# Patient Record
Sex: FEMALE | Race: BLACK OR AFRICAN AMERICAN | NOT HISPANIC OR LATINO | Employment: OTHER | URBAN - METROPOLITAN AREA
[De-identification: names, ages, dates, MRNs, and addresses within clinical notes are randomized per-mention and may not be internally consistent; named-entity substitution may affect disease eponyms.]

---

## 2020-07-14 ENCOUNTER — APPOINTMENT (OUTPATIENT)
Dept: GENERAL RADIOLOGY | Facility: HOSPITAL | Age: 55
End: 2020-07-14

## 2020-07-14 ENCOUNTER — HOSPITAL ENCOUNTER (EMERGENCY)
Facility: HOSPITAL | Age: 55
Discharge: HOME | End: 2020-07-14
Attending: EMERGENCY MEDICINE | Admitting: EMERGENCY MEDICINE

## 2020-07-14 VITALS
OXYGEN SATURATION: 93 % | HEIGHT: 65 IN | SYSTOLIC BLOOD PRESSURE: 198 MMHG | TEMPERATURE: 97.9 F | HEART RATE: 70 BPM | RESPIRATION RATE: 18 BRPM | BODY MASS INDEX: 38.32 KG/M2 | DIASTOLIC BLOOD PRESSURE: 86 MMHG | WEIGHT: 230 LBS

## 2020-07-14 DIAGNOSIS — I48.91 ATRIAL FIBRILLATION WITH RVR (HCC): Primary | ICD-10-CM

## 2020-07-14 DIAGNOSIS — R06.02 SHORTNESS OF BREATH: ICD-10-CM

## 2020-07-14 LAB
ALBUMIN SERPL-MCNC: 3.6 G/DL (ref 3.5–5.2)
ALBUMIN/GLOB SERPL: 1.1 G/DL
ALP SERPL-CCNC: 89 U/L (ref 39–117)
ALT SERPL W P-5'-P-CCNC: 12 U/L (ref 1–33)
ANION GAP SERPL CALCULATED.3IONS-SCNC: 10 MMOL/L (ref 5–15)
AST SERPL-CCNC: 20 U/L (ref 1–32)
BASOPHILS # BLD AUTO: 0.06 10*3/MM3 (ref 0–0.2)
BASOPHILS NFR BLD AUTO: 0.6 % (ref 0–1.5)
BILIRUB SERPL-MCNC: 0.2 MG/DL (ref 0–1.2)
BUN SERPL-MCNC: 41 MG/DL (ref 6–20)
BUN/CREAT SERPL: 16.1 (ref 7–25)
CALCIUM SPEC-SCNC: 9.2 MG/DL (ref 8.6–10.5)
CHLORIDE SERPL-SCNC: 103 MMOL/L (ref 98–107)
CO2 SERPL-SCNC: 24 MMOL/L (ref 22–29)
CREAT SERPL-MCNC: 2.55 MG/DL (ref 0.57–1)
DEPRECATED RDW RBC AUTO: 51.9 FL (ref 37–54)
EOSINOPHIL # BLD AUTO: 0.24 10*3/MM3 (ref 0–0.4)
EOSINOPHIL NFR BLD AUTO: 2.4 % (ref 0.3–6.2)
ERYTHROCYTE [DISTWIDTH] IN BLOOD BY AUTOMATED COUNT: 14.6 % (ref 12.3–15.4)
GFR SERPL CREATININE-BSD FRML MDRD: 24 ML/MIN/1.73
GLOBULIN UR ELPH-MCNC: 3.2 GM/DL
GLUCOSE SERPL-MCNC: 313 MG/DL (ref 65–99)
HCT VFR BLD AUTO: 37.1 % (ref 34–46.6)
HGB BLD-MCNC: 11.3 G/DL (ref 12–15.9)
HOLD SPECIMEN: NORMAL
HOLD SPECIMEN: NORMAL
IMM GRANULOCYTES # BLD AUTO: 0.04 10*3/MM3 (ref 0–0.05)
IMM GRANULOCYTES NFR BLD AUTO: 0.4 % (ref 0–0.5)
LIPASE SERPL-CCNC: 41 U/L (ref 13–60)
LYMPHOCYTES # BLD AUTO: 1.78 10*3/MM3 (ref 0.7–3.1)
LYMPHOCYTES NFR BLD AUTO: 18 % (ref 19.6–45.3)
MCH RBC QN AUTO: 29.6 PG (ref 26.6–33)
MCHC RBC AUTO-ENTMCNC: 30.5 G/DL (ref 31.5–35.7)
MCV RBC AUTO: 97.1 FL (ref 79–97)
MONOCYTES # BLD AUTO: 1 10*3/MM3 (ref 0.1–0.9)
MONOCYTES NFR BLD AUTO: 10.1 % (ref 5–12)
NEUTROPHILS NFR BLD AUTO: 6.78 10*3/MM3 (ref 1.7–7)
NEUTROPHILS NFR BLD AUTO: 68.5 % (ref 42.7–76)
NRBC BLD AUTO-RTO: 0 /100 WBC (ref 0–0.2)
NT-PROBNP SERPL-MCNC: 1919 PG/ML (ref 0–900)
PLATELET # BLD AUTO: 215 10*3/MM3 (ref 140–450)
PMV BLD AUTO: 10.6 FL (ref 6–12)
POTASSIUM SERPL-SCNC: 4.4 MMOL/L (ref 3.5–5.2)
PROT SERPL-MCNC: 6.8 G/DL (ref 6–8.5)
RBC # BLD AUTO: 3.82 10*6/MM3 (ref 3.77–5.28)
SODIUM SERPL-SCNC: 137 MMOL/L (ref 136–145)
TROPONIN T SERPL-MCNC: <0.01 NG/ML (ref 0–0.03)
TROPONIN T SERPL-MCNC: <0.01 NG/ML (ref 0–0.03)
WBC # BLD AUTO: 9.9 10*3/MM3 (ref 3.4–10.8)
WHOLE BLOOD HOLD SPECIMEN: NORMAL
WHOLE BLOOD HOLD SPECIMEN: NORMAL

## 2020-07-14 PROCEDURE — 93005 ELECTROCARDIOGRAM TRACING: CPT | Performed by: EMERGENCY MEDICINE

## 2020-07-14 PROCEDURE — 71045 X-RAY EXAM CHEST 1 VIEW: CPT

## 2020-07-14 PROCEDURE — 94640 AIRWAY INHALATION TREATMENT: CPT

## 2020-07-14 PROCEDURE — 96366 THER/PROPH/DIAG IV INF ADDON: CPT

## 2020-07-14 PROCEDURE — 83880 ASSAY OF NATRIURETIC PEPTIDE: CPT | Performed by: EMERGENCY MEDICINE

## 2020-07-14 PROCEDURE — 84484 ASSAY OF TROPONIN QUANT: CPT | Performed by: EMERGENCY MEDICINE

## 2020-07-14 PROCEDURE — 96376 TX/PRO/DX INJ SAME DRUG ADON: CPT

## 2020-07-14 PROCEDURE — 84484 ASSAY OF TROPONIN QUANT: CPT | Performed by: NURSE PRACTITIONER

## 2020-07-14 PROCEDURE — 80053 COMPREHEN METABOLIC PANEL: CPT | Performed by: EMERGENCY MEDICINE

## 2020-07-14 PROCEDURE — 83690 ASSAY OF LIPASE: CPT | Performed by: EMERGENCY MEDICINE

## 2020-07-14 PROCEDURE — 96365 THER/PROPH/DIAG IV INF INIT: CPT

## 2020-07-14 PROCEDURE — 85025 COMPLETE CBC W/AUTO DIFF WBC: CPT | Performed by: EMERGENCY MEDICINE

## 2020-07-14 PROCEDURE — 99285 EMERGENCY DEPT VISIT HI MDM: CPT

## 2020-07-14 RX ORDER — DILTIAZEM HCL IN NACL,ISO-OSM 125 MG/125
5-15 PLASTIC BAG, INJECTION (ML) INTRAVENOUS
Status: DISCONTINUED | OUTPATIENT
Start: 2020-07-14 | End: 2020-07-14 | Stop reason: HOSPADM

## 2020-07-14 RX ORDER — ALPRAZOLAM 1 MG/1
1 TABLET ORAL 3 TIMES DAILY PRN
COMMUNITY

## 2020-07-14 RX ORDER — HYDROCODONE BITARTRATE AND ACETAMINOPHEN 7.5; 325 MG/1; MG/1
1 TABLET ORAL EVERY 8 HOURS PRN
COMMUNITY

## 2020-07-14 RX ORDER — PRAVASTATIN SODIUM 40 MG
80 TABLET ORAL DAILY
COMMUNITY

## 2020-07-14 RX ORDER — FAMOTIDINE 20 MG/1
20 TABLET, FILM COATED ORAL 2 TIMES DAILY
COMMUNITY

## 2020-07-14 RX ORDER — MELATONIN
1000 DAILY
COMMUNITY

## 2020-07-14 RX ORDER — DILTIAZEM HYDROCHLORIDE 5 MG/ML
10 INJECTION INTRAVENOUS ONCE
Status: COMPLETED | OUTPATIENT
Start: 2020-07-14 | End: 2020-07-14

## 2020-07-14 RX ORDER — ASPIRIN 81 MG/1
324 TABLET, CHEWABLE ORAL ONCE
Status: DISCONTINUED | OUTPATIENT
Start: 2020-07-14 | End: 2020-07-14

## 2020-07-14 RX ORDER — IPRATROPIUM BROMIDE AND ALBUTEROL SULFATE 2.5; .5 MG/3ML; MG/3ML
3 SOLUTION RESPIRATORY (INHALATION) ONCE
Status: COMPLETED | OUTPATIENT
Start: 2020-07-14 | End: 2020-07-14

## 2020-07-14 RX ORDER — HYDROCHLOROTHIAZIDE 25 MG/1
25 TABLET ORAL DAILY
COMMUNITY
End: 2020-09-04 | Stop reason: HOSPADM

## 2020-07-14 RX ORDER — ASPIRIN 81 MG/1
81 TABLET, CHEWABLE ORAL DAILY
COMMUNITY

## 2020-07-14 RX ORDER — SODIUM CHLORIDE 0.9 % (FLUSH) 0.9 %
10 SYRINGE (ML) INJECTION AS NEEDED
Status: DISCONTINUED | OUTPATIENT
Start: 2020-07-14 | End: 2020-07-14 | Stop reason: HOSPADM

## 2020-07-14 RX ORDER — METOPROLOL TARTRATE 50 MG/1
50 TABLET, FILM COATED ORAL 2 TIMES DAILY
COMMUNITY
End: 2020-09-04 | Stop reason: HOSPADM

## 2020-07-14 RX ADMIN — IPRATROPIUM BROMIDE AND ALBUTEROL SULFATE 3 ML: 2.5; .5 SOLUTION RESPIRATORY (INHALATION) at 15:32

## 2020-07-14 RX ADMIN — Medication 5 MG/HR: at 10:59

## 2020-07-14 RX ADMIN — DILTIAZEM HYDROCHLORIDE 10 MG: 5 INJECTION INTRAVENOUS at 10:57

## 2020-07-14 NOTE — ED PROVIDER NOTES
"Subjective   Patient is a 55-year-old  female who presents to the ER today via EMS with complaints of rapid palpitations, chest pressure and shortness of breath that began approximately 2 hours prior to arrival.  The chest pressure improved prior to arrival and now is just \"annoying\".   Patient states that the shortness of breath is worse with exertion.  Has history of atrial fib and is anticoagulated on Eliquis, and takes metoprolol twice daily.  Reports mild lightheadedness and dizziness, but denies syncope.  Denies missing any doses of her medications.      History provided by:  Patient  Palpitations   Palpitations quality:  Fast  Onset quality:  Sudden  Duration:  2 hours  Timing:  Constant  Progression:  Unchanged  Chronicity:  New  Context comment:  History of atrial fib.  Anticoagulated on Eliquis, on metoprolol for rate control  Relieved by:  None tried  Worsened by:  Nothing  Ineffective treatments:  None tried  Associated symptoms: chest pressure, dizziness and shortness of breath    Associated symptoms: no back pain, no chest pain, no cough, no lower extremity edema, no malaise/fatigue, no nausea and no vomiting    Risk factors: hx of atrial fibrillation        Review of Systems   Constitutional: Negative for malaise/fatigue.   Respiratory: Positive for shortness of breath. Negative for cough and chest tightness.    Cardiovascular: Positive for palpitations. Negative for chest pain.   Gastrointestinal: Negative for nausea and vomiting.   Musculoskeletal: Negative for back pain.   Neurological: Positive for dizziness and light-headedness. Negative for syncope.   All other systems reviewed and are negative.      Past Medical History:   Diagnosis Date   • Anxiety    • CHF (congestive heart failure) (CMS/HCC)    • COPD (chronic obstructive pulmonary disease) (CMS/HCC)    • Diabetes mellitus (CMS/HCC)    • Hypertension    • Myocardial infarction (CMS/HCC)     3 stents   • Renal disorder  "       Allergies   Allergen Reactions   • Codeine Shortness Of Breath   • Sulfa Antibiotics Shortness Of Breath   • Nitrofuran Derivatives Swelling       History reviewed. No pertinent surgical history.    History reviewed. No pertinent family history.    Social History     Socioeconomic History   • Marital status:      Spouse name: Not on file   • Number of children: Not on file   • Years of education: Not on file   • Highest education level: Not on file   Tobacco Use   • Smoking status: Former Smoker     Packs/day: 1.00     Types: Cigarettes   Substance and Sexual Activity   • Alcohol use: Not Currently   • Drug use: Not Currently   • Sexual activity: Defer           Objective   Physical Exam   Constitutional: She is oriented to person, place, and time. She appears well-developed and well-nourished.   HENT:   Head: Normocephalic.   Neck: Neck supple. No JVD present.   Cardiovascular: Intact distal pulses and normal pulses. An irregularly irregular rhythm present. Tachycardia present.   Pulses:       Dorsalis pedis pulses are 2+ on the right side, and 2+ on the left side.   Pulmonary/Chest: Effort normal and breath sounds normal. She has no wheezes. She has no rhonchi. She has no rales.   Abdominal: Soft. Bowel sounds are normal.   Musculoskeletal:        Right lower leg: Normal. She exhibits no edema.        Left lower leg: Normal. She exhibits no edema.   Neurological: She is alert and oriented to person, place, and time.   Skin: Skin is warm and dry.   Psychiatric: She has a normal mood and affect. Her behavior is normal.   Vitals reviewed.      Procedures           ED Course      Recent Results (from the past 24 hour(s))   Troponin    Collection Time: 07/14/20 10:32 AM   Result Value Ref Range    Troponin T <0.010 0.000 - 0.030 ng/mL   Comprehensive Metabolic Panel    Collection Time: 07/14/20 10:32 AM   Result Value Ref Range    Glucose 313 (H) 65 - 99 mg/dL    BUN 41 (H) 6 - 20 mg/dL    Creatinine 2.55  (H) 0.57 - 1.00 mg/dL    Sodium 137 136 - 145 mmol/L    Potassium 4.4 3.5 - 5.2 mmol/L    Chloride 103 98 - 107 mmol/L    CO2 24.0 22.0 - 29.0 mmol/L    Calcium 9.2 8.6 - 10.5 mg/dL    Total Protein 6.8 6.0 - 8.5 g/dL    Albumin 3.60 3.50 - 5.20 g/dL    ALT (SGPT) 12 1 - 33 U/L    AST (SGOT) 20 1 - 32 U/L    Alkaline Phosphatase 89 39 - 117 U/L    Total Bilirubin 0.2 0.0 - 1.2 mg/dL    eGFR  African Amer 24 (L) >60 mL/min/1.73    Globulin 3.2 gm/dL    A/G Ratio 1.1 g/dL    BUN/Creatinine Ratio 16.1 7.0 - 25.0    Anion Gap 10.0 5.0 - 15.0 mmol/L   Lipase    Collection Time: 07/14/20 10:32 AM   Result Value Ref Range    Lipase 41 13 - 60 U/L   BNP    Collection Time: 07/14/20 10:32 AM   Result Value Ref Range    proBNP 1,919.0 (H) 0.0 - 900.0 pg/mL   Light Blue Top    Collection Time: 07/14/20 10:32 AM   Result Value Ref Range    Extra Tube hold for add-on    Green Top (Gel)    Collection Time: 07/14/20 10:32 AM   Result Value Ref Range    Extra Tube Hold for add-ons.    Lavender Top    Collection Time: 07/14/20 10:32 AM   Result Value Ref Range    Extra Tube hold for add-on    Gold Top - SST    Collection Time: 07/14/20 10:32 AM   Result Value Ref Range    Extra Tube Hold for add-ons.    CBC Auto Differential    Collection Time: 07/14/20 10:32 AM   Result Value Ref Range    WBC 9.90 3.40 - 10.80 10*3/mm3    RBC 3.82 3.77 - 5.28 10*6/mm3    Hemoglobin 11.3 (L) 12.0 - 15.9 g/dL    Hematocrit 37.1 34.0 - 46.6 %    MCV 97.1 (H) 79.0 - 97.0 fL    MCH 29.6 26.6 - 33.0 pg    MCHC 30.5 (L) 31.5 - 35.7 g/dL    RDW 14.6 12.3 - 15.4 %    RDW-SD 51.9 37.0 - 54.0 fl    MPV 10.6 6.0 - 12.0 fL    Platelets 215 140 - 450 10*3/mm3    Neutrophil % 68.5 42.7 - 76.0 %    Lymphocyte % 18.0 (L) 19.6 - 45.3 %    Monocyte % 10.1 5.0 - 12.0 %    Eosinophil % 2.4 0.3 - 6.2 %    Basophil % 0.6 0.0 - 1.5 %    Immature Grans % 0.4 0.0 - 0.5 %    Neutrophils, Absolute 6.78 1.70 - 7.00 10*3/mm3    Lymphocytes, Absolute 1.78 0.70 - 3.10 10*3/mm3     "Monocytes, Absolute 1.00 (H) 0.10 - 0.90 10*3/mm3    Eosinophils, Absolute 0.24 0.00 - 0.40 10*3/mm3    Basophils, Absolute 0.06 0.00 - 0.20 10*3/mm3    Immature Grans, Absolute 0.04 0.00 - 0.05 10*3/mm3    nRBC 0.0 0.0 - 0.2 /100 WBC     Note: In addition to lab results from this visit, the labs listed above may include labs taken at another facility or during a different encounter within the last 24 hours. Please correlate lab times with ED admission and discharge times for further clarification of the services performed during this visit.    XR Chest 1 View   Preliminary Result   Heart is enlarged with prominence of the pulmonary   vascularity. No evidence of acute parenchymal disease.       D:  07/14/2020   E:  07/14/2020            Vitals:    07/14/20 1110 07/14/20 1140 07/14/20 1141 07/14/20 1150   BP: 147/74 (!) 175/114  157/84   BP Location:       Patient Position:       Pulse: 83  70 67   Resp:       Temp:       TempSrc:       SpO2: 94%  95% 91%   Weight:       Height:         Medications   sodium chloride 0.9 % flush 10 mL (has no administration in time range)   dilTIAZem (CARDIZEM) 125 mg in 125 mL 0.7% sodium chloride (1 mg/mL) infusion (5 mg/hr Intravenous New Bag 7/14/20 1059)   dilTIAZem (CARDIZEM) injection 10 mg (10 mg Intravenous Given 7/14/20 1057)     ECG/EMG Results (last 24 hours)     Procedure Component Value Units Date/Time    ECG 12 Lead [[id number]] Collected:  07/14/20 1002     Updated:  07/14/20 1012        ECG 12 Lead           HEART Score: 3       After initiation of the Cardizem, patient converted to normal sinus rhythm with a rate in the 80s.  States that she is feeling much better, and breathing is back to normal.  States that her chest \"pressure\" is completely resolved and is wishing to go home.     Spoke with Dr. Atkins who recommends that if we have a second negative troponin is okay to discharge patient to home with follow-up in his office in 3 days.     Prior to discharge pt is " requesting a duoneb treatment as she is several hours late on her regular treatment.      Discussed treatment plan with patient to include follow-up with Dr. Atkins in 3 days, avoiding caffeine, or stressful or strenuous activity.  Advised patient to return to the ER with return of symptoms or new symptoms.  Patient verbalized understanding of all discussed                           MDM    Final diagnoses:   Atrial fibrillation with RVR (CMS/HCC)            Samanta Guzman, LUCI  07/15/20 0702

## 2020-08-25 ENCOUNTER — APPOINTMENT (OUTPATIENT)
Dept: CT IMAGING | Facility: HOSPITAL | Age: 55
End: 2020-08-25

## 2020-08-25 ENCOUNTER — HOSPITAL ENCOUNTER (INPATIENT)
Facility: HOSPITAL | Age: 55
LOS: 10 days | Discharge: HOME | End: 2020-09-04
Attending: EMERGENCY MEDICINE | Admitting: HOSPITALIST

## 2020-08-25 DIAGNOSIS — J96.01 ACUTE RESPIRATORY FAILURE WITH HYPOXIA (HCC): ICD-10-CM

## 2020-08-25 DIAGNOSIS — I48.91 ATRIAL FIBRILLATION WITH RVR (HCC): Primary | ICD-10-CM

## 2020-08-25 DIAGNOSIS — R73.9 HYPERGLYCEMIA: ICD-10-CM

## 2020-08-25 DIAGNOSIS — N18.9 CHRONIC KIDNEY DISEASE, UNSPECIFIED CKD STAGE: ICD-10-CM

## 2020-08-25 DIAGNOSIS — J18.9 COMMUNITY ACQUIRED PNEUMONIA, UNSPECIFIED LATERALITY: ICD-10-CM

## 2020-08-25 LAB
ALBUMIN SERPL-MCNC: 3.9 G/DL (ref 3.5–5.2)
ALBUMIN/GLOB SERPL: 1.1 G/DL
ALP SERPL-CCNC: 92 U/L (ref 39–117)
ALT SERPL W P-5'-P-CCNC: 15 U/L (ref 1–33)
ANION GAP SERPL CALCULATED.3IONS-SCNC: 11 MMOL/L (ref 5–15)
AST SERPL-CCNC: 23 U/L (ref 1–32)
BASOPHILS # BLD AUTO: 0.06 10*3/MM3 (ref 0–0.2)
BASOPHILS NFR BLD AUTO: 0.6 % (ref 0–1.5)
BILIRUB SERPL-MCNC: 0.4 MG/DL (ref 0–1.2)
BUN SERPL-MCNC: 45 MG/DL (ref 6–20)
BUN/CREAT SERPL: 15.2 (ref 7–25)
CALCIUM SPEC-SCNC: 9.8 MG/DL (ref 8.6–10.5)
CHLORIDE SERPL-SCNC: 106 MMOL/L (ref 98–107)
CO2 SERPL-SCNC: 21 MMOL/L (ref 22–29)
CREAT SERPL-MCNC: 2.96 MG/DL (ref 0.57–1)
D-LACTATE SERPL-SCNC: 1.5 MMOL/L (ref 0.5–2)
DEPRECATED RDW RBC AUTO: 55.9 FL (ref 37–54)
EOSINOPHIL # BLD AUTO: 0.24 10*3/MM3 (ref 0–0.4)
EOSINOPHIL NFR BLD AUTO: 2.3 % (ref 0.3–6.2)
ERYTHROCYTE [DISTWIDTH] IN BLOOD BY AUTOMATED COUNT: 15.7 % (ref 12.3–15.4)
GFR SERPL CREATININE-BSD FRML MDRD: 20 ML/MIN/1.73
GLOBULIN UR ELPH-MCNC: 3.7 GM/DL
GLUCOSE SERPL-MCNC: 266 MG/DL (ref 65–99)
HCT VFR BLD AUTO: 36.7 % (ref 34–46.6)
HGB BLD-MCNC: 11.1 G/DL (ref 12–15.9)
IMM GRANULOCYTES # BLD AUTO: 0.04 10*3/MM3 (ref 0–0.05)
IMM GRANULOCYTES NFR BLD AUTO: 0.4 % (ref 0–0.5)
INR PPP: 1.2 (ref 0.85–1.16)
LYMPHOCYTES # BLD AUTO: 2.26 10*3/MM3 (ref 0.7–3.1)
LYMPHOCYTES NFR BLD AUTO: 22 % (ref 19.6–45.3)
MAGNESIUM SERPL-MCNC: 2.1 MG/DL (ref 1.6–2.6)
MCH RBC QN AUTO: 29.8 PG (ref 26.6–33)
MCHC RBC AUTO-ENTMCNC: 30.2 G/DL (ref 31.5–35.7)
MCV RBC AUTO: 98.7 FL (ref 79–97)
MONOCYTES # BLD AUTO: 0.98 10*3/MM3 (ref 0.1–0.9)
MONOCYTES NFR BLD AUTO: 9.6 % (ref 5–12)
NEUTROPHILS NFR BLD AUTO: 6.67 10*3/MM3 (ref 1.7–7)
NEUTROPHILS NFR BLD AUTO: 65.1 % (ref 42.7–76)
NRBC BLD AUTO-RTO: 0 /100 WBC (ref 0–0.2)
NT-PROBNP SERPL-MCNC: 2842 PG/ML (ref 0–900)
PLATELET # BLD AUTO: 199 10*3/MM3 (ref 140–450)
PMV BLD AUTO: 11.1 FL (ref 6–12)
POTASSIUM SERPL-SCNC: 4.5 MMOL/L (ref 3.5–5.2)
PROCALCITONIN SERPL-MCNC: 0.14 NG/ML (ref 0–0.25)
PROT SERPL-MCNC: 7.6 G/DL (ref 6–8.5)
PROTHROMBIN TIME: 14.9 SECONDS (ref 11.5–14)
RBC # BLD AUTO: 3.72 10*6/MM3 (ref 3.77–5.28)
SODIUM SERPL-SCNC: 138 MMOL/L (ref 136–145)
T4 FREE SERPL-MCNC: 1.16 NG/DL (ref 0.93–1.7)
TROPONIN T SERPL-MCNC: <0.01 NG/ML (ref 0–0.03)
TSH SERPL DL<=0.05 MIU/L-ACNC: 2.34 UIU/ML (ref 0.27–4.2)
WBC # BLD AUTO: 10.25 10*3/MM3 (ref 3.4–10.8)

## 2020-08-25 PROCEDURE — 83605 ASSAY OF LACTIC ACID: CPT | Performed by: EMERGENCY MEDICINE

## 2020-08-25 PROCEDURE — 84439 ASSAY OF FREE THYROXINE: CPT | Performed by: EMERGENCY MEDICINE

## 2020-08-25 PROCEDURE — 83735 ASSAY OF MAGNESIUM: CPT | Performed by: EMERGENCY MEDICINE

## 2020-08-25 PROCEDURE — 80053 COMPREHEN METABOLIC PANEL: CPT | Performed by: EMERGENCY MEDICINE

## 2020-08-25 PROCEDURE — 94640 AIRWAY INHALATION TREATMENT: CPT

## 2020-08-25 PROCEDURE — 84484 ASSAY OF TROPONIN QUANT: CPT | Performed by: EMERGENCY MEDICINE

## 2020-08-25 PROCEDURE — 85025 COMPLETE CBC W/AUTO DIFF WBC: CPT | Performed by: EMERGENCY MEDICINE

## 2020-08-25 PROCEDURE — 93005 ELECTROCARDIOGRAM TRACING: CPT | Performed by: EMERGENCY MEDICINE

## 2020-08-25 PROCEDURE — 84443 ASSAY THYROID STIM HORMONE: CPT | Performed by: EMERGENCY MEDICINE

## 2020-08-25 PROCEDURE — 25010000002 METHYLPREDNISOLONE PER 125 MG: Performed by: EMERGENCY MEDICINE

## 2020-08-25 PROCEDURE — 87040 BLOOD CULTURE FOR BACTERIA: CPT | Performed by: EMERGENCY MEDICINE

## 2020-08-25 PROCEDURE — 25010000002 MAGNESIUM SULFATE 2 GM/50ML SOLUTION: Performed by: EMERGENCY MEDICINE

## 2020-08-25 PROCEDURE — 99284 EMERGENCY DEPT VISIT MOD MDM: CPT

## 2020-08-25 PROCEDURE — 83880 ASSAY OF NATRIURETIC PEPTIDE: CPT | Performed by: EMERGENCY MEDICINE

## 2020-08-25 PROCEDURE — 71250 CT THORAX DX C-: CPT

## 2020-08-25 PROCEDURE — 85610 PROTHROMBIN TIME: CPT | Performed by: EMERGENCY MEDICINE

## 2020-08-25 PROCEDURE — 84145 PROCALCITONIN (PCT): CPT | Performed by: EMERGENCY MEDICINE

## 2020-08-25 RX ORDER — ALBUTEROL SULFATE 90 UG/1
2 AEROSOL, METERED RESPIRATORY (INHALATION) ONCE
Status: COMPLETED | OUTPATIENT
Start: 2020-08-25 | End: 2020-08-25

## 2020-08-25 RX ORDER — MAGNESIUM SULFATE HEPTAHYDRATE 40 MG/ML
2 INJECTION, SOLUTION INTRAVENOUS ONCE
Status: COMPLETED | OUTPATIENT
Start: 2020-08-25 | End: 2020-08-26

## 2020-08-25 RX ORDER — METHYLPREDNISOLONE SODIUM SUCCINATE 125 MG/2ML
125 INJECTION, POWDER, LYOPHILIZED, FOR SOLUTION INTRAMUSCULAR; INTRAVENOUS ONCE
Status: COMPLETED | OUTPATIENT
Start: 2020-08-25 | End: 2020-08-25

## 2020-08-25 RX ORDER — DILTIAZEM HYDROCHLORIDE 5 MG/ML
10 INJECTION INTRAVENOUS ONCE
Status: COMPLETED | OUTPATIENT
Start: 2020-08-25 | End: 2020-08-26

## 2020-08-25 RX ORDER — ALBUTEROL SULFATE 90 UG/1
2 AEROSOL, METERED RESPIRATORY (INHALATION)
Status: DISCONTINUED | OUTPATIENT
Start: 2020-08-25 | End: 2020-08-26

## 2020-08-25 RX ADMIN — ALBUTEROL SULFATE 2 PUFF: 90 AEROSOL, METERED RESPIRATORY (INHALATION) at 22:10

## 2020-08-25 RX ADMIN — MAGNESIUM SULFATE 2 G: 2 INJECTION INTRAVENOUS at 23:03

## 2020-08-25 RX ADMIN — METHYLPREDNISOLONE SODIUM SUCCINATE 125 MG: 125 INJECTION, POWDER, FOR SOLUTION INTRAMUSCULAR; INTRAVENOUS at 22:34

## 2020-08-26 ENCOUNTER — APPOINTMENT (OUTPATIENT)
Dept: CARDIOLOGY | Facility: HOSPITAL | Age: 55
End: 2020-08-26

## 2020-08-26 PROBLEM — N18.4 CKD (CHRONIC KIDNEY DISEASE) STAGE 4, GFR 15-29 ML/MIN (HCC): Status: ACTIVE | Noted: 2020-08-26

## 2020-08-26 PROBLEM — I50.9 CHF (CONGESTIVE HEART FAILURE) (HCC): Status: ACTIVE | Noted: 2020-08-26

## 2020-08-26 PROBLEM — J44.9 COPD (CHRONIC OBSTRUCTIVE PULMONARY DISEASE) (HCC): Status: ACTIVE | Noted: 2020-08-26

## 2020-08-26 PROBLEM — I10 HTN (HYPERTENSION): Status: ACTIVE | Noted: 2020-08-26

## 2020-08-26 PROBLEM — J96.01 ACUTE RESPIRATORY FAILURE WITH HYPOXIA (HCC): Status: ACTIVE | Noted: 2020-08-26

## 2020-08-26 PROBLEM — I25.10 CAD (CORONARY ARTERY DISEASE): Status: ACTIVE | Noted: 2020-08-26

## 2020-08-26 PROBLEM — I70.1 RENAL ARTERY STENOSIS (HCC): Status: ACTIVE | Noted: 2020-08-26

## 2020-08-26 PROBLEM — J18.9 PNEUMONIA: Status: ACTIVE | Noted: 2020-08-26

## 2020-08-26 PROBLEM — F41.9 ANXIETY: Status: ACTIVE | Noted: 2020-08-26

## 2020-08-26 PROBLEM — D53.9 MACROCYTIC ANEMIA: Status: ACTIVE | Noted: 2020-08-26

## 2020-08-26 PROBLEM — N17.9 AKI (ACUTE KIDNEY INJURY) (HCC): Status: ACTIVE | Noted: 2020-08-26

## 2020-08-26 PROBLEM — E11.9 TYPE 2 DIABETES MELLITUS (HCC): Status: ACTIVE | Noted: 2020-08-26

## 2020-08-26 PROBLEM — Z72.0 TOBACCO ABUSE: Status: ACTIVE | Noted: 2020-08-26

## 2020-08-26 LAB
25(OH)D3 SERPL-MCNC: 37.5 NG/ML (ref 30–100)
ANION GAP SERPL CALCULATED.3IONS-SCNC: 12 MMOL/L (ref 5–15)
B PARAPERT DNA SPEC QL NAA+PROBE: NOT DETECTED
B PERT DNA SPEC QL NAA+PROBE: NOT DETECTED
BACTERIA SPEC RESP CULT: NORMAL
BACTERIA UR QL AUTO: NORMAL /HPF
BASOPHILS # BLD AUTO: 0.03 10*3/MM3 (ref 0–0.2)
BASOPHILS NFR BLD AUTO: 0.3 % (ref 0–1.5)
BH CV ECHO MEAS - AO MAX PG (FULL): 6 MMHG
BH CV ECHO MEAS - AO MAX PG: 9.9 MMHG
BH CV ECHO MEAS - AO MEAN PG (FULL): 3.7 MMHG
BH CV ECHO MEAS - AO MEAN PG: 5.5 MMHG
BH CV ECHO MEAS - AO ROOT AREA (BSA CORRECTED): 1.2
BH CV ECHO MEAS - AO ROOT AREA: 5.9 CM^2
BH CV ECHO MEAS - AO ROOT DIAM: 2.7 CM
BH CV ECHO MEAS - AO V2 MAX: 157.5 CM/SEC
BH CV ECHO MEAS - AO V2 MEAN: 111.9 CM/SEC
BH CV ECHO MEAS - AO V2 VTI: 39.4 CM
BH CV ECHO MEAS - ASC AORTA: 3 CM
BH CV ECHO MEAS - AVA(I,A): 2 CM^2
BH CV ECHO MEAS - AVA(I,D): 2 CM^2
BH CV ECHO MEAS - AVA(V,A): 2.1 CM^2
BH CV ECHO MEAS - AVA(V,D): 2.1 CM^2
BH CV ECHO MEAS - BSA(HAYCOCK): 2.4 M^2
BH CV ECHO MEAS - BSA: 2.2 M^2
BH CV ECHO MEAS - BZI_BMI: 43.8 KILOGRAMS/M^2
BH CV ECHO MEAS - BZI_METRIC_HEIGHT: 165.1 CM
BH CV ECHO MEAS - BZI_METRIC_WEIGHT: 119.3 KG
BH CV ECHO MEAS - EDV(CUBED): 156.1 ML
BH CV ECHO MEAS - EDV(MOD-SP2): 183 ML
BH CV ECHO MEAS - EDV(MOD-SP4): 202 ML
BH CV ECHO MEAS - EDV(TEICH): 140.4 ML
BH CV ECHO MEAS - EF(CUBED): 82.8 %
BH CV ECHO MEAS - EF(MOD-BP): 56 %
BH CV ECHO MEAS - EF(MOD-SP2): 55.7 %
BH CV ECHO MEAS - EF(MOD-SP4): 59.9 %
BH CV ECHO MEAS - EF(TEICH): 75.2 %
BH CV ECHO MEAS - ESV(CUBED): 26.9 ML
BH CV ECHO MEAS - ESV(MOD-SP2): 81 ML
BH CV ECHO MEAS - ESV(MOD-SP4): 81 ML
BH CV ECHO MEAS - ESV(TEICH): 34.8 ML
BH CV ECHO MEAS - FS: 44.4 %
BH CV ECHO MEAS - IVS/LVPW: 0.97
BH CV ECHO MEAS - IVSD: 1.3 CM
BH CV ECHO MEAS - LA DIMENSION: 5.4 CM
BH CV ECHO MEAS - LA/AO: 2
BH CV ECHO MEAS - LAD MAJOR: 6 CM
BH CV ECHO MEAS - LAT PEAK E' VEL: 7.2 CM/SEC
BH CV ECHO MEAS - LATERAL E/E' RATIO: 22
BH CV ECHO MEAS - LV DIASTOLIC VOL/BSA (35-75): 90.9 ML/M^2
BH CV ECHO MEAS - LV IVRT: 0.06 SEC
BH CV ECHO MEAS - LV MASS(C)D: 304.6 GRAMS
BH CV ECHO MEAS - LV MASS(C)DI: 137.1 GRAMS/M^2
BH CV ECHO MEAS - LV MAX PG: 3.9 MMHG
BH CV ECHO MEAS - LV MEAN PG: 1.7 MMHG
BH CV ECHO MEAS - LV SYSTOLIC VOL/BSA (12-30): 36.4 ML/M^2
BH CV ECHO MEAS - LV V1 MAX: 98.7 CM/SEC
BH CV ECHO MEAS - LV V1 MEAN: 60.3 CM/SEC
BH CV ECHO MEAS - LV V1 VTI: 24.6 CM
BH CV ECHO MEAS - LVIDD: 5.4 CM
BH CV ECHO MEAS - LVIDS: 3 CM
BH CV ECHO MEAS - LVLD AP2: 8.8 CM
BH CV ECHO MEAS - LVLD AP4: 8.8 CM
BH CV ECHO MEAS - LVLS AP2: 7.9 CM
BH CV ECHO MEAS - LVLS AP4: 7.4 CM
BH CV ECHO MEAS - LVOT AREA (M): 3.1 CM^2
BH CV ECHO MEAS - LVOT AREA: 3.3 CM^2
BH CV ECHO MEAS - LVOT DIAM: 2 CM
BH CV ECHO MEAS - LVPWD: 1.4 CM
BH CV ECHO MEAS - MED PEAK E' VEL: 11 CM/SEC
BH CV ECHO MEAS - MEDIAL E/E' RATIO: 14.4
BH CV ECHO MEAS - MV A MAX VEL: 94.6 CM/SEC
BH CV ECHO MEAS - MV DEC SLOPE: 927.4 CM/SEC^2
BH CV ECHO MEAS - MV E MAX VEL: 160.4 CM/SEC
BH CV ECHO MEAS - MV E/A: 1.7
BH CV ECHO MEAS - MV MAX PG: 13.2 MMHG
BH CV ECHO MEAS - MV MEAN PG: 4.8 MMHG
BH CV ECHO MEAS - MV P1/2T MAX VEL: 181.3 CM/SEC
BH CV ECHO MEAS - MV P1/2T: 57.3 MSEC
BH CV ECHO MEAS - MV V2 MAX: 181.3 CM/SEC
BH CV ECHO MEAS - MV V2 MEAN: 97.3 CM/SEC
BH CV ECHO MEAS - MV V2 VTI: 42.8 CM
BH CV ECHO MEAS - MVA P1/2T LCG: 1.2 CM^2
BH CV ECHO MEAS - MVA(P1/2T): 3.8 CM^2
BH CV ECHO MEAS - MVA(VTI): 1.9 CM^2
BH CV ECHO MEAS - PA ACC SLOPE: 1194 CM/SEC^2
BH CV ECHO MEAS - PA ACC TIME: 0.11 SEC
BH CV ECHO MEAS - PA MAX PG: 8.1 MMHG
BH CV ECHO MEAS - PA PR(ACCEL): 31.5 MMHG
BH CV ECHO MEAS - PA V2 MAX: 142.5 CM/SEC
BH CV ECHO MEAS - PI END-D VEL: 135.7 CM/SEC
BH CV ECHO MEAS - RAP SYSTOLE: 8 MMHG
BH CV ECHO MEAS - RVSP: 31 MMHG
BH CV ECHO MEAS - SI(AO): 104.6 ML/M^2
BH CV ECHO MEAS - SI(CUBED): 58.2 ML/M^2
BH CV ECHO MEAS - SI(LVOT): 36.3 ML/M^2
BH CV ECHO MEAS - SI(MOD-SP2): 45.9 ML/M^2
BH CV ECHO MEAS - SI(MOD-SP4): 54.4 ML/M^2
BH CV ECHO MEAS - SI(TEICH): 47.5 ML/M^2
BH CV ECHO MEAS - SV(AO): 232.5 ML
BH CV ECHO MEAS - SV(CUBED): 129.3 ML
BH CV ECHO MEAS - SV(LVOT): 80.7 ML
BH CV ECHO MEAS - SV(MOD-SP2): 102 ML
BH CV ECHO MEAS - SV(MOD-SP4): 121 ML
BH CV ECHO MEAS - SV(TEICH): 105.6 ML
BH CV ECHO MEAS - TAPSE (>1.6): 2.1 CM2
BH CV ECHO MEAS - TR MAX PG: 23 MMHG
BH CV ECHO MEAS - TR MAX VEL: 238 CM/SEC
BH CV ECHO MEASUREMENTS AVERAGE E/E' RATIO: 17.63
BH CV VAS BP LEFT ARM: NORMAL MMHG
BH CV XLRA - RV BASE: 5 CM
BH CV XLRA - RV LENGTH: 6.5 CM
BH CV XLRA - RV MID: 3.5 CM
BH CV XLRA - TDI S': 12.6 CM/SEC
BILIRUB UR QL STRIP: NEGATIVE
BUN SERPL-MCNC: 47 MG/DL (ref 6–20)
BUN/CREAT SERPL: 15.5 (ref 7–25)
C PNEUM DNA NPH QL NAA+NON-PROBE: NOT DETECTED
CALCIUM SPEC-SCNC: 9.2 MG/DL (ref 8.6–10.5)
CHLORIDE SERPL-SCNC: 105 MMOL/L (ref 98–107)
CHOLEST SERPL-MCNC: 177 MG/DL (ref 0–200)
CLARITY UR: ABNORMAL
CO2 SERPL-SCNC: 21 MMOL/L (ref 22–29)
COLOR UR: YELLOW
CREAT SERPL-MCNC: 3.04 MG/DL (ref 0.57–1)
DEPRECATED RDW RBC AUTO: 56.1 FL (ref 37–54)
EOSINOPHIL # BLD AUTO: 0.02 10*3/MM3 (ref 0–0.4)
EOSINOPHIL NFR BLD AUTO: 0.2 % (ref 0.3–6.2)
ERYTHROCYTE [DISTWIDTH] IN BLOOD BY AUTOMATED COUNT: 15.5 % (ref 12.3–15.4)
FERRITIN SERPL-MCNC: 143.4 NG/ML (ref 13–150)
FLUAV H1 2009 PAND RNA NPH QL NAA+PROBE: NOT DETECTED
FLUAV H1 HA GENE NPH QL NAA+PROBE: NOT DETECTED
FLUAV H3 RNA NPH QL NAA+PROBE: NOT DETECTED
FLUAV SUBTYP SPEC NAA+PROBE: NOT DETECTED
FLUBV RNA ISLT QL NAA+PROBE: NOT DETECTED
FOLATE SERPL-MCNC: 4.96 NG/ML (ref 4.78–24.2)
GFR SERPL CREATININE-BSD FRML MDRD: 19 ML/MIN/1.73
GLUCOSE BLDC GLUCOMTR-MCNC: 348 MG/DL (ref 70–130)
GLUCOSE BLDC GLUCOMTR-MCNC: 351 MG/DL (ref 70–130)
GLUCOSE SERPL-MCNC: 349 MG/DL (ref 65–99)
GLUCOSE UR STRIP-MCNC: ABNORMAL MG/DL
GRAM STN SPEC: NORMAL
HADV DNA SPEC NAA+PROBE: NOT DETECTED
HBA1C MFR BLD: 9.2 % (ref 4.8–5.6)
HCOV 229E RNA SPEC QL NAA+PROBE: NOT DETECTED
HCOV HKU1 RNA SPEC QL NAA+PROBE: NOT DETECTED
HCOV NL63 RNA SPEC QL NAA+PROBE: NOT DETECTED
HCOV OC43 RNA SPEC QL NAA+PROBE: NOT DETECTED
HCT VFR BLD AUTO: 35.8 % (ref 34–46.6)
HDLC SERPL-MCNC: 42 MG/DL (ref 40–60)
HGB BLD-MCNC: 10.9 G/DL (ref 12–15.9)
HGB UR QL STRIP.AUTO: NEGATIVE
HMPV RNA NPH QL NAA+NON-PROBE: NOT DETECTED
HPIV1 RNA SPEC QL NAA+PROBE: NOT DETECTED
HPIV2 RNA SPEC QL NAA+PROBE: NOT DETECTED
HPIV3 RNA NPH QL NAA+PROBE: NOT DETECTED
HPIV4 P GENE NPH QL NAA+PROBE: NOT DETECTED
HYALINE CASTS UR QL AUTO: NORMAL /LPF
IMM GRANULOCYTES # BLD AUTO: 0.04 10*3/MM3 (ref 0–0.05)
IMM GRANULOCYTES NFR BLD AUTO: 0.4 % (ref 0–0.5)
IRON 24H UR-MRATE: 37 MCG/DL (ref 37–145)
IRON SATN MFR SERPL: 13 % (ref 20–50)
KETONES UR QL STRIP: NEGATIVE
L PNEUMO1 AG UR QL IA: NEGATIVE
LDLC SERPL CALC-MCNC: 110 MG/DL (ref 0–100)
LDLC/HDLC SERPL: 2.62 {RATIO}
LEFT ATRIUM VOLUME INDEX: 35.5 ML/M^2
LEFT ATRIUM VOLUME: 79 ML
LEUKOCYTE ESTERASE UR QL STRIP.AUTO: NEGATIVE
LYMPHOCYTES # BLD AUTO: 0.77 10*3/MM3 (ref 0.7–3.1)
LYMPHOCYTES NFR BLD AUTO: 8.6 % (ref 19.6–45.3)
M PNEUMO IGG SER IA-ACNC: NOT DETECTED
MAGNESIUM SERPL-MCNC: 2.4 MG/DL (ref 1.6–2.6)
MCH RBC QN AUTO: 30.1 PG (ref 26.6–33)
MCHC RBC AUTO-ENTMCNC: 30.4 G/DL (ref 31.5–35.7)
MCV RBC AUTO: 98.9 FL (ref 79–97)
MONOCYTES # BLD AUTO: 0.15 10*3/MM3 (ref 0.1–0.9)
MONOCYTES NFR BLD AUTO: 1.7 % (ref 5–12)
NEUTROPHILS NFR BLD AUTO: 7.92 10*3/MM3 (ref 1.7–7)
NEUTROPHILS NFR BLD AUTO: 88.8 % (ref 42.7–76)
NITRITE UR QL STRIP: NEGATIVE
NRBC BLD AUTO-RTO: 0 /100 WBC (ref 0–0.2)
PH UR STRIP.AUTO: <=5 [PH] (ref 5–8)
PLATELET # BLD AUTO: 177 10*3/MM3 (ref 140–450)
PMV BLD AUTO: 11.6 FL (ref 6–12)
POTASSIUM SERPL-SCNC: 5.3 MMOL/L (ref 3.5–5.2)
PROT UR QL STRIP: ABNORMAL
PTH-INTACT SERPL-MCNC: 33.5 PG/ML (ref 15–65)
RBC # BLD AUTO: 3.62 10*6/MM3 (ref 3.77–5.28)
RBC # UR: NORMAL /HPF
REF LAB TEST METHOD: NORMAL
RETICS # AUTO: 0.07 10*6/MM3 (ref 0.02–0.13)
RETICS/RBC NFR AUTO: 2.02 % (ref 0.7–1.9)
RHINOVIRUS RNA SPEC NAA+PROBE: NOT DETECTED
RSV RNA NPH QL NAA+NON-PROBE: NOT DETECTED
S PNEUM AG SPEC QL LA: NEGATIVE
SARS-COV-2 RNA NPH QL NAA+NON-PROBE: NOT DETECTED
SODIUM SERPL-SCNC: 138 MMOL/L (ref 136–145)
SP GR UR STRIP: 1.02 (ref 1–1.03)
SQUAMOUS #/AREA URNS HPF: NORMAL /HPF
TIBC SERPL-MCNC: 285 MCG/DL (ref 298–536)
TRANSFERRIN SERPL-MCNC: 191 MG/DL (ref 200–360)
TRIGL SERPL-MCNC: 124 MG/DL (ref 0–150)
TROPONIN T SERPL-MCNC: 0.06 NG/ML (ref 0–0.03)
TROPONIN T SERPL-MCNC: 0.09 NG/ML (ref 0–0.03)
UROBILINOGEN UR QL STRIP: ABNORMAL
VIT B12 BLD-MCNC: 395 PG/ML (ref 211–946)
VLDLC SERPL-MCNC: 24.8 MG/DL
WBC # BLD AUTO: 8.93 10*3/MM3 (ref 3.4–10.8)
WBC UR QL AUTO: NORMAL /HPF
YEAST URNS QL MICRO: NORMAL /HPF

## 2020-08-26 PROCEDURE — 94799 UNLISTED PULMONARY SVC/PX: CPT

## 2020-08-26 PROCEDURE — 87205 SMEAR GRAM STAIN: CPT | Performed by: INTERNAL MEDICINE

## 2020-08-26 PROCEDURE — 63710000001 INSULIN DETEMIR PER 5 UNITS: Performed by: INTERNAL MEDICINE

## 2020-08-26 PROCEDURE — 80061 LIPID PANEL: CPT | Performed by: INTERNAL MEDICINE

## 2020-08-26 PROCEDURE — 82728 ASSAY OF FERRITIN: CPT | Performed by: INTERNAL MEDICINE

## 2020-08-26 PROCEDURE — 0202U NFCT DS 22 TRGT SARS-COV-2: CPT | Performed by: INTERNAL MEDICINE

## 2020-08-26 PROCEDURE — 63710000001 INSULIN LISPRO (HUMAN) PER 5 UNITS: Performed by: INTERNAL MEDICINE

## 2020-08-26 PROCEDURE — 93005 ELECTROCARDIOGRAM TRACING: CPT | Performed by: INTERNAL MEDICINE

## 2020-08-26 PROCEDURE — 80048 BASIC METABOLIC PNL TOTAL CA: CPT | Performed by: INTERNAL MEDICINE

## 2020-08-26 PROCEDURE — 25010000002 AZITHROMYCIN PER 500 MG: Performed by: EMERGENCY MEDICINE

## 2020-08-26 PROCEDURE — 82306 VITAMIN D 25 HYDROXY: CPT | Performed by: INTERNAL MEDICINE

## 2020-08-26 PROCEDURE — 87899 AGENT NOS ASSAY W/OPTIC: CPT | Performed by: INTERNAL MEDICINE

## 2020-08-26 PROCEDURE — 93970 EXTREMITY STUDY: CPT

## 2020-08-26 PROCEDURE — 85025 COMPLETE CBC W/AUTO DIFF WBC: CPT | Performed by: INTERNAL MEDICINE

## 2020-08-26 PROCEDURE — 82607 VITAMIN B-12: CPT | Performed by: INTERNAL MEDICINE

## 2020-08-26 PROCEDURE — 25010000002 CEFTRIAXONE PER 250 MG: Performed by: INTERNAL MEDICINE

## 2020-08-26 PROCEDURE — 84484 ASSAY OF TROPONIN QUANT: CPT | Performed by: INTERNAL MEDICINE

## 2020-08-26 PROCEDURE — 81001 URINALYSIS AUTO W/SCOPE: CPT | Performed by: INTERNAL MEDICINE

## 2020-08-26 PROCEDURE — 93010 ELECTROCARDIOGRAM REPORT: CPT | Performed by: INTERNAL MEDICINE

## 2020-08-26 PROCEDURE — 82962 GLUCOSE BLOOD TEST: CPT

## 2020-08-26 PROCEDURE — 84466 ASSAY OF TRANSFERRIN: CPT | Performed by: INTERNAL MEDICINE

## 2020-08-26 PROCEDURE — 93306 TTE W/DOPPLER COMPLETE: CPT

## 2020-08-26 PROCEDURE — 99223 1ST HOSP IP/OBS HIGH 75: CPT | Performed by: INTERNAL MEDICINE

## 2020-08-26 PROCEDURE — 83735 ASSAY OF MAGNESIUM: CPT | Performed by: INTERNAL MEDICINE

## 2020-08-26 PROCEDURE — 83970 ASSAY OF PARATHORMONE: CPT | Performed by: INTERNAL MEDICINE

## 2020-08-26 PROCEDURE — 82746 ASSAY OF FOLIC ACID SERUM: CPT | Performed by: INTERNAL MEDICINE

## 2020-08-26 PROCEDURE — 25010000002 FUROSEMIDE PER 20 MG: Performed by: INTERNAL MEDICINE

## 2020-08-26 PROCEDURE — 85045 AUTOMATED RETICULOCYTE COUNT: CPT | Performed by: INTERNAL MEDICINE

## 2020-08-26 PROCEDURE — 83540 ASSAY OF IRON: CPT | Performed by: INTERNAL MEDICINE

## 2020-08-26 PROCEDURE — 25010000002 CEFTRIAXONE PER 250 MG: Performed by: EMERGENCY MEDICINE

## 2020-08-26 PROCEDURE — 83036 HEMOGLOBIN GLYCOSYLATED A1C: CPT | Performed by: INTERNAL MEDICINE

## 2020-08-26 RX ORDER — HYDRALAZINE HYDROCHLORIDE 25 MG/1
25 TABLET, FILM COATED ORAL EVERY 8 HOURS SCHEDULED
Status: DISCONTINUED | OUTPATIENT
Start: 2020-08-26 | End: 2020-08-28

## 2020-08-26 RX ORDER — INSULIN LISPRO 100 U/ML
INJECTION, SOLUTION SUBCUTANEOUS
COMMUNITY

## 2020-08-26 RX ORDER — ASPIRIN 81 MG/1
81 TABLET, CHEWABLE ORAL DAILY
Status: DISCONTINUED | OUTPATIENT
Start: 2020-08-26 | End: 2020-09-04 | Stop reason: HOSPADM

## 2020-08-26 RX ORDER — ALBUTEROL SULFATE 90 UG/1
2 AEROSOL, METERED RESPIRATORY (INHALATION) EVERY 4 HOURS PRN
COMMUNITY

## 2020-08-26 RX ORDER — FAMOTIDINE 20 MG/1
20 TABLET, FILM COATED ORAL
Status: DISCONTINUED | OUTPATIENT
Start: 2020-08-26 | End: 2020-09-04 | Stop reason: HOSPADM

## 2020-08-26 RX ORDER — ACETAMINOPHEN 650 MG/1
650 SUPPOSITORY RECTAL EVERY 4 HOURS PRN
Status: DISCONTINUED | OUTPATIENT
Start: 2020-08-26 | End: 2020-09-04 | Stop reason: HOSPADM

## 2020-08-26 RX ORDER — INSULIN GLARGINE 100 [IU]/ML
60 INJECTION, SOLUTION SUBCUTANEOUS 2 TIMES DAILY
COMMUNITY
End: 2020-09-04 | Stop reason: HOSPADM

## 2020-08-26 RX ORDER — NICOTINE 21 MG/24HR
1 PATCH, TRANSDERMAL 24 HOURS TRANSDERMAL
Status: DISCONTINUED | OUTPATIENT
Start: 2020-08-26 | End: 2020-09-04 | Stop reason: HOSPADM

## 2020-08-26 RX ORDER — OMEGA-3S/DHA/EPA/FISH OIL/D3 300MG-1000
1000 CAPSULE ORAL DAILY
Status: DISCONTINUED | OUTPATIENT
Start: 2020-08-26 | End: 2020-09-04 | Stop reason: HOSPADM

## 2020-08-26 RX ORDER — HYDROCHLOROTHIAZIDE 25 MG/1
25 TABLET ORAL DAILY
Status: DISCONTINUED | OUTPATIENT
Start: 2020-08-26 | End: 2020-08-26

## 2020-08-26 RX ORDER — AZITHROMYCIN 250 MG/1
250 TABLET, FILM COATED ORAL NIGHTLY
Status: COMPLETED | OUTPATIENT
Start: 2020-08-26 | End: 2020-08-29

## 2020-08-26 RX ORDER — NICOTINE POLACRILEX 4 MG
15 LOZENGE BUCCAL
Status: DISCONTINUED | OUTPATIENT
Start: 2020-08-26 | End: 2020-09-04 | Stop reason: HOSPADM

## 2020-08-26 RX ORDER — IPRATROPIUM BROMIDE AND ALBUTEROL SULFATE 2.5; .5 MG/3ML; MG/3ML
3 SOLUTION RESPIRATORY (INHALATION)
Status: DISCONTINUED | OUTPATIENT
Start: 2020-08-26 | End: 2020-09-02

## 2020-08-26 RX ORDER — HYDRALAZINE HYDROCHLORIDE 100 MG/1
100 TABLET, FILM COATED ORAL 2 TIMES DAILY
COMMUNITY
End: 2020-09-04 | Stop reason: HOSPADM

## 2020-08-26 RX ORDER — FOLIC ACID 1 MG/1
1 TABLET ORAL DAILY
Status: DISCONTINUED | OUTPATIENT
Start: 2020-08-26 | End: 2020-09-04 | Stop reason: HOSPADM

## 2020-08-26 RX ORDER — ACETAMINOPHEN 160 MG/5ML
650 SOLUTION ORAL EVERY 4 HOURS PRN
Status: DISCONTINUED | OUTPATIENT
Start: 2020-08-26 | End: 2020-09-04 | Stop reason: HOSPADM

## 2020-08-26 RX ORDER — LACTULOSE 10 G/15ML
20 SOLUTION ORAL 2 TIMES DAILY PRN
COMMUNITY

## 2020-08-26 RX ORDER — ALPRAZOLAM 0.25 MG/1
1 TABLET ORAL 2 TIMES DAILY PRN
Status: DISCONTINUED | OUTPATIENT
Start: 2020-08-26 | End: 2020-09-04 | Stop reason: HOSPADM

## 2020-08-26 RX ORDER — SODIUM CHLORIDE 0.9 % (FLUSH) 0.9 %
10 SYRINGE (ML) INJECTION EVERY 12 HOURS SCHEDULED
Status: DISCONTINUED | OUTPATIENT
Start: 2020-08-26 | End: 2020-09-04 | Stop reason: HOSPADM

## 2020-08-26 RX ORDER — DOCUSATE SODIUM 100 MG/1
100 CAPSULE, LIQUID FILLED ORAL DAILY
Status: DISCONTINUED | OUTPATIENT
Start: 2020-08-26 | End: 2020-09-04 | Stop reason: HOSPADM

## 2020-08-26 RX ORDER — METOPROLOL TARTRATE 50 MG/1
50 TABLET, FILM COATED ORAL EVERY 12 HOURS SCHEDULED
Status: DISCONTINUED | OUTPATIENT
Start: 2020-08-26 | End: 2020-08-29

## 2020-08-26 RX ORDER — ONDANSETRON 2 MG/ML
4 INJECTION INTRAMUSCULAR; INTRAVENOUS EVERY 6 HOURS PRN
Status: DISCONTINUED | OUTPATIENT
Start: 2020-08-26 | End: 2020-09-04 | Stop reason: HOSPADM

## 2020-08-26 RX ORDER — CHOLECALCIFEROL (VITAMIN D3) 125 MCG
5 CAPSULE ORAL NIGHTLY PRN
Status: DISCONTINUED | OUTPATIENT
Start: 2020-08-26 | End: 2020-09-04 | Stop reason: HOSPADM

## 2020-08-26 RX ORDER — IPRATROPIUM BROMIDE AND ALBUTEROL SULFATE 2.5; .5 MG/3ML; MG/3ML
3 SOLUTION RESPIRATORY (INHALATION) 4 TIMES DAILY PRN
COMMUNITY

## 2020-08-26 RX ORDER — DEXTROSE MONOHYDRATE 25 G/50ML
25 INJECTION, SOLUTION INTRAVENOUS
Status: DISCONTINUED | OUTPATIENT
Start: 2020-08-26 | End: 2020-09-04 | Stop reason: HOSPADM

## 2020-08-26 RX ORDER — SODIUM CHLORIDE 0.9 % (FLUSH) 0.9 %
10 SYRINGE (ML) INJECTION AS NEEDED
Status: DISCONTINUED | OUTPATIENT
Start: 2020-08-26 | End: 2020-09-04 | Stop reason: HOSPADM

## 2020-08-26 RX ORDER — FUROSEMIDE 10 MG/ML
40 INJECTION INTRAMUSCULAR; INTRAVENOUS EVERY 12 HOURS
Status: DISCONTINUED | OUTPATIENT
Start: 2020-08-26 | End: 2020-08-26

## 2020-08-26 RX ORDER — PRAVASTATIN SODIUM 40 MG
80 TABLET ORAL DAILY
Status: DISCONTINUED | OUTPATIENT
Start: 2020-08-26 | End: 2020-09-04 | Stop reason: HOSPADM

## 2020-08-26 RX ORDER — FUROSEMIDE 40 MG/1
40 TABLET ORAL DAILY
Status: DISCONTINUED | OUTPATIENT
Start: 2020-08-26 | End: 2020-08-27

## 2020-08-26 RX ORDER — DOXAZOSIN MESYLATE 1 MG/1
0.5 TABLET ORAL 2 TIMES DAILY
COMMUNITY
End: 2020-09-04 | Stop reason: HOSPADM

## 2020-08-26 RX ORDER — IPRATROPIUM BROMIDE AND ALBUTEROL SULFATE 2.5; .5 MG/3ML; MG/3ML
3 SOLUTION RESPIRATORY (INHALATION) EVERY 4 HOURS PRN
Status: DISCONTINUED | OUTPATIENT
Start: 2020-08-26 | End: 2020-09-04 | Stop reason: HOSPADM

## 2020-08-26 RX ORDER — HYDRALAZINE HYDROCHLORIDE 50 MG/1
100 TABLET, FILM COATED ORAL 2 TIMES DAILY
Status: ON HOLD | COMMUNITY
End: 2020-08-26

## 2020-08-26 RX ORDER — ACETAMINOPHEN 325 MG/1
650 TABLET ORAL EVERY 4 HOURS PRN
Status: DISCONTINUED | OUTPATIENT
Start: 2020-08-26 | End: 2020-09-04 | Stop reason: HOSPADM

## 2020-08-26 RX ADMIN — APIXABAN 5 MG: 5 TABLET, FILM COATED ORAL at 20:41

## 2020-08-26 RX ADMIN — HYDRALAZINE HYDROCHLORIDE 25 MG: 25 TABLET ORAL at 20:41

## 2020-08-26 RX ADMIN — METOPROLOL TARTRATE 50 MG: 50 TABLET, FILM COATED ORAL at 20:41

## 2020-08-26 RX ADMIN — IPRATROPIUM BROMIDE AND ALBUTEROL SULFATE 3 ML: 2.5; .5 SOLUTION RESPIRATORY (INHALATION) at 13:52

## 2020-08-26 RX ADMIN — INSULIN DETEMIR 10 UNITS: 100 INJECTION, SOLUTION SUBCUTANEOUS at 11:17

## 2020-08-26 RX ADMIN — LINAGLIPTIN 5 MG: 5 TABLET, FILM COATED ORAL at 12:00

## 2020-08-26 RX ADMIN — DILTIAZEM HYDROCHLORIDE 10 MG: 5 INJECTION INTRAVENOUS at 01:00

## 2020-08-26 RX ADMIN — INSULIN LISPRO 5 UNITS: 100 INJECTION, SOLUTION INTRAVENOUS; SUBCUTANEOUS at 17:39

## 2020-08-26 RX ADMIN — FUROSEMIDE 40 MG: 10 INJECTION, SOLUTION INTRAMUSCULAR; INTRAVENOUS at 05:18

## 2020-08-26 RX ADMIN — CEFTRIAXONE SODIUM 1 G: 1 INJECTION, POWDER, FOR SOLUTION INTRAMUSCULAR; INTRAVENOUS at 01:02

## 2020-08-26 RX ADMIN — NICOTINE 1 PATCH: 21 PATCH, EXTENDED RELEASE TRANSDERMAL at 08:24

## 2020-08-26 RX ADMIN — PRAVASTATIN SODIUM 80 MG: 40 TABLET ORAL at 08:24

## 2020-08-26 RX ADMIN — APIXABAN 5 MG: 5 TABLET, FILM COATED ORAL at 08:26

## 2020-08-26 RX ADMIN — IPRATROPIUM BROMIDE AND ALBUTEROL SULFATE 3 ML: 2.5; .5 SOLUTION RESPIRATORY (INHALATION) at 16:03

## 2020-08-26 RX ADMIN — SODIUM CHLORIDE, PRESERVATIVE FREE 10 ML: 5 INJECTION INTRAVENOUS at 20:42

## 2020-08-26 RX ADMIN — ALPRAZOLAM 1 MG: 0.25 TABLET ORAL at 08:39

## 2020-08-26 RX ADMIN — PATIROMER 1 PACKET: 8.4 POWDER, FOR SUSPENSION ORAL at 15:15

## 2020-08-26 RX ADMIN — INSULIN LISPRO 3 UNITS: 100 INJECTION, SOLUTION INTRAVENOUS; SUBCUTANEOUS at 12:00

## 2020-08-26 RX ADMIN — AZITHROMYCIN 250 MG: 250 TABLET, FILM COATED ORAL at 20:41

## 2020-08-26 RX ADMIN — METOPROLOL TARTRATE 50 MG: 50 TABLET, FILM COATED ORAL at 08:30

## 2020-08-26 RX ADMIN — IPRATROPIUM BROMIDE AND ALBUTEROL SULFATE 3 ML: 2.5; .5 SOLUTION RESPIRATORY (INHALATION) at 08:17

## 2020-08-26 RX ADMIN — CHOLECALCIFEROL (VITAMIN D3) 10 MCG (400 UNIT) TABLET 1000 UNITS: at 08:24

## 2020-08-26 RX ADMIN — HYDROCHLOROTHIAZIDE 25 MG: 25 TABLET ORAL at 08:24

## 2020-08-26 RX ADMIN — IPRATROPIUM BROMIDE AND ALBUTEROL SULFATE 3 ML: 2.5; .5 SOLUTION RESPIRATORY (INHALATION) at 20:32

## 2020-08-26 RX ADMIN — INSULIN LISPRO 4 UNITS: 100 INJECTION, SOLUTION INTRAVENOUS; SUBCUTANEOUS at 08:33

## 2020-08-26 RX ADMIN — FOLIC ACID 1 MG: 1 TABLET ORAL at 15:15

## 2020-08-26 RX ADMIN — HYDRALAZINE HYDROCHLORIDE 25 MG: 25 TABLET ORAL at 14:00

## 2020-08-26 RX ADMIN — AZITHROMYCIN 500 MG: 500 INJECTION, POWDER, LYOPHILIZED, FOR SOLUTION INTRAVENOUS at 01:34

## 2020-08-26 RX ADMIN — CEFTRIAXONE SODIUM 1 G: 1 INJECTION, POWDER, FOR SOLUTION INTRAMUSCULAR; INTRAVENOUS at 20:42

## 2020-08-26 RX ADMIN — FAMOTIDINE 20 MG: 20 TABLET, FILM COATED ORAL at 08:24

## 2020-08-26 RX ADMIN — ASPIRIN 81 MG CHEWABLE TABLET 81 MG: 81 TABLET CHEWABLE at 08:24

## 2020-08-26 RX ADMIN — FUROSEMIDE 40 MG: 40 TABLET ORAL at 14:00

## 2020-08-26 RX ADMIN — METOPROLOL TARTRATE 50 MG: 50 TABLET, FILM COATED ORAL at 02:56

## 2020-08-26 RX ADMIN — INSULIN LISPRO 3 UNITS: 100 INJECTION, SOLUTION INTRAVENOUS; SUBCUTANEOUS at 17:40

## 2020-08-26 RX ADMIN — FAMOTIDINE 20 MG: 20 TABLET, FILM COATED ORAL at 17:39

## 2020-08-27 ENCOUNTER — APPOINTMENT (OUTPATIENT)
Dept: CARDIOLOGY | Facility: HOSPITAL | Age: 55
End: 2020-08-27

## 2020-08-27 LAB
ANION GAP SERPL CALCULATED.3IONS-SCNC: 12 MMOL/L (ref 5–15)
BH CV ECHO MEAS - DIST REN A EDV LEFT: 7.6 CM/S
BH CV ECHO MEAS - DIST REN A PSV LEFT: 86.9 CM/S
BH CV ECHO MEAS - MID REN A EDV LEFT: 10.3 CM/S
BH CV ECHO MEAS - MID REN A PSV LEFT: 94.5 CM/S
BH CV ECHO MEAS - PROX REN A EDV LEFT: 5.43 CM/S
BH CV ECHO MEAS - PROX REN A PSV LEFT: 56.7 CM/S
BH CV VAS BP LEFT ARM: NORMAL MMHG
BH CV VAS RENAL AORTIC MID EDV: 24 CM/S
BH CV VAS RENAL AORTIC MID PSV: 227 CM/S
BH CV VAS RENAL HILUM LEFT EDV: 3.91 CM/S
BH CV VAS RENAL HILUM LEFT PSV: 34 CM/S
BH CV XLRA MEAS - KID L LEFT: 11.4 CM
BH CV XLRA MEAS RAR LEFT: 0.42
BUN SERPL-MCNC: 53 MG/DL (ref 6–20)
BUN/CREAT SERPL: 18.9 (ref 7–25)
CALCIUM SPEC-SCNC: 9.9 MG/DL (ref 8.6–10.5)
CHLORIDE SERPL-SCNC: 101 MMOL/L (ref 98–107)
CO2 SERPL-SCNC: 23 MMOL/L (ref 22–29)
CREAT SERPL-MCNC: 2.8 MG/DL (ref 0.57–1)
DEPRECATED RDW RBC AUTO: 53.2 FL (ref 37–54)
ERYTHROCYTE [DISTWIDTH] IN BLOOD BY AUTOMATED COUNT: 15.2 % (ref 12.3–15.4)
FERRITIN SERPL-MCNC: 90.27 NG/ML (ref 13–150)
GFR SERPL CREATININE-BSD FRML MDRD: 21 ML/MIN/1.73
GLUCOSE BLDC GLUCOMTR-MCNC: 165 MG/DL (ref 70–130)
GLUCOSE BLDC GLUCOMTR-MCNC: 170 MG/DL (ref 70–130)
GLUCOSE BLDC GLUCOMTR-MCNC: 177 MG/DL (ref 70–130)
GLUCOSE BLDC GLUCOMTR-MCNC: 268 MG/DL (ref 70–130)
GLUCOSE SERPL-MCNC: 268 MG/DL (ref 65–99)
HCT VFR BLD AUTO: 33.5 % (ref 34–46.6)
HGB BLD-MCNC: 10.3 G/DL (ref 12–15.9)
IRON 24H UR-MRATE: 90 MCG/DL (ref 37–145)
IRON SATN MFR SERPL: 31 % (ref 20–50)
LEFT RENAL UPPER PARENCHYMA MAX: 30.9 CM/S
LEFT RENAL UPPER PARENCHYMA MIN: 6.26 CM/S
LEFT RENAL UPPER PARENCHYMA RI: 0.8
MCH RBC QN AUTO: 29.6 PG (ref 26.6–33)
MCHC RBC AUTO-ENTMCNC: 30.7 G/DL (ref 31.5–35.7)
MCV RBC AUTO: 96.3 FL (ref 79–97)
PLATELET # BLD AUTO: 184 10*3/MM3 (ref 140–450)
PMV BLD AUTO: 11.7 FL (ref 6–12)
POTASSIUM SERPL-SCNC: 4.4 MMOL/L (ref 3.5–5.2)
RBC # BLD AUTO: 3.48 10*6/MM3 (ref 3.77–5.28)
SODIUM SERPL-SCNC: 136 MMOL/L (ref 136–145)
TIBC SERPL-MCNC: 292 MCG/DL (ref 298–536)
TRANSFERRIN SERPL-MCNC: 196 MG/DL (ref 200–360)
WBC # BLD AUTO: 11.2 10*3/MM3 (ref 3.4–10.8)

## 2020-08-27 PROCEDURE — 94799 UNLISTED PULMONARY SVC/PX: CPT

## 2020-08-27 PROCEDURE — 63710000001 INSULIN LISPRO (HUMAN) PER 5 UNITS: Performed by: INTERNAL MEDICINE

## 2020-08-27 PROCEDURE — 85027 COMPLETE CBC AUTOMATED: CPT | Performed by: INTERNAL MEDICINE

## 2020-08-27 PROCEDURE — 82728 ASSAY OF FERRITIN: CPT | Performed by: INTERNAL MEDICINE

## 2020-08-27 PROCEDURE — 93975 VASCULAR STUDY: CPT

## 2020-08-27 PROCEDURE — 84466 ASSAY OF TRANSFERRIN: CPT | Performed by: INTERNAL MEDICINE

## 2020-08-27 PROCEDURE — 99232 SBSQ HOSP IP/OBS MODERATE 35: CPT | Performed by: HOSPITALIST

## 2020-08-27 PROCEDURE — 80048 BASIC METABOLIC PNL TOTAL CA: CPT | Performed by: INTERNAL MEDICINE

## 2020-08-27 PROCEDURE — 82962 GLUCOSE BLOOD TEST: CPT

## 2020-08-27 PROCEDURE — 25010000002 HYDRALAZINE PER 20 MG: Performed by: PHYSICIAN ASSISTANT

## 2020-08-27 PROCEDURE — 25010000002 CEFTRIAXONE PER 250 MG: Performed by: INTERNAL MEDICINE

## 2020-08-27 PROCEDURE — 63710000001 INSULIN DETEMIR PER 5 UNITS: Performed by: INTERNAL MEDICINE

## 2020-08-27 PROCEDURE — 83540 ASSAY OF IRON: CPT | Performed by: INTERNAL MEDICINE

## 2020-08-27 RX ORDER — HYDRALAZINE HYDROCHLORIDE 20 MG/ML
20 INJECTION INTRAMUSCULAR; INTRAVENOUS ONCE
Status: COMPLETED | OUTPATIENT
Start: 2020-08-27 | End: 2020-08-27

## 2020-08-27 RX ORDER — FUROSEMIDE 40 MG/1
40 TABLET ORAL
Status: DISCONTINUED | OUTPATIENT
Start: 2020-08-27 | End: 2020-09-01

## 2020-08-27 RX ORDER — AMLODIPINE BESYLATE 5 MG/1
5 TABLET ORAL
Status: DISCONTINUED | OUTPATIENT
Start: 2020-08-27 | End: 2020-09-04 | Stop reason: HOSPADM

## 2020-08-27 RX ADMIN — INSULIN DETEMIR 10 UNITS: 100 INJECTION, SOLUTION SUBCUTANEOUS at 10:21

## 2020-08-27 RX ADMIN — METOPROLOL TARTRATE 50 MG: 50 TABLET, FILM COATED ORAL at 10:19

## 2020-08-27 RX ADMIN — IPRATROPIUM BROMIDE AND ALBUTEROL SULFATE 3 ML: 2.5; .5 SOLUTION RESPIRATORY (INHALATION) at 08:41

## 2020-08-27 RX ADMIN — IPRATROPIUM BROMIDE AND ALBUTEROL SULFATE 3 ML: 2.5; .5 SOLUTION RESPIRATORY (INHALATION) at 13:04

## 2020-08-27 RX ADMIN — ALPRAZOLAM 1 MG: 0.25 TABLET ORAL at 03:36

## 2020-08-27 RX ADMIN — NICOTINE 1 PATCH: 21 PATCH, EXTENDED RELEASE TRANSDERMAL at 10:20

## 2020-08-27 RX ADMIN — AZITHROMYCIN 250 MG: 250 TABLET, FILM COATED ORAL at 20:24

## 2020-08-27 RX ADMIN — FUROSEMIDE 40 MG: 40 TABLET ORAL at 10:18

## 2020-08-27 RX ADMIN — HYDRALAZINE HYDROCHLORIDE 20 MG: 20 INJECTION INTRAMUSCULAR; INTRAVENOUS at 23:57

## 2020-08-27 RX ADMIN — SODIUM CHLORIDE, PRESERVATIVE FREE 10 ML: 5 INJECTION INTRAVENOUS at 20:25

## 2020-08-27 RX ADMIN — IPRATROPIUM BROMIDE AND ALBUTEROL SULFATE 3 ML: 2.5; .5 SOLUTION RESPIRATORY (INHALATION) at 16:28

## 2020-08-27 RX ADMIN — MELATONIN TAB 5 MG 5 MG: 5 TAB at 20:24

## 2020-08-27 RX ADMIN — HYDRALAZINE HYDROCHLORIDE 25 MG: 25 TABLET ORAL at 20:24

## 2020-08-27 RX ADMIN — INSULIN LISPRO 3 UNITS: 100 INJECTION, SOLUTION INTRAVENOUS; SUBCUTANEOUS at 16:55

## 2020-08-27 RX ADMIN — INSULIN LISPRO 4 UNITS: 100 INJECTION, SOLUTION INTRAVENOUS; SUBCUTANEOUS at 10:20

## 2020-08-27 RX ADMIN — INSULIN LISPRO 2 UNITS: 100 INJECTION, SOLUTION INTRAVENOUS; SUBCUTANEOUS at 12:30

## 2020-08-27 RX ADMIN — HYDRALAZINE HYDROCHLORIDE 25 MG: 25 TABLET ORAL at 03:30

## 2020-08-27 RX ADMIN — APIXABAN 5 MG: 5 TABLET, FILM COATED ORAL at 10:19

## 2020-08-27 RX ADMIN — ASPIRIN 81 MG CHEWABLE TABLET 81 MG: 81 TABLET CHEWABLE at 10:19

## 2020-08-27 RX ADMIN — INSULIN LISPRO 2 UNITS: 100 INJECTION, SOLUTION INTRAVENOUS; SUBCUTANEOUS at 16:54

## 2020-08-27 RX ADMIN — CEFTRIAXONE SODIUM 1 G: 1 INJECTION, POWDER, FOR SOLUTION INTRAMUSCULAR; INTRAVENOUS at 20:24

## 2020-08-27 RX ADMIN — PRAVASTATIN SODIUM 80 MG: 40 TABLET ORAL at 10:19

## 2020-08-27 RX ADMIN — IPRATROPIUM BROMIDE AND ALBUTEROL SULFATE 3 ML: 2.5; .5 SOLUTION RESPIRATORY (INHALATION) at 03:08

## 2020-08-27 RX ADMIN — ALPRAZOLAM 1 MG: 0.25 TABLET ORAL at 20:34

## 2020-08-27 RX ADMIN — IPRATROPIUM BROMIDE AND ALBUTEROL SULFATE 3 ML: 2.5; .5 SOLUTION RESPIRATORY (INHALATION) at 21:03

## 2020-08-27 RX ADMIN — FAMOTIDINE 20 MG: 20 TABLET, FILM COATED ORAL at 16:52

## 2020-08-27 RX ADMIN — HYDRALAZINE HYDROCHLORIDE 25 MG: 25 TABLET ORAL at 12:29

## 2020-08-27 RX ADMIN — METOPROLOL TARTRATE 50 MG: 50 TABLET, FILM COATED ORAL at 20:24

## 2020-08-27 RX ADMIN — CHOLECALCIFEROL (VITAMIN D3) 10 MCG (400 UNIT) TABLET 1000 UNITS: at 10:18

## 2020-08-27 RX ADMIN — APIXABAN 5 MG: 5 TABLET, FILM COATED ORAL at 20:24

## 2020-08-27 RX ADMIN — AMLODIPINE BESYLATE 5 MG: 5 TABLET ORAL at 10:19

## 2020-08-27 RX ADMIN — FOLIC ACID 1 MG: 1 TABLET ORAL at 10:18

## 2020-08-27 RX ADMIN — LINAGLIPTIN 5 MG: 5 TABLET, FILM COATED ORAL at 10:19

## 2020-08-27 RX ADMIN — DOCUSATE SODIUM 100 MG: 100 CAPSULE, LIQUID FILLED ORAL at 10:19

## 2020-08-27 RX ADMIN — FUROSEMIDE 40 MG: 40 TABLET ORAL at 19:18

## 2020-08-27 RX ADMIN — INSULIN LISPRO 3 UNITS: 100 INJECTION, SOLUTION INTRAVENOUS; SUBCUTANEOUS at 12:30

## 2020-08-27 RX ADMIN — FAMOTIDINE 20 MG: 20 TABLET, FILM COATED ORAL at 10:18

## 2020-08-27 RX ADMIN — INSULIN LISPRO 3 UNITS: 100 INJECTION, SOLUTION INTRAVENOUS; SUBCUTANEOUS at 10:20

## 2020-08-28 LAB
GLUCOSE BLDC GLUCOMTR-MCNC: 159 MG/DL (ref 70–130)
GLUCOSE BLDC GLUCOMTR-MCNC: 178 MG/DL (ref 70–130)
GLUCOSE BLDC GLUCOMTR-MCNC: 179 MG/DL (ref 70–130)
GLUCOSE BLDC GLUCOMTR-MCNC: 181 MG/DL (ref 70–130)

## 2020-08-28 PROCEDURE — 63710000001 INSULIN LISPRO (HUMAN) PER 5 UNITS: Performed by: INTERNAL MEDICINE

## 2020-08-28 PROCEDURE — 82962 GLUCOSE BLOOD TEST: CPT

## 2020-08-28 PROCEDURE — 63710000001 INSULIN DETEMIR PER 5 UNITS: Performed by: INTERNAL MEDICINE

## 2020-08-28 PROCEDURE — 25010000002 CEFTRIAXONE PER 250 MG: Performed by: INTERNAL MEDICINE

## 2020-08-28 PROCEDURE — 94799 UNLISTED PULMONARY SVC/PX: CPT

## 2020-08-28 PROCEDURE — 99232 SBSQ HOSP IP/OBS MODERATE 35: CPT | Performed by: HOSPITALIST

## 2020-08-28 RX ORDER — METOPROLOL TARTRATE 5 MG/5ML
10 INJECTION INTRAVENOUS ONCE
Status: COMPLETED | OUTPATIENT
Start: 2020-08-28 | End: 2020-08-28

## 2020-08-28 RX ORDER — LACTULOSE 10 G/15ML
10 SOLUTION ORAL 3 TIMES DAILY
Status: DISCONTINUED | OUTPATIENT
Start: 2020-08-28 | End: 2020-08-30

## 2020-08-28 RX ORDER — HYDRALAZINE HYDROCHLORIDE 50 MG/1
50 TABLET, FILM COATED ORAL EVERY 8 HOURS SCHEDULED
Status: DISCONTINUED | OUTPATIENT
Start: 2020-08-28 | End: 2020-08-28

## 2020-08-28 RX ADMIN — INSULIN LISPRO 3 UNITS: 100 INJECTION, SOLUTION INTRAVENOUS; SUBCUTANEOUS at 18:55

## 2020-08-28 RX ADMIN — FOLIC ACID 1 MG: 1 TABLET ORAL at 09:10

## 2020-08-28 RX ADMIN — NICARDIPINE HYDROCHLORIDE 5 MG/HR: 0.1 INJECTION, SOLUTION INTRAVENOUS at 23:10

## 2020-08-28 RX ADMIN — LACTULOSE 10 G: 20 SOLUTION ORAL at 22:22

## 2020-08-28 RX ADMIN — NICARDIPINE HYDROCHLORIDE 5 MG/HR: 0.1 INJECTION, SOLUTION INTRAVENOUS at 05:30

## 2020-08-28 RX ADMIN — HYDRALAZINE HYDROCHLORIDE 25 MG: 25 TABLET ORAL at 05:30

## 2020-08-28 RX ADMIN — CEFTRIAXONE SODIUM 1 G: 1 INJECTION, POWDER, FOR SOLUTION INTRAMUSCULAR; INTRAVENOUS at 22:23

## 2020-08-28 RX ADMIN — INSULIN LISPRO 2 UNITS: 100 INJECTION, SOLUTION INTRAVENOUS; SUBCUTANEOUS at 12:52

## 2020-08-28 RX ADMIN — FUROSEMIDE 40 MG: 40 TABLET ORAL at 09:10

## 2020-08-28 RX ADMIN — INSULIN LISPRO 3 UNITS: 100 INJECTION, SOLUTION INTRAVENOUS; SUBCUTANEOUS at 12:53

## 2020-08-28 RX ADMIN — METOPROLOL TARTRATE 50 MG: 50 TABLET, FILM COATED ORAL at 22:22

## 2020-08-28 RX ADMIN — HYDRALAZINE HYDROCHLORIDE 75 MG: 50 TABLET, FILM COATED ORAL at 12:58

## 2020-08-28 RX ADMIN — ALPRAZOLAM 1 MG: 0.25 TABLET ORAL at 09:24

## 2020-08-28 RX ADMIN — IPRATROPIUM BROMIDE AND ALBUTEROL SULFATE 3 ML: 2.5; .5 SOLUTION RESPIRATORY (INHALATION) at 12:01

## 2020-08-28 RX ADMIN — HYDRALAZINE HYDROCHLORIDE 75 MG: 50 TABLET, FILM COATED ORAL at 22:21

## 2020-08-28 RX ADMIN — ASPIRIN 81 MG CHEWABLE TABLET 81 MG: 81 TABLET CHEWABLE at 09:10

## 2020-08-28 RX ADMIN — IPRATROPIUM BROMIDE AND ALBUTEROL SULFATE 3 ML: 2.5; .5 SOLUTION RESPIRATORY (INHALATION) at 17:06

## 2020-08-28 RX ADMIN — INSULIN LISPRO 2 UNITS: 100 INJECTION, SOLUTION INTRAVENOUS; SUBCUTANEOUS at 18:55

## 2020-08-28 RX ADMIN — LINAGLIPTIN 5 MG: 5 TABLET, FILM COATED ORAL at 09:10

## 2020-08-28 RX ADMIN — IPRATROPIUM BROMIDE AND ALBUTEROL SULFATE 3 ML: 2.5; .5 SOLUTION RESPIRATORY (INHALATION) at 03:22

## 2020-08-28 RX ADMIN — IPRATROPIUM BROMIDE AND ALBUTEROL SULFATE 3 ML: 2.5; .5 SOLUTION RESPIRATORY (INHALATION) at 19:11

## 2020-08-28 RX ADMIN — CHOLECALCIFEROL (VITAMIN D3) 10 MCG (400 UNIT) TABLET 1000 UNITS: at 12:54

## 2020-08-28 RX ADMIN — APIXABAN 5 MG: 5 TABLET, FILM COATED ORAL at 09:11

## 2020-08-28 RX ADMIN — NICOTINE 1 PATCH: 21 PATCH, EXTENDED RELEASE TRANSDERMAL at 09:11

## 2020-08-28 RX ADMIN — METOROPROLOL TARTRATE 10 MG: 5 INJECTION, SOLUTION INTRAVENOUS at 02:12

## 2020-08-28 RX ADMIN — AMLODIPINE BESYLATE 5 MG: 5 TABLET ORAL at 09:10

## 2020-08-28 RX ADMIN — FUROSEMIDE 40 MG: 40 TABLET ORAL at 18:55

## 2020-08-28 RX ADMIN — INSULIN DETEMIR 10 UNITS: 100 INJECTION, SOLUTION SUBCUTANEOUS at 09:13

## 2020-08-28 RX ADMIN — PRAVASTATIN SODIUM 80 MG: 40 TABLET ORAL at 09:10

## 2020-08-28 RX ADMIN — FAMOTIDINE 20 MG: 20 TABLET, FILM COATED ORAL at 18:55

## 2020-08-28 RX ADMIN — APIXABAN 5 MG: 5 TABLET, FILM COATED ORAL at 22:22

## 2020-08-28 RX ADMIN — METOPROLOL TARTRATE 50 MG: 50 TABLET, FILM COATED ORAL at 09:10

## 2020-08-28 RX ADMIN — FAMOTIDINE 20 MG: 20 TABLET, FILM COATED ORAL at 09:10

## 2020-08-28 RX ADMIN — DOCUSATE SODIUM 100 MG: 100 CAPSULE, LIQUID FILLED ORAL at 09:10

## 2020-08-28 RX ADMIN — SODIUM CHLORIDE, PRESERVATIVE FREE 10 ML: 5 INJECTION INTRAVENOUS at 22:23

## 2020-08-28 RX ADMIN — INSULIN LISPRO 3 UNITS: 100 INJECTION, SOLUTION INTRAVENOUS; SUBCUTANEOUS at 09:12

## 2020-08-28 RX ADMIN — INSULIN LISPRO 2 UNITS: 100 INJECTION, SOLUTION INTRAVENOUS; SUBCUTANEOUS at 09:13

## 2020-08-28 RX ADMIN — AZITHROMYCIN 250 MG: 250 TABLET, FILM COATED ORAL at 22:22

## 2020-08-28 RX ADMIN — ALPRAZOLAM 1 MG: 0.25 TABLET ORAL at 22:21

## 2020-08-29 LAB
ALBUMIN SERPL-MCNC: 3.4 G/DL (ref 3.5–5.2)
ANION GAP SERPL CALCULATED.3IONS-SCNC: 12 MMOL/L (ref 5–15)
BUN SERPL-MCNC: 65 MG/DL (ref 6–20)
BUN/CREAT SERPL: 23 (ref 7–25)
CALCIUM SPEC-SCNC: 9.7 MG/DL (ref 8.6–10.5)
CHLORIDE SERPL-SCNC: 97 MMOL/L (ref 98–107)
CO2 SERPL-SCNC: 28 MMOL/L (ref 22–29)
CREAT SERPL-MCNC: 2.82 MG/DL (ref 0.57–1)
GFR SERPL CREATININE-BSD FRML MDRD: 21 ML/MIN/1.73
GLUCOSE BLDC GLUCOMTR-MCNC: 110 MG/DL (ref 70–130)
GLUCOSE BLDC GLUCOMTR-MCNC: 153 MG/DL (ref 70–130)
GLUCOSE BLDC GLUCOMTR-MCNC: 169 MG/DL (ref 70–130)
GLUCOSE BLDC GLUCOMTR-MCNC: 214 MG/DL (ref 70–130)
GLUCOSE SERPL-MCNC: 161 MG/DL (ref 65–99)
PHOSPHATE SERPL-MCNC: 4.2 MG/DL (ref 2.5–4.5)
POTASSIUM SERPL-SCNC: 3.2 MMOL/L (ref 3.5–5.2)
SODIUM SERPL-SCNC: 137 MMOL/L (ref 136–145)

## 2020-08-29 PROCEDURE — 63710000001 INSULIN DETEMIR PER 5 UNITS: Performed by: INTERNAL MEDICINE

## 2020-08-29 PROCEDURE — 63710000001 INSULIN LISPRO (HUMAN) PER 5 UNITS: Performed by: INTERNAL MEDICINE

## 2020-08-29 PROCEDURE — 80069 RENAL FUNCTION PANEL: CPT | Performed by: INTERNAL MEDICINE

## 2020-08-29 PROCEDURE — 93010 ELECTROCARDIOGRAM REPORT: CPT | Performed by: INTERNAL MEDICINE

## 2020-08-29 PROCEDURE — 94799 UNLISTED PULMONARY SVC/PX: CPT

## 2020-08-29 PROCEDURE — 82962 GLUCOSE BLOOD TEST: CPT

## 2020-08-29 PROCEDURE — 25010000002 CEFTRIAXONE PER 250 MG: Performed by: INTERNAL MEDICINE

## 2020-08-29 PROCEDURE — 99232 SBSQ HOSP IP/OBS MODERATE 35: CPT | Performed by: HOSPITALIST

## 2020-08-29 PROCEDURE — 93005 ELECTROCARDIOGRAM TRACING: CPT | Performed by: HOSPITALIST

## 2020-08-29 RX ORDER — DILTIAZEM HCL IN NACL,ISO-OSM 125 MG/125
5-15 PLASTIC BAG, INJECTION (ML) INTRAVENOUS
Status: DISCONTINUED | OUTPATIENT
Start: 2020-08-29 | End: 2020-09-04 | Stop reason: HOSPADM

## 2020-08-29 RX ORDER — HYDRALAZINE HYDROCHLORIDE 50 MG/1
100 TABLET, FILM COATED ORAL EVERY 8 HOURS SCHEDULED
Status: DISCONTINUED | OUTPATIENT
Start: 2020-08-29 | End: 2020-09-04 | Stop reason: HOSPADM

## 2020-08-29 RX ORDER — DILTIAZEM HYDROCHLORIDE 5 MG/ML
20 INJECTION INTRAVENOUS ONCE
Status: COMPLETED | OUTPATIENT
Start: 2020-08-29 | End: 2020-08-29

## 2020-08-29 RX ORDER — METOPROLOL TARTRATE 50 MG/1
50 TABLET, FILM COATED ORAL 3 TIMES DAILY
Status: DISCONTINUED | OUTPATIENT
Start: 2020-08-29 | End: 2020-08-31

## 2020-08-29 RX ORDER — METOPROLOL TARTRATE 5 MG/5ML
5 INJECTION INTRAVENOUS ONCE
Status: COMPLETED | OUTPATIENT
Start: 2020-08-29 | End: 2020-08-29

## 2020-08-29 RX ORDER — POTASSIUM CHLORIDE 750 MG/1
40 CAPSULE, EXTENDED RELEASE ORAL ONCE
Status: COMPLETED | OUTPATIENT
Start: 2020-08-29 | End: 2020-08-29

## 2020-08-29 RX ORDER — TERAZOSIN 5 MG/1
5 CAPSULE ORAL EVERY 12 HOURS SCHEDULED
Status: DISCONTINUED | OUTPATIENT
Start: 2020-08-29 | End: 2020-09-02

## 2020-08-29 RX ADMIN — AMLODIPINE BESYLATE 5 MG: 5 TABLET ORAL at 08:39

## 2020-08-29 RX ADMIN — NICOTINE 1 PATCH: 21 PATCH, EXTENDED RELEASE TRANSDERMAL at 08:39

## 2020-08-29 RX ADMIN — TERAZOSIN HYDROCHLORIDE 5 MG: 5 CAPSULE ORAL at 11:00

## 2020-08-29 RX ADMIN — INSULIN LISPRO 3 UNITS: 100 INJECTION, SOLUTION INTRAVENOUS; SUBCUTANEOUS at 17:34

## 2020-08-29 RX ADMIN — ALPRAZOLAM 1 MG: 0.25 TABLET ORAL at 12:56

## 2020-08-29 RX ADMIN — ASPIRIN 81 MG CHEWABLE TABLET 81 MG: 81 TABLET CHEWABLE at 08:39

## 2020-08-29 RX ADMIN — IPRATROPIUM BROMIDE AND ALBUTEROL SULFATE 3 ML: 2.5; .5 SOLUTION RESPIRATORY (INHALATION) at 07:04

## 2020-08-29 RX ADMIN — CHOLECALCIFEROL (VITAMIN D3) 10 MCG (400 UNIT) TABLET 1000 UNITS: at 08:39

## 2020-08-29 RX ADMIN — INSULIN LISPRO 3 UNITS: 100 INJECTION, SOLUTION INTRAVENOUS; SUBCUTANEOUS at 12:51

## 2020-08-29 RX ADMIN — PRAVASTATIN SODIUM 80 MG: 40 TABLET ORAL at 08:40

## 2020-08-29 RX ADMIN — INSULIN LISPRO 2 UNITS: 100 INJECTION, SOLUTION INTRAVENOUS; SUBCUTANEOUS at 08:41

## 2020-08-29 RX ADMIN — METOPROLOL TARTRATE 5 MG: 5 INJECTION INTRAVENOUS at 13:25

## 2020-08-29 RX ADMIN — FAMOTIDINE 20 MG: 20 TABLET, FILM COATED ORAL at 17:34

## 2020-08-29 RX ADMIN — DILTIAZEM HYDROCHLORIDE 20 MG: 5 INJECTION INTRAVENOUS at 14:57

## 2020-08-29 RX ADMIN — APIXABAN 5 MG: 5 TABLET, FILM COATED ORAL at 08:39

## 2020-08-29 RX ADMIN — Medication 5 MG/HR: at 14:56

## 2020-08-29 RX ADMIN — IPRATROPIUM BROMIDE AND ALBUTEROL SULFATE 3 ML: 2.5; .5 SOLUTION RESPIRATORY (INHALATION) at 16:32

## 2020-08-29 RX ADMIN — AZITHROMYCIN 250 MG: 250 TABLET, FILM COATED ORAL at 20:23

## 2020-08-29 RX ADMIN — METOPROLOL TARTRATE 50 MG: 50 TABLET, FILM COATED ORAL at 08:40

## 2020-08-29 RX ADMIN — TERAZOSIN HYDROCHLORIDE 5 MG: 5 CAPSULE ORAL at 20:23

## 2020-08-29 RX ADMIN — DOCUSATE SODIUM 100 MG: 100 CAPSULE, LIQUID FILLED ORAL at 08:39

## 2020-08-29 RX ADMIN — HYDRALAZINE HYDROCHLORIDE 75 MG: 50 TABLET, FILM COATED ORAL at 05:12

## 2020-08-29 RX ADMIN — INSULIN DETEMIR 10 UNITS: 100 INJECTION, SOLUTION SUBCUTANEOUS at 08:45

## 2020-08-29 RX ADMIN — APIXABAN 5 MG: 5 TABLET, FILM COATED ORAL at 20:23

## 2020-08-29 RX ADMIN — LACTULOSE 10 G: 20 SOLUTION ORAL at 17:35

## 2020-08-29 RX ADMIN — POTASSIUM CHLORIDE 40 MEQ: 10 CAPSULE, COATED, EXTENDED RELEASE ORAL at 11:00

## 2020-08-29 RX ADMIN — FAMOTIDINE 20 MG: 20 TABLET, FILM COATED ORAL at 08:39

## 2020-08-29 RX ADMIN — HYDRALAZINE HYDROCHLORIDE 100 MG: 50 TABLET, FILM COATED ORAL at 12:51

## 2020-08-29 RX ADMIN — FOLIC ACID 1 MG: 1 TABLET ORAL at 08:39

## 2020-08-29 RX ADMIN — CEFTRIAXONE SODIUM 1 G: 1 INJECTION, POWDER, FOR SOLUTION INTRAMUSCULAR; INTRAVENOUS at 20:24

## 2020-08-29 RX ADMIN — METOPROLOL TARTRATE 50 MG: 50 TABLET, FILM COATED ORAL at 20:23

## 2020-08-29 RX ADMIN — IPRATROPIUM BROMIDE AND ALBUTEROL SULFATE 3 ML: 2.5; .5 SOLUTION RESPIRATORY (INHALATION) at 19:30

## 2020-08-29 RX ADMIN — INSULIN LISPRO 3 UNITS: 100 INJECTION, SOLUTION INTRAVENOUS; SUBCUTANEOUS at 08:40

## 2020-08-29 RX ADMIN — NICARDIPINE HYDROCHLORIDE 2.5 MG/HR: 0.1 INJECTION, SOLUTION INTRAVENOUS at 05:13

## 2020-08-29 RX ADMIN — FUROSEMIDE 40 MG: 40 TABLET ORAL at 17:34

## 2020-08-29 RX ADMIN — LINAGLIPTIN 5 MG: 5 TABLET, FILM COATED ORAL at 08:40

## 2020-08-29 RX ADMIN — LACTULOSE 10 G: 20 SOLUTION ORAL at 08:41

## 2020-08-29 RX ADMIN — METOPROLOL TARTRATE 50 MG: 50 TABLET, FILM COATED ORAL at 13:42

## 2020-08-29 RX ADMIN — FUROSEMIDE 40 MG: 40 TABLET ORAL at 08:39

## 2020-08-29 RX ADMIN — SODIUM CHLORIDE, PRESERVATIVE FREE 10 ML: 5 INJECTION INTRAVENOUS at 20:24

## 2020-08-29 RX ADMIN — HYDRALAZINE HYDROCHLORIDE 100 MG: 50 TABLET, FILM COATED ORAL at 20:25

## 2020-08-30 LAB
BACTERIA SPEC AEROBE CULT: NORMAL
BACTERIA SPEC AEROBE CULT: NORMAL
GLUCOSE BLDC GLUCOMTR-MCNC: 132 MG/DL (ref 70–130)
GLUCOSE BLDC GLUCOMTR-MCNC: 196 MG/DL (ref 70–130)
GLUCOSE BLDC GLUCOMTR-MCNC: 209 MG/DL (ref 70–130)
GLUCOSE BLDC GLUCOMTR-MCNC: 226 MG/DL (ref 70–130)
PROT UR-MCNC: 56.5 MG/DL

## 2020-08-30 PROCEDURE — 99232 SBSQ HOSP IP/OBS MODERATE 35: CPT | Performed by: HOSPITALIST

## 2020-08-30 PROCEDURE — 94799 UNLISTED PULMONARY SVC/PX: CPT

## 2020-08-30 PROCEDURE — 63710000001 INSULIN LISPRO (HUMAN) PER 5 UNITS: Performed by: INTERNAL MEDICINE

## 2020-08-30 PROCEDURE — 93005 ELECTROCARDIOGRAM TRACING: CPT | Performed by: INTERNAL MEDICINE

## 2020-08-30 PROCEDURE — 81001 URINALYSIS AUTO W/SCOPE: CPT | Performed by: INTERNAL MEDICINE

## 2020-08-30 PROCEDURE — 82570 ASSAY OF URINE CREATININE: CPT | Performed by: INTERNAL MEDICINE

## 2020-08-30 PROCEDURE — 84156 ASSAY OF PROTEIN URINE: CPT | Performed by: INTERNAL MEDICINE

## 2020-08-30 PROCEDURE — 82962 GLUCOSE BLOOD TEST: CPT

## 2020-08-30 PROCEDURE — 25010000002 CEFTRIAXONE PER 250 MG: Performed by: INTERNAL MEDICINE

## 2020-08-30 PROCEDURE — 63710000001 INSULIN DETEMIR PER 5 UNITS: Performed by: INTERNAL MEDICINE

## 2020-08-30 RX ORDER — LACTULOSE 10 G/15ML
30 SOLUTION ORAL 3 TIMES DAILY
Status: DISCONTINUED | OUTPATIENT
Start: 2020-08-30 | End: 2020-08-31

## 2020-08-30 RX ADMIN — FAMOTIDINE 20 MG: 20 TABLET, FILM COATED ORAL at 17:12

## 2020-08-30 RX ADMIN — TERAZOSIN HYDROCHLORIDE 5 MG: 5 CAPSULE ORAL at 20:42

## 2020-08-30 RX ADMIN — INSULIN LISPRO 3 UNITS: 100 INJECTION, SOLUTION INTRAVENOUS; SUBCUTANEOUS at 17:13

## 2020-08-30 RX ADMIN — HYDRALAZINE HYDROCHLORIDE 100 MG: 50 TABLET, FILM COATED ORAL at 14:01

## 2020-08-30 RX ADMIN — TERAZOSIN HYDROCHLORIDE 5 MG: 5 CAPSULE ORAL at 09:18

## 2020-08-30 RX ADMIN — IPRATROPIUM BROMIDE AND ALBUTEROL SULFATE 3 ML: 2.5; .5 SOLUTION RESPIRATORY (INHALATION) at 20:01

## 2020-08-30 RX ADMIN — INSULIN LISPRO 2 UNITS: 100 INJECTION, SOLUTION INTRAVENOUS; SUBCUTANEOUS at 17:12

## 2020-08-30 RX ADMIN — INSULIN LISPRO 3 UNITS: 100 INJECTION, SOLUTION INTRAVENOUS; SUBCUTANEOUS at 09:37

## 2020-08-30 RX ADMIN — ALPRAZOLAM 1 MG: 0.25 TABLET ORAL at 01:59

## 2020-08-30 RX ADMIN — FAMOTIDINE 20 MG: 20 TABLET, FILM COATED ORAL at 06:21

## 2020-08-30 RX ADMIN — DOCUSATE SODIUM 100 MG: 100 CAPSULE, LIQUID FILLED ORAL at 09:18

## 2020-08-30 RX ADMIN — METOPROLOL TARTRATE 50 MG: 50 TABLET, FILM COATED ORAL at 09:18

## 2020-08-30 RX ADMIN — APIXABAN 5 MG: 5 TABLET, FILM COATED ORAL at 20:42

## 2020-08-30 RX ADMIN — INSULIN DETEMIR 10 UNITS: 100 INJECTION, SOLUTION SUBCUTANEOUS at 09:19

## 2020-08-30 RX ADMIN — ALPRAZOLAM 1 MG: 0.25 TABLET ORAL at 14:01

## 2020-08-30 RX ADMIN — INSULIN LISPRO 4 UNITS: 100 INJECTION, SOLUTION INTRAVENOUS; SUBCUTANEOUS at 09:37

## 2020-08-30 RX ADMIN — INSULIN LISPRO 3 UNITS: 100 INJECTION, SOLUTION INTRAVENOUS; SUBCUTANEOUS at 12:14

## 2020-08-30 RX ADMIN — LACTULOSE 10 G: 20 SOLUTION ORAL at 16:28

## 2020-08-30 RX ADMIN — HYDRALAZINE HYDROCHLORIDE 100 MG: 50 TABLET, FILM COATED ORAL at 05:15

## 2020-08-30 RX ADMIN — APIXABAN 5 MG: 5 TABLET, FILM COATED ORAL at 09:18

## 2020-08-30 RX ADMIN — CEFTRIAXONE SODIUM 1 G: 1 INJECTION, POWDER, FOR SOLUTION INTRAMUSCULAR; INTRAVENOUS at 20:42

## 2020-08-30 RX ADMIN — FUROSEMIDE 40 MG: 40 TABLET ORAL at 09:18

## 2020-08-30 RX ADMIN — NICOTINE 1 PATCH: 21 PATCH, EXTENDED RELEASE TRANSDERMAL at 09:19

## 2020-08-30 RX ADMIN — LINAGLIPTIN 5 MG: 5 TABLET, FILM COATED ORAL at 09:19

## 2020-08-30 RX ADMIN — PRAVASTATIN SODIUM 80 MG: 40 TABLET ORAL at 09:18

## 2020-08-30 RX ADMIN — IPRATROPIUM BROMIDE AND ALBUTEROL SULFATE 3 ML: 2.5; .5 SOLUTION RESPIRATORY (INHALATION) at 16:01

## 2020-08-30 RX ADMIN — FOLIC ACID 1 MG: 1 TABLET ORAL at 09:18

## 2020-08-30 RX ADMIN — HYDRALAZINE HYDROCHLORIDE 100 MG: 50 TABLET, FILM COATED ORAL at 20:42

## 2020-08-30 RX ADMIN — IPRATROPIUM BROMIDE AND ALBUTEROL SULFATE 3 ML: 2.5; .5 SOLUTION RESPIRATORY (INHALATION) at 07:48

## 2020-08-30 RX ADMIN — INSULIN LISPRO 3 UNITS: 100 INJECTION, SOLUTION INTRAVENOUS; SUBCUTANEOUS at 12:15

## 2020-08-30 RX ADMIN — FUROSEMIDE 40 MG: 40 TABLET ORAL at 17:12

## 2020-08-30 RX ADMIN — CHOLECALCIFEROL (VITAMIN D3) 10 MCG (400 UNIT) TABLET 1000 UNITS: at 09:18

## 2020-08-30 RX ADMIN — AMLODIPINE BESYLATE 5 MG: 5 TABLET ORAL at 09:18

## 2020-08-30 RX ADMIN — LACTULOSE 10 G: 20 SOLUTION ORAL at 09:18

## 2020-08-30 RX ADMIN — IPRATROPIUM BROMIDE AND ALBUTEROL SULFATE 3 ML: 2.5; .5 SOLUTION RESPIRATORY (INHALATION) at 12:13

## 2020-08-30 RX ADMIN — METOPROLOL TARTRATE 50 MG: 50 TABLET, FILM COATED ORAL at 20:42

## 2020-08-30 RX ADMIN — SODIUM CHLORIDE, PRESERVATIVE FREE 10 ML: 5 INJECTION INTRAVENOUS at 20:50

## 2020-08-30 RX ADMIN — LACTULOSE 30 G: 20 SOLUTION ORAL at 20:42

## 2020-08-30 RX ADMIN — ASPIRIN 81 MG CHEWABLE TABLET 81 MG: 81 TABLET CHEWABLE at 09:18

## 2020-08-30 RX ADMIN — METOPROLOL TARTRATE 50 MG: 50 TABLET, FILM COATED ORAL at 16:28

## 2020-08-31 LAB
ALBUMIN SERPL-MCNC: 3.7 G/DL (ref 3.5–5.2)
ANION GAP SERPL CALCULATED.3IONS-SCNC: 16 MMOL/L (ref 5–15)
BACTERIA UR QL AUTO: ABNORMAL /HPF
BILIRUB UR QL STRIP: NEGATIVE
BUN SERPL-MCNC: 70 MG/DL (ref 6–20)
BUN/CREAT SERPL: 18.3 (ref 7–25)
CALCIUM SPEC-SCNC: 9.5 MG/DL (ref 8.6–10.5)
CHLORIDE SERPL-SCNC: 97 MMOL/L (ref 98–107)
CLARITY UR: CLEAR
CO2 SERPL-SCNC: 27 MMOL/L (ref 22–29)
COLOR UR: YELLOW
CREAT SERPL-MCNC: 3.83 MG/DL (ref 0.57–1)
CREAT UR-MCNC: 54.7 MG/DL
DEPRECATED RDW RBC AUTO: 52.4 FL (ref 37–54)
ERYTHROCYTE [DISTWIDTH] IN BLOOD BY AUTOMATED COUNT: 14.8 % (ref 12.3–15.4)
GFR SERPL CREATININE-BSD FRML MDRD: 15 ML/MIN/1.73
GLUCOSE BLDC GLUCOMTR-MCNC: 110 MG/DL (ref 70–130)
GLUCOSE BLDC GLUCOMTR-MCNC: 163 MG/DL (ref 70–130)
GLUCOSE BLDC GLUCOMTR-MCNC: 195 MG/DL (ref 70–130)
GLUCOSE BLDC GLUCOMTR-MCNC: 226 MG/DL (ref 70–130)
GLUCOSE BLDC GLUCOMTR-MCNC: 93 MG/DL (ref 70–130)
GLUCOSE SERPL-MCNC: 90 MG/DL (ref 65–99)
GLUCOSE UR STRIP-MCNC: NEGATIVE MG/DL
HCT VFR BLD AUTO: 37.1 % (ref 34–46.6)
HGB BLD-MCNC: 11.6 G/DL (ref 12–15.9)
HGB UR QL STRIP.AUTO: NEGATIVE
HYALINE CASTS UR QL AUTO: ABNORMAL /LPF
KETONES UR QL STRIP: NEGATIVE
LEUKOCYTE ESTERASE UR QL STRIP.AUTO: NEGATIVE
MCH RBC QN AUTO: 29.5 PG (ref 26.6–33)
MCHC RBC AUTO-ENTMCNC: 31.3 G/DL (ref 31.5–35.7)
MCV RBC AUTO: 94.4 FL (ref 79–97)
NITRITE UR QL STRIP: NEGATIVE
PH UR STRIP.AUTO: <=5 [PH] (ref 5–8)
PHOSPHATE SERPL-MCNC: 4.6 MG/DL (ref 2.5–4.5)
PLATELET # BLD AUTO: 198 10*3/MM3 (ref 140–450)
PMV BLD AUTO: 11.7 FL (ref 6–12)
POTASSIUM SERPL-SCNC: 3.4 MMOL/L (ref 3.5–5.2)
PROT UR QL STRIP: ABNORMAL
RBC # BLD AUTO: 3.93 10*6/MM3 (ref 3.77–5.28)
RBC # UR: ABNORMAL /HPF
REF LAB TEST METHOD: ABNORMAL
SODIUM SERPL-SCNC: 140 MMOL/L (ref 136–145)
SP GR UR STRIP: 1.01 (ref 1–1.03)
SQUAMOUS #/AREA URNS HPF: ABNORMAL /HPF
UROBILINOGEN UR QL STRIP: ABNORMAL
WBC # BLD AUTO: 9.27 10*3/MM3 (ref 3.4–10.8)
WBC UR QL AUTO: ABNORMAL /HPF

## 2020-08-31 PROCEDURE — 63710000001 INSULIN LISPRO (HUMAN) PER 5 UNITS: Performed by: HOSPITALIST

## 2020-08-31 PROCEDURE — 85027 COMPLETE CBC AUTOMATED: CPT | Performed by: INTERNAL MEDICINE

## 2020-08-31 PROCEDURE — 94799 UNLISTED PULMONARY SVC/PX: CPT

## 2020-08-31 PROCEDURE — 80069 RENAL FUNCTION PANEL: CPT | Performed by: INTERNAL MEDICINE

## 2020-08-31 PROCEDURE — 99232 SBSQ HOSP IP/OBS MODERATE 35: CPT | Performed by: HOSPITALIST

## 2020-08-31 PROCEDURE — 82962 GLUCOSE BLOOD TEST: CPT

## 2020-08-31 PROCEDURE — 63710000001 INSULIN LISPRO (HUMAN) PER 5 UNITS: Performed by: INTERNAL MEDICINE

## 2020-08-31 PROCEDURE — 63710000001 INSULIN DETEMIR PER 5 UNITS: Performed by: HOSPITALIST

## 2020-08-31 RX ORDER — METOPROLOL TARTRATE 100 MG/1
100 TABLET ORAL EVERY 12 HOURS SCHEDULED
Status: DISCONTINUED | OUTPATIENT
Start: 2020-08-31 | End: 2020-09-04 | Stop reason: HOSPADM

## 2020-08-31 RX ORDER — POTASSIUM CHLORIDE 750 MG/1
40 CAPSULE, EXTENDED RELEASE ORAL AS NEEDED
Status: DISCONTINUED | OUTPATIENT
Start: 2020-08-31 | End: 2020-09-04 | Stop reason: HOSPADM

## 2020-08-31 RX ORDER — POTASSIUM CHLORIDE 1.5 G/1.77G
40 POWDER, FOR SOLUTION ORAL AS NEEDED
Status: DISCONTINUED | OUTPATIENT
Start: 2020-08-31 | End: 2020-09-04 | Stop reason: HOSPADM

## 2020-08-31 RX ADMIN — SODIUM CHLORIDE, PRESERVATIVE FREE 10 ML: 5 INJECTION INTRAVENOUS at 19:47

## 2020-08-31 RX ADMIN — TERAZOSIN HYDROCHLORIDE 5 MG: 5 CAPSULE ORAL at 08:35

## 2020-08-31 RX ADMIN — POTASSIUM CHLORIDE 40 MEQ: 10 CAPSULE, COATED, EXTENDED RELEASE ORAL at 23:56

## 2020-08-31 RX ADMIN — LINAGLIPTIN 5 MG: 5 TABLET, FILM COATED ORAL at 08:34

## 2020-08-31 RX ADMIN — INSULIN LISPRO 5 UNITS: 100 INJECTION, SOLUTION INTRAVENOUS; SUBCUTANEOUS at 11:35

## 2020-08-31 RX ADMIN — HYDRALAZINE HYDROCHLORIDE 100 MG: 50 TABLET, FILM COATED ORAL at 05:06

## 2020-08-31 RX ADMIN — PRAVASTATIN SODIUM 80 MG: 40 TABLET ORAL at 08:38

## 2020-08-31 RX ADMIN — FUROSEMIDE 40 MG: 40 TABLET ORAL at 17:10

## 2020-08-31 RX ADMIN — FAMOTIDINE 20 MG: 20 TABLET, FILM COATED ORAL at 17:10

## 2020-08-31 RX ADMIN — FOLIC ACID 1 MG: 1 TABLET ORAL at 08:38

## 2020-08-31 RX ADMIN — TERAZOSIN HYDROCHLORIDE 5 MG: 5 CAPSULE ORAL at 19:45

## 2020-08-31 RX ADMIN — INSULIN LISPRO 2 UNITS: 100 INJECTION, SOLUTION INTRAVENOUS; SUBCUTANEOUS at 08:40

## 2020-08-31 RX ADMIN — NICOTINE 1 PATCH: 21 PATCH, EXTENDED RELEASE TRANSDERMAL at 08:40

## 2020-08-31 RX ADMIN — IPRATROPIUM BROMIDE AND ALBUTEROL SULFATE 3 ML: 2.5; .5 SOLUTION RESPIRATORY (INHALATION) at 15:40

## 2020-08-31 RX ADMIN — ALPRAZOLAM 1 MG: 0.25 TABLET ORAL at 05:55

## 2020-08-31 RX ADMIN — INSULIN LISPRO 5 UNITS: 100 INJECTION, SOLUTION INTRAVENOUS; SUBCUTANEOUS at 08:39

## 2020-08-31 RX ADMIN — ALPRAZOLAM 1 MG: 0.25 TABLET ORAL at 19:44

## 2020-08-31 RX ADMIN — HYDRALAZINE HYDROCHLORIDE 100 MG: 50 TABLET, FILM COATED ORAL at 19:44

## 2020-08-31 RX ADMIN — FUROSEMIDE 40 MG: 40 TABLET ORAL at 08:35

## 2020-08-31 RX ADMIN — CHOLECALCIFEROL (VITAMIN D3) 10 MCG (400 UNIT) TABLET 1000 UNITS: at 08:35

## 2020-08-31 RX ADMIN — IPRATROPIUM BROMIDE AND ALBUTEROL SULFATE 3 ML: 2.5; .5 SOLUTION RESPIRATORY (INHALATION) at 12:11

## 2020-08-31 RX ADMIN — SODIUM CHLORIDE, PRESERVATIVE FREE 10 ML: 5 INJECTION INTRAVENOUS at 08:33

## 2020-08-31 RX ADMIN — INSULIN DETEMIR 12 UNITS: 100 INJECTION, SOLUTION SUBCUTANEOUS at 08:43

## 2020-08-31 RX ADMIN — APIXABAN 5 MG: 5 TABLET, FILM COATED ORAL at 19:45

## 2020-08-31 RX ADMIN — FAMOTIDINE 20 MG: 20 TABLET, FILM COATED ORAL at 05:06

## 2020-08-31 RX ADMIN — METOPROLOL TARTRATE 50 MG: 50 TABLET, FILM COATED ORAL at 08:38

## 2020-08-31 RX ADMIN — DOCUSATE SODIUM 100 MG: 100 CAPSULE, LIQUID FILLED ORAL at 08:37

## 2020-08-31 RX ADMIN — AMLODIPINE BESYLATE 5 MG: 5 TABLET ORAL at 08:37

## 2020-08-31 RX ADMIN — METOPROLOL TARTRATE 100 MG: 100 TABLET ORAL at 19:45

## 2020-08-31 RX ADMIN — IPRATROPIUM BROMIDE AND ALBUTEROL SULFATE 3 ML: 2.5; .5 SOLUTION RESPIRATORY (INHALATION) at 07:18

## 2020-08-31 RX ADMIN — APIXABAN 5 MG: 5 TABLET, FILM COATED ORAL at 08:37

## 2020-08-31 RX ADMIN — IPRATROPIUM BROMIDE AND ALBUTEROL SULFATE 3 ML: 2.5; .5 SOLUTION RESPIRATORY (INHALATION) at 19:28

## 2020-08-31 RX ADMIN — ASPIRIN 81 MG CHEWABLE TABLET 81 MG: 81 TABLET CHEWABLE at 08:36

## 2020-08-31 RX ADMIN — HYDRALAZINE HYDROCHLORIDE 100 MG: 50 TABLET, FILM COATED ORAL at 13:55

## 2020-09-01 LAB
ALBUMIN SERPL-MCNC: 3.8 G/DL (ref 3.5–5.2)
ANION GAP SERPL CALCULATED.3IONS-SCNC: 14 MMOL/L (ref 5–15)
BUN SERPL-MCNC: 75 MG/DL (ref 6–20)
BUN/CREAT SERPL: 18.2 (ref 7–25)
CALCIUM SPEC-SCNC: 9.5 MG/DL (ref 8.6–10.5)
CHLORIDE SERPL-SCNC: 98 MMOL/L (ref 98–107)
CO2 SERPL-SCNC: 25 MMOL/L (ref 22–29)
CREAT SERPL-MCNC: 4.12 MG/DL (ref 0.57–1)
GFR SERPL CREATININE-BSD FRML MDRD: 14 ML/MIN/1.73
GFR SERPL CREATININE-BSD FRML MDRD: ABNORMAL ML/MIN/{1.73_M2}
GLUCOSE BLDC GLUCOMTR-MCNC: 203 MG/DL (ref 70–130)
GLUCOSE BLDC GLUCOMTR-MCNC: 219 MG/DL (ref 70–130)
GLUCOSE BLDC GLUCOMTR-MCNC: 235 MG/DL (ref 70–130)
GLUCOSE BLDC GLUCOMTR-MCNC: 240 MG/DL (ref 70–130)
GLUCOSE SERPL-MCNC: 236 MG/DL (ref 65–99)
PHOSPHATE SERPL-MCNC: 4.2 MG/DL (ref 2.5–4.5)
POTASSIUM SERPL-SCNC: 4.5 MMOL/L (ref 3.5–5.2)
POTASSIUM SERPL-SCNC: 4.5 MMOL/L (ref 3.5–5.2)
SODIUM SERPL-SCNC: 137 MMOL/L (ref 136–145)

## 2020-09-01 PROCEDURE — 82962 GLUCOSE BLOOD TEST: CPT

## 2020-09-01 PROCEDURE — 63710000001 INSULIN DETEMIR PER 5 UNITS: Performed by: HOSPITALIST

## 2020-09-01 PROCEDURE — 99233 SBSQ HOSP IP/OBS HIGH 50: CPT | Performed by: INTERNAL MEDICINE

## 2020-09-01 PROCEDURE — 94799 UNLISTED PULMONARY SVC/PX: CPT

## 2020-09-01 PROCEDURE — 80069 RENAL FUNCTION PANEL: CPT | Performed by: INTERNAL MEDICINE

## 2020-09-01 RX ORDER — HYDROCODONE BITARTRATE AND ACETAMINOPHEN 7.5; 325 MG/1; MG/1
1 TABLET ORAL 3 TIMES DAILY PRN
Status: DISCONTINUED | OUTPATIENT
Start: 2020-09-01 | End: 2020-09-04 | Stop reason: HOSPADM

## 2020-09-01 RX ADMIN — SODIUM CHLORIDE, PRESERVATIVE FREE 10 ML: 5 INJECTION INTRAVENOUS at 20:07

## 2020-09-01 RX ADMIN — IPRATROPIUM BROMIDE AND ALBUTEROL SULFATE 3 ML: 2.5; .5 SOLUTION RESPIRATORY (INHALATION) at 20:53

## 2020-09-01 RX ADMIN — FAMOTIDINE 20 MG: 20 TABLET, FILM COATED ORAL at 17:54

## 2020-09-01 RX ADMIN — HYDROCODONE BITARTRATE AND ACETAMINOPHEN 1 TABLET: 7.5; 325 TABLET ORAL at 15:06

## 2020-09-01 RX ADMIN — CHOLECALCIFEROL (VITAMIN D3) 10 MCG (400 UNIT) TABLET 1000 UNITS: at 15:06

## 2020-09-01 RX ADMIN — IPRATROPIUM BROMIDE AND ALBUTEROL SULFATE 3 ML: 2.5; .5 SOLUTION RESPIRATORY (INHALATION) at 07:17

## 2020-09-01 RX ADMIN — HYDRALAZINE HYDROCHLORIDE 100 MG: 50 TABLET, FILM COATED ORAL at 15:06

## 2020-09-01 RX ADMIN — ALPRAZOLAM 1 MG: 0.25 TABLET ORAL at 21:47

## 2020-09-01 RX ADMIN — APIXABAN 5 MG: 5 TABLET, FILM COATED ORAL at 08:57

## 2020-09-01 RX ADMIN — TERAZOSIN HYDROCHLORIDE 5 MG: 5 CAPSULE ORAL at 08:56

## 2020-09-01 RX ADMIN — FOLIC ACID 1 MG: 1 TABLET ORAL at 08:57

## 2020-09-01 RX ADMIN — ASPIRIN 81 MG CHEWABLE TABLET 81 MG: 81 TABLET CHEWABLE at 08:57

## 2020-09-01 RX ADMIN — DOCUSATE SODIUM 100 MG: 100 CAPSULE, LIQUID FILLED ORAL at 09:04

## 2020-09-01 RX ADMIN — METOPROLOL TARTRATE 100 MG: 100 TABLET ORAL at 08:57

## 2020-09-01 RX ADMIN — FAMOTIDINE 20 MG: 20 TABLET, FILM COATED ORAL at 08:57

## 2020-09-01 RX ADMIN — POTASSIUM CHLORIDE 40 MEQ: 10 CAPSULE, COATED, EXTENDED RELEASE ORAL at 05:20

## 2020-09-01 RX ADMIN — NICOTINE 1 PATCH: 21 PATCH, EXTENDED RELEASE TRANSDERMAL at 12:34

## 2020-09-01 RX ADMIN — IPRATROPIUM BROMIDE AND ALBUTEROL SULFATE 3 ML: 2.5; .5 SOLUTION RESPIRATORY (INHALATION) at 11:55

## 2020-09-01 RX ADMIN — HYDRALAZINE HYDROCHLORIDE 100 MG: 50 TABLET, FILM COATED ORAL at 05:20

## 2020-09-01 RX ADMIN — ALPRAZOLAM 1 MG: 0.25 TABLET ORAL at 08:55

## 2020-09-01 RX ADMIN — LINAGLIPTIN 5 MG: 5 TABLET, FILM COATED ORAL at 08:57

## 2020-09-01 RX ADMIN — AMLODIPINE BESYLATE 5 MG: 5 TABLET ORAL at 08:57

## 2020-09-01 RX ADMIN — METOPROLOL TARTRATE 100 MG: 100 TABLET ORAL at 20:06

## 2020-09-01 RX ADMIN — IPRATROPIUM BROMIDE AND ALBUTEROL SULFATE 3 ML: 2.5; .5 SOLUTION RESPIRATORY (INHALATION) at 16:19

## 2020-09-01 RX ADMIN — INSULIN DETEMIR 10 UNITS: 100 INJECTION, SOLUTION SUBCUTANEOUS at 09:05

## 2020-09-01 RX ADMIN — TERAZOSIN HYDROCHLORIDE 5 MG: 5 CAPSULE ORAL at 20:06

## 2020-09-01 RX ADMIN — HYDRALAZINE HYDROCHLORIDE 100 MG: 50 TABLET, FILM COATED ORAL at 21:47

## 2020-09-01 RX ADMIN — PRAVASTATIN SODIUM 80 MG: 40 TABLET ORAL at 08:57

## 2020-09-01 RX ADMIN — APIXABAN 5 MG: 5 TABLET, FILM COATED ORAL at 20:06

## 2020-09-01 NOTE — PROGRESS NOTES
[facility]       LOS: 7 days   Patient Care Team:  Daiana Coyne APRN as PCP - General (Nurse Practitioner)        Subjective       Patient Denies:  Cp, palpitations.  Breathing ok.. Complains of some muscle aches otherwise no change      Vital Signs  Temp:  [96.9 °F (36.1 °C)-98 °F (36.7 °C)] 98 °F (36.7 °C)  Heart Rate:  [70-78] 72  Resp:  [18-24] 18  BP: (143-162)/(58-73) 162/73    Intake/Output Summary (Last 24 hours) at 9/1/2020 0933  Last data filed at 8/31/2020 1300  Gross per 24 hour   Intake 120 ml   Output --   Net 120 ml     No intake/output data recorded.    Physical Exam:     General Appearance:    Alert, cooperative, in no acute distress       Neck:   No adenopathy, supple, trachea midline, no thyromegaly, no JVD   Lungs:     Decreased to auscultation,respirations regular, even and                unlabored    Heart:    Regular rhythm and normal rate, normal S1 and S2, no         murmur, no gallop, no rub, no click   Chest Wall:    No abnormalities observed   Abdomen:     Normal bowel sounds, no masses, no organomegaly, soft     nontender, nondistended   Extremities:   Moves all extremities well, + edema, no cyanosis, no           redness   Pulses:   Pulses palpable and equal bilaterally     Results Review:     I reviewed the patient's new clinical results.      WBC WBC   Date/Time Value Ref Range Status   08/31/2020 1140 9.27 3.40 - 10.80 10*3/mm3 Final            HGB Hemoglobin   Date/Time Value Ref Range Status   08/31/2020 1140 11.6 (L) 12.0 - 15.9 g/dL Final           HCT Hematocrit   Date/Time Value Ref Range Status   08/31/2020 1140 37.1 34.0 - 46.6 % Final            Platelets Platelets   Date/Time Value Ref Range Status   08/31/2020 1140 198 140 - 450 10*3/mm3 Final     Sodium  Sodium   Date/Time Value Ref Range Status   08/31/2020 1140 140 136 - 145 mmol/L Final     Potassium  Potassium   Date/Time Value Ref Range Status    08/31/2020 1140 3.4 (L) 3.5 - 5.2 mmol/L Final     Comment:     Slight hemolysis detected by analyzer. Results may be affected.     Chloride  Chloride   Date/Time Value Ref Range Status   08/31/2020 1140 97 (L) 98 - 107 mmol/L Final     BicarbonateNo results found for: PLASMABICARB    BUN BUN   Date/Time Value Ref Range Status   08/31/2020 1140 70 (H) 6 - 20 mg/dL Final      Creatinine Creatinine   Date/Time Value Ref Range Status   08/31/2020 1140 3.83 (H) 0.57 - 1.00 mg/dL Final      Calcium Calcium   Date/Time Value Ref Range Status   08/31/2020 1140 9.5 8.6 - 10.5 mg/dL Final      Mag @RESULFAST(MG:3)@        PT/INR:       Lab Results   Component Value Date    PROTIME 14.9 (H) 08/25/2020    INR 1.20 (H) 08/25/2020      Troponin I:  No results found for: TROPONINI   Lab Results   Component Value Date    TROPONINT 0.092 (C) 08/26/2020         amLODIPine 5 mg Oral Q24H   apixaban 5 mg Oral Q12H   aspirin 81 mg Oral Daily   cholecalciferol 1,000 Units Oral Daily   docusate sodium 100 mg Oral Daily   famotidine 20 mg Oral BID AC   folic acid 1 mg Oral Daily   furosemide 40 mg Oral BID   hydrALAZINE 100 mg Oral Q8H   insulin detemir 10 Units Subcutaneous Q24H   insulin lispro 0-7 Units Subcutaneous TID AC   insulin lispro 5 Units Subcutaneous TID With Meals   ipratropium-albuterol 3 mL Nebulization 4x Daily - RT   linagliptin 5 mg Oral Daily   metoprolol tartrate 100 mg Oral Q12H   nicotine 1 patch Transdermal Q24H   pharmacy consult - MTM  Does not apply Daily   pravastatin 80 mg Oral Daily   sodium chloride 10 mL Intravenous Q12H   terazosin 5 mg Oral Q12H       dilTIAZem 5-15 mg/hr Last Rate: 5 mg/hr (08/29/20 1900)   niCARdipine 5-15 mg/hr Last Rate: Stopped (08/29/20 1112)       Assessment/Plan     Patient Active Problem List   Diagnosis Code   • Atrial fibrillation with RVR (CMS/[facility]) I48.91   • Acute respiratory failure with hypoxia (CMS/[facility]) J96.01   • Pneumonia J18.9   • CHF (congestive heart failure)  (CMS/HCC) I50.9   • HTN (hypertension) I10   • Anxiety F41.9   • MILKA (acute kidney injury) (CMS/HCC) N17.9   • CKD (chronic kidney disease) stage 4, GFR 15-29 ml/min (CMS/HCC) N18.4   • Macrocytic anemia D53.9   • Renal artery stenosis s/p stent I70.1   • COPD (chronic obstructive pulmonary disease) (CMS/HCC) J44.9   • CAD (coronary artery disease) s/p stent I25.10   • Tobacco abuse Z72.0   • Type 2 diabetes mellitus (CMS/HCC) E11.9     Hypertension   Normal EF  Paroxysmal atrial fibrillation maintaining sinus rhythm  Chronic kidney disease; increase in creatinine yesterday  COPD      Continue anticoagulation  Renal following  Increase beta-blocker  Hopefully home soon when renal function stabilizes  Okay from cardiology standpoint      Rah Atkins MD  09/01/20  09:33

## 2020-09-01 NOTE — PROGRESS NOTES
"    [facility] Medicine Services  PROGRESS NOTE    Patient Name: Johanne Lucas  : 1965  MRN: [id number]    Date of Admission: 2020  Primary Care Physician: Daiana Coyne APRN    Subjective   Subjective     CC: Shortness of breath    HPI:   Breathing \"fine\". No chest pain. No bleeding. No fever. No chest pain    Review of Systems    No headache, no rash    Objective   Objective     Vital Signs:   Temp:  [96.9 °F (36.1 °C)-98 °F (36.7 °C)] 97.8 °F (36.6 °C)  Heart Rate:  [68-78] 68  Resp:  [18-24] 18  BP: (138-162)/(58-73) 138/64     Physical Exam:    Constitutional: No acute distress, awake, alert, nontoxic appearing  HENT: NCAT, neck supple  Respiratory: Clear to auscultation bilaterally, respiratory effort normal   Cardiovascular: RRR, s1 and s2  Gastrointestinal: Positive bowel sounds, soft, nontender, nondistended  Musculoskeletal: No bilateral ankle edema  Psychiatric: Appropriate affect, cooperative  Neurologic: Oriented x 3, strength symmetric in all extremities, Cranial Nerves grossly intact to confrontation, speech clear  Skin: No rashes    Results Reviewed:  Results from last 7 days   Lab Units 20  1140 20  0344 20  0531 20  2253 20  2118   WBC 10*3/mm3 9.27 11.20* 8.93  --  10.25   HEMOGLOBIN g/dL 11.6* 10.3* 10.9*  --  11.1*   HEMATOCRIT % 37.1 33.5* 35.8  --  36.7   PLATELETS 10*3/mm3 198 184 177  --  199   INR   --   --   --   --  1.20*   PROCALCITONIN ng/mL  --   --   --  0.14  --      Results from last 7 days   Lab Units 20  1014 20  1140 20  0752  20  0854 20  0531 20  2253   SODIUM mmol/L 137 140 137   < >  --  138 138   POTASSIUM mmol/L 4.5  4.5 3.4* 3.2*   < >  --  5.3* 4.5   CHLORIDE mmol/L 98 97* 97*   < >  --  105 106   CO2 mmol/L 25.0 27.0 28.0   < >  --  21.0* 21.0*   BUN mg/dL 75* 70* 65*   < >  --  47* 45*   CREATININE mg/dL 4.12* 3.83* 2.82*   < >  --  3.04* 2.96*   GLUCOSE mg/dL " 236* 90 161*   < >  --  349* 266*   CALCIUM mg/dL 9.5 9.5 9.7   < >  --  9.2 9.8   ALT (SGPT) U/L  --   --   --   --   --   --  15   AST (SGOT) U/L  --   --   --   --   --   --  23   TROPONIN T ng/mL  --   --   --   --  0.092* 0.058* <0.010   PROBNP pg/mL  --   --   --   --   --   --  2,842.0*    < > = values in this interval not displayed.     Estimated Creatinine Clearance: 20.3 mL/min (A) (by C-G formula based on SCr of 4.12 mg/dL (H)).    Microbiology Results Abnormal     Procedure Component Value - Date/Time    Blood Culture - Blood, Arm, Left [[id number]] Collected:  08/25/20 2118    Lab Status:  Final result Specimen:  Blood from Arm, Left Updated:  08/30/20 2145     Blood Culture No growth at 5 days    Blood Culture - Blood, Arm, Right [[id number]] Collected:  08/25/20 2118    Lab Status:  Final result Specimen:  Blood from Arm, Right Updated:  08/30/20 2145     Blood Culture No growth at 5 days    S. Pneumo Ag Urine or CSF - Urine, Urine, Clean Catch [[id number]]  (Normal) Collected:  08/26/20 0405    Lab Status:  Final result Specimen:  Urine, Clean Catch Updated:  08/26/20 0911     Strep Pneumo Ag Negative    Legionella Antigen, Urine - Urine, Urine, Clean Catch [[id number]]  (Normal) Collected:  08/26/20 0405    Lab Status:  Final result Specimen:  Urine, Clean Catch Updated:  08/26/20 0910     LEGIONELLA ANTIGEN, URINE Negative    Respiratory Culture - Sputum, Cough [[id number]] Collected:  08/26/20 0700    Lab Status:  Final result Specimen:  Sputum from Cough Updated:  08/26/20 0852     Respiratory Culture Rejected     Gram Stain Many (4+) Epithelial cells per low power field      Rare (1+) WBCs per low power field      Moderate (3+) Mixed bacterial morphotypes seen on Gram Stain    Narrative:       Specimen rejected due to oropharyngeal contamination.    Respiratory Panel PCR w/COVID-19(SARS-CoV-2) [facility] In-House, NP Swab in UTM/VTM, 3-4 HR TAT - Swab, Nasopharynx [[id number]]  (Normal)  Collected:  08/26/20 0131    Lab Status:  Final result Specimen:  Swab from Nasopharynx Updated:  08/26/20 0309     ADENOVIRUS, PCR Not Detected     Coronavirus 229E Not Detected     Coronavirus HKU1 Not Detected     Coronavirus NL63 Not Detected     Coronavirus OC43 Not Detected     COVID19 Not Detected     Human Metapneumovirus Not Detected     Human Rhinovirus/Enterovirus Not Detected     Influenza A PCR Not Detected     Influenza A H1 Not Detected     Influenza A H1 2009 PCR Not Detected     Influenza A H3 Not Detected     Influenza B PCR Not Detected     Parainfluenza Virus 1 Not Detected     Parainfluenza Virus 2 Not Detected     Parainfluenza Virus 3 Not Detected     Parainfluenza Virus 4 Not Detected     RSV, PCR Not Detected     Bordetella pertussis pcr Not Detected     Bordetella parapertussis PCR Not Detected     Chlamydophila pneumoniae PCR Not Detected     Mycoplasma pneumo by PCR Not Detected    Narrative:       Fact sheet for providers: https://docs.[employer]/wp-content/uploads/EXY0177-7065-LD1.1-EUA-Provider-Fact-Sheet-3.pdf    Fact sheet for patients: https://docs.[employer]/wp-content/uploads/CZX1083-1726-KP0.1-EUA-Patient-Fact-Sheet-1.pdf        Imaging Results (Last 24 Hours)     ** No results found for the last 24 hours. **        Results for orders placed during the hospital encounter of 08/25/20   Transthoracic Echo Complete With Contrast if Necessary Per Protocol    Narrative · Left ventricular wall thickness is consistent with mild-to-moderate   concentric hypertrophy.  · Left ventricular systolic function is normal.  · Left ventricular diastolic dysfunction.  · Right ventricular cavity is moderately dilated.  · Left atrial cavity size is mildly dilated.  · Mild mitral valve regurgitation is present  · Mild tricuspid valve regurgitation is present.  · Calculated right ventricular systolic pressure from tricuspid   regurgitation is 31 mmHg.  · Mild pulmonic valve regurgitation is  present.  · The aortic valve has a small non mobile echogenic thickening of the L   coronary cusp. This could represent a healed vegetation.        I have reviewed the medications:  Scheduled Meds:    amLODIPine 5 mg Oral Q24H   apixaban 5 mg Oral Q12H   aspirin 81 mg Oral Daily   cholecalciferol 1,000 Units Oral Daily   docusate sodium 100 mg Oral Daily   famotidine 20 mg Oral BID AC   folic acid 1 mg Oral Daily   hydrALAZINE 100 mg Oral Q8H   insulin detemir 10 Units Subcutaneous Q24H   insulin lispro 0-7 Units Subcutaneous TID AC   insulin lispro 5 Units Subcutaneous TID With Meals   ipratropium-albuterol 3 mL Nebulization 4x Daily - RT   metoprolol tartrate 100 mg Oral Q12H   nicotine 1 patch Transdermal Q24H   pharmacy consult - MTM  Does not apply Daily   pravastatin 80 mg Oral Daily   sodium chloride 10 mL Intravenous Q12H   terazosin 5 mg Oral Q12H     Continuous Infusions:    dilTIAZem 5-15 mg/hr Last Rate: 5 mg/hr (08/29/20 1900)   niCARdipine 5-15 mg/hr Last Rate: Stopped (08/29/20 1112)     PRN Meds:.•  acetaminophen **OR** acetaminophen **OR** acetaminophen  •  ALPRAZolam  •  dextrose  •  dextrose  •  glucagon (human recombinant)  •  ipratropium-albuterol  •  melatonin  •  ondansetron  •  potassium chloride  •  potassium chloride  •  sodium chloride    Assessment/Plan   Assessment & Plan     Active Hospital Problems    Diagnosis  POA   • **Atrial fibrillation with RVR (CMS/[facility]) [I48.91]  Yes   • Acute respiratory failure with hypoxia (CMS/[facility]) [J96.01]  Unknown   • Pneumonia [J18.9]  Unknown   • CHF (congestive heart failure) (CMS/[facility]) [I50.9]  Unknown   • HTN (hypertension) [I10]  Unknown   • Anxiety [F41.9]  Unknown   • MILKA (acute kidney injury) (CMS/[facility]) [N17.9]  Unknown   • CKD (chronic kidney disease) stage 4, GFR 15-29 ml/min (CMS/[facility]) [N18.4]  Unknown   • Macrocytic anemia [D53.9]  Unknown   • Renal artery stenosis s/p stent [I70.1]  Unknown   • COPD (chronic obstructive pulmonary disease)  (CMS/[facility]) [J44.9]  Unknown   • CAD (coronary artery disease) s/p stent [I25.10]  Unknown   • Tobacco abuse [Z72.0]  Unknown   • Type 2 diabetes mellitus (CMS/[facility]) [E11.9]  Unknown      Resolved Hospital Problems   No resolved problems to display.        Brief Hospital Course to date:  Johanne Lucas is a 55 y.o. female past medical history of CHF, atrial fibrillation on Eliquis, diabetes, coronary artery disease with stent, COPD, tobacco use, ckd 4 who presented to the ER with acute onset of palpitations, shortness of breath, diaphoresis and chest pain.  The patient states that after waking up from a nap she became short of breath with racing heart and chest pain that radiated into her neck    Adjusting medication for hypertension and renal dysfunction.  Had to de-escalate insulin therapy and bowel regimen.  She says she takes Lactulose at home, but now we are holding    This patient's problems and plans were partially entered by my partner and updated as appropriate by me 09/01/20. Today is my first day evaluating this patients active medical problems. I Personally reviewed chart and adjusted note to reflect daily changes in management/clinical condition          Acute Hypoxic resp failure    Bilateral pulm infiltrates, favor DHF  -completed empiric antibiotics  -s/p diuresis  -Continue with antimicrobial coverage  -Wean oxygen as tolerated  -Elevated BNP - 2,842    Acute on chronic DHF  -s/p diuresis  -stopping lasix 9/1/20    Aortic valve thickening on echo  -per cards    Parox Afib, currently in sinus rhythm  -on eliquis    Acute on Chronic kidney disease 4  -Presumed diabetic neuropathy  -Has had dialysis in the past  -Hyperkalemia  -stopping lasix 9/1/20    Diabetes  -Uncontrolled diabetes with hemoglobin A1c 9.2  -Adjust medications as needed  -stop tradjenta    Hypertension  - Norvasc  - Hydralazine 100 mg every 8 hours  - metoprolol 50 mg TID  - terazosin 5 mg every 12  hours    Obesity    --------------------------------------------------------    Plan:  -stop lasix, other nephrotoxic meds (cr from 2.8--> 3.8 yesterday afternoon--> 4.1 today). Nephrology informed, to follow    -room air sats ok while awake currently, likely need night oxygen    -continue rate control per cards; continue eliquis for now (d/w pharmacy) for afib    Am labs: cbc,renal panel    DVT Prophylaxis: Apixaban    Disposition: I expect the patient to be discharged when renal function stable & ok w/ consultants    CODE STATUS:   Code Status and Medical Interventions:   Ordered at: 08/26/20 0055     Level Of Support Discussed With:    Patient     Code Status:    CPR     Medical Interventions (Level of Support Prior to Arrest):    Full         Electronically signed by Dequan Mae MD, 09/01/20, 11:52.

## 2020-09-01 NOTE — PLAN OF CARE
Pt VSS, no complaints of pain, seems to have rested well this shift. Pt looking forward to possible D/C today. Will cont to  monitor.

## 2020-09-01 NOTE — PROGRESS NOTES
"   LOS: 7 days    Patient Care Team:  Daiana Coyne APRN as PCP - General (Nurse Practitioner)    Chief Complaint: MILKA on CKD stage III and IV    Subjective   Interval History:   No new events no new complaints  Review of Systems:   Denies N/V, CP or SOA    Objective     Vital Sign Min/Max for last 24 hours  Temp  Min: 96.9 °F (36.1 °C)  Max: 98 °F (36.7 °C)   BP  Min: 143/58  Max: 162/73   Pulse  Min: 70  Max: 78   Resp  Min: 18  Max: 24   SpO2  Min: 89 %  Max: 96 %   No data recorded   Weight  Min: 114 kg (251 lb 1.6 oz)  Max: 114 kg (251 lb 1.6 oz)     Flowsheet Rows      First Filed Value   Admission Height  165.1 cm (65\") Documented at 08/25/2020 2110   Admission Weight  113 kg (250 lb) Documented at 08/25/2020 2110          No intake/output data recorded.  I/O last 3 completed shifts:  In: 460 [P.O.:360; IV Piggyback:100]  Out: 500 [Urine:500]    Physical Exam:  General Appearance: Alert oriented x3 , NAD  Eyes: normal conjunctiva, EOMI.  Neck: Supple no JVD.  Lungs: Clear auscultation,  nonlabored.  Heart: No gallop, murmur, rub, RRR.  Abdomen: Soft, nontender, positive bowel sounds, ND  Extremities: No edema, no cyanosis.  Neuro: No focal deficit, moving all extremities, alert oriented X 3  AV fistula right upper arm.  Functional    WBC WBC   Date Value Ref Range Status   08/31/2020 9.27 3.40 - 10.80 10*3/mm3 Final      HGB Hemoglobin   Date Value Ref Range Status   08/31/2020 11.6 (L) 12.0 - 15.9 g/dL Final      HCT Hematocrit   Date Value Ref Range Status   08/31/2020 37.1 34.0 - 46.6 % Final      Platlets No results found for: LABPLAT   MCV MCV   Date Value Ref Range Status   08/31/2020 94.4 79.0 - 97.0 fL Final          Sodium Sodium   Date Value Ref Range Status   09/01/2020 137 136 - 145 mmol/L Final   08/31/2020 140 136 - 145 mmol/L Final      Potassium Potassium   Date Value Ref Range Status   09/01/2020 4.5 3.5 - 5.2 mmol/L Final   09/01/2020 4.5 3.5 - 5.2 mmol/L Final   08/31/2020 3.4 (L) 3.5 " - 5.2 mmol/L Final     Comment:     Slight hemolysis detected by analyzer. Results may be affected.      Chloride Chloride   Date Value Ref Range Status   09/01/2020 98 98 - 107 mmol/L Final   08/31/2020 97 (L) 98 - 107 mmol/L Final      CO2 CO2   Date Value Ref Range Status   09/01/2020 25.0 22.0 - 29.0 mmol/L Final   08/31/2020 27.0 22.0 - 29.0 mmol/L Final      BUN BUN   Date Value Ref Range Status   09/01/2020 75 (H) 6 - 20 mg/dL Final   08/31/2020 70 (H) 6 - 20 mg/dL Final      Creatinine Creatinine   Date Value Ref Range Status   09/01/2020 4.12 (H) 0.57 - 1.00 mg/dL Final   08/31/2020 3.83 (H) 0.57 - 1.00 mg/dL Final      Calcium Calcium   Date Value Ref Range Status   09/01/2020 9.5 8.6 - 10.5 mg/dL Final   08/31/2020 9.5 8.6 - 10.5 mg/dL Final      PO4 No results found for: CAPO4   Albumin Albumin   Date Value Ref Range Status   09/01/2020 3.80 3.50 - 5.20 g/dL Final   08/31/2020 3.70 3.50 - 5.20 g/dL Final      Magnesium No results found for: MG   Uric Acid No results found for: URICACID        Results Review:     I reviewed the patient's new clinical results.      amLODIPine 5 mg Oral Q24H   apixaban 5 mg Oral Q12H   aspirin 81 mg Oral Daily   cholecalciferol 1,000 Units Oral Daily   docusate sodium 100 mg Oral Daily   famotidine 20 mg Oral BID AC   folic acid 1 mg Oral Daily   furosemide 40 mg Oral BID   hydrALAZINE 100 mg Oral Q8H   insulin detemir 10 Units Subcutaneous Q24H   insulin lispro 0-7 Units Subcutaneous TID AC   insulin lispro 5 Units Subcutaneous TID With Meals   ipratropium-albuterol 3 mL Nebulization 4x Daily - RT   linagliptin 5 mg Oral Daily   metoprolol tartrate 100 mg Oral Q12H   nicotine 1 patch Transdermal Q24H   pharmacy consult - MTM  Does not apply Daily   pravastatin 80 mg Oral Daily   sodium chloride 10 mL Intravenous Q12H   terazosin 5 mg Oral Q12H       dilTIAZem 5-15 mg/hr Last Rate: 5 mg/hr (08/29/20 1900)   niCARdipine 5-15 mg/hr Last Rate: Stopped (08/29/20 1112)        Medication Review: Reviewed    Assessment/Plan    1.  MILKA on CKD stage IV- Worsening. Secondary to diuresis.   2.  CKD stage IV - Hx of CHELITA s/p stent in 2009, Atrophic right kidney and DN. On dialysis in the past.  No renal artery stenosis on the left per duplex scan.  Left kidney 11.4 cm.  3.  Met acidosis: Resolved   4.  Afib w RVR: Stable   5.  Volume status:  Improved   6   DM  7.  Anemia  8.  Hypertension   9- Proteinuria - UPCR 1.1 gm.     Plan:  - Hold diuretics for now.   - Monitor I/o   - Avoid nephrotoxic agents  - Adjust meds per renal function   - No need of RRT.     Juliane Rodríguez MD  09/01/20  11:32

## 2020-09-01 NOTE — PROGRESS NOTES
[facility] Medicine Services  PROGRESS NOTE    Patient Name: Johanne Lucas  : 1965  MRN: [id number]    Date of Admission: 2020  Primary Care Physician: Daiana Coyne APRN    Subjective   Subjective     CC: Shortness of breath    HPI:   Moving her bowels.  Leg cramps.  Sleeping a lot.  Holding mealtime insulin and coverage today due to concerns from patient about low blood glucose    Review of Systems    Gen- No fevers, chills  CV- No chest pain, palpitations  Resp- No cough, dyspnea  GI- No N/V/D, abd pain    Objective   Objective     Vital Signs:   Temp:  [97.2 °F (36.2 °C)-98.5 °F (36.9 °C)] 97.8 °F (36.6 °C)  Heart Rate:  [67-78] 77  Resp:  [16-24] 18  BP: (144-165)/(55-75) 159/58     Physical Exam:    Constitutional: No acute distress, awake, alert  HENT: NCAT, dry tongue  Respiratory: Clear to auscultation bilaterally, respiratory effort normal   Cardiovascular: RRR, s1 and s2  Gastrointestinal: Positive bowel sounds, soft, nontender, nondistended  Musculoskeletal: No bilateral ankle edema  Psychiatric: Appropriate affect, cooperative  Neurologic: Oriented x 3, strength symmetric in all extremities, Cranial Nerves grossly intact to confrontation, speech clear  Skin: No rashes    Results Reviewed:  Results from last 7 days   Lab Units 20  1140 20  0344 20  0531 20  2253 20  2118   WBC 10*3/mm3 9.27 11.20* 8.93  --  10.25   HEMOGLOBIN g/dL 11.6* 10.3* 10.9*  --  11.1*   HEMATOCRIT % 37.1 33.5* 35.8  --  36.7   PLATELETS 10*3/mm3 198 184 177  --  199   INR   --   --   --   --  1.20*   PROCALCITONIN ng/mL  --   --   --  0.14  --      Results from last 7 days   Lab Units 20  1140 20  0752 20  0344 20  0854 20  0531 20  2253   SODIUM mmol/L 140 137 136  --  138 138   POTASSIUM mmol/L 3.4* 3.2* 4.4  --  5.3* 4.5   CHLORIDE mmol/L 97* 97* 101  --  105 106   CO2 mmol/L 27.0 28.0 23.0  --  21.0* 21.0*   BUN  mg/dL 70* 65* 53*  --  47* 45*   CREATININE mg/dL 3.83* 2.82* 2.80*  --  3.04* 2.96*   GLUCOSE mg/dL 90 161* 268*  --  349* 266*   CALCIUM mg/dL 9.5 9.7 9.9  --  9.2 9.8   ALT (SGPT) U/L  --   --   --   --   --  15   AST (SGOT) U/L  --   --   --   --   --  23   TROPONIN T ng/mL  --   --   --  0.092* 0.058* <0.010   PROBNP pg/mL  --   --   --   --   --  2,842.0*     Estimated Creatinine Clearance: 21.8 mL/min (A) (by C-G formula based on SCr of 3.83 mg/dL (H)).    Microbiology Results Abnormal     Procedure Component Value - Date/Time    Blood Culture - Blood, Arm, Left [[id number]] Collected:  08/25/20 2118    Lab Status:  Final result Specimen:  Blood from Arm, Left Updated:  08/30/20 2145     Blood Culture No growth at 5 days    Blood Culture - Blood, Arm, Right [[id number]] Collected:  08/25/20 2118    Lab Status:  Final result Specimen:  Blood from Arm, Right Updated:  08/30/20 2145     Blood Culture No growth at 5 days    S. Pneumo Ag Urine or CSF - Urine, Urine, Clean Catch [[id number]]  (Normal) Collected:  08/26/20 0405    Lab Status:  Final result Specimen:  Urine, Clean Catch Updated:  08/26/20 0911     Strep Pneumo Ag Negative    Legionella Antigen, Urine - Urine, Urine, Clean Catch [[id number]]  (Normal) Collected:  08/26/20 0405    Lab Status:  Final result Specimen:  Urine, Clean Catch Updated:  08/26/20 0910     LEGIONELLA ANTIGEN, URINE Negative    Respiratory Culture - Sputum, Cough [[id number]] Collected:  08/26/20 0700    Lab Status:  Final result Specimen:  Sputum from Cough Updated:  08/26/20 0852     Respiratory Culture Rejected     Gram Stain Many (4+) Epithelial cells per low power field      Rare (1+) WBCs per low power field      Moderate (3+) Mixed bacterial morphotypes seen on Gram Stain    Narrative:       Specimen rejected due to oropharyngeal contamination.    Respiratory Panel PCR w/COVID-19(SARS-CoV-2) [facility] In-House, NP Swab in UTM/VTM, 3-4 HR TAT - Swab, Nasopharynx  [[id number]]  (Normal) Collected:  08/26/20 0131    Lab Status:  Final result Specimen:  Swab from Nasopharynx Updated:  08/26/20 0309     ADENOVIRUS, PCR Not Detected     Coronavirus 229E Not Detected     Coronavirus HKU1 Not Detected     Coronavirus NL63 Not Detected     Coronavirus OC43 Not Detected     COVID19 Not Detected     Human Metapneumovirus Not Detected     Human Rhinovirus/Enterovirus Not Detected     Influenza A PCR Not Detected     Influenza A H1 Not Detected     Influenza A H1 2009 PCR Not Detected     Influenza A H3 Not Detected     Influenza B PCR Not Detected     Parainfluenza Virus 1 Not Detected     Parainfluenza Virus 2 Not Detected     Parainfluenza Virus 3 Not Detected     Parainfluenza Virus 4 Not Detected     RSV, PCR Not Detected     Bordetella pertussis pcr Not Detected     Bordetella parapertussis PCR Not Detected     Chlamydophila pneumoniae PCR Not Detected     Mycoplasma pneumo by PCR Not Detected    Narrative:       Fact sheet for providers: https://docs.[employer]/wp-content/uploads/KZZ2316-3327-MX5.1-EUA-Provider-Fact-Sheet-3.pdf    Fact sheet for patients: https://docs.[employer]/wp-content/uploads/YVN6396-1436-DL7.1-EUA-Patient-Fact-Sheet-1.pdf        Imaging Results (Last 24 Hours)     ** No results found for the last 24 hours. **        Results for orders placed during the hospital encounter of 08/25/20   Transthoracic Echo Complete With Contrast if Necessary Per Protocol    Narrative · Left ventricular wall thickness is consistent with mild-to-moderate   concentric hypertrophy.  · Left ventricular systolic function is normal.  · Left ventricular diastolic dysfunction.  · Right ventricular cavity is moderately dilated.  · Left atrial cavity size is mildly dilated.  · Mild mitral valve regurgitation is present  · Mild tricuspid valve regurgitation is present.  · Calculated right ventricular systolic pressure from tricuspid   regurgitation is 31 mmHg.  · Mild pulmonic valve  regurgitation is present.  · The aortic valve has a small non mobile echogenic thickening of the L   coronary cusp. This could represent a healed vegetation.        I have reviewed the medications:  Scheduled Meds:    amLODIPine 5 mg Oral Q24H   apixaban 5 mg Oral Q12H   aspirin 81 mg Oral Daily   cholecalciferol 1,000 Units Oral Daily   docusate sodium 100 mg Oral Daily   famotidine 20 mg Oral BID AC   folic acid 1 mg Oral Daily   furosemide 40 mg Oral BID   hydrALAZINE 100 mg Oral Q8H   insulin detemir 12 Units Subcutaneous Q24H   insulin lispro 0-7 Units Subcutaneous TID AC   insulin lispro 5 Units Subcutaneous TID With Meals   ipratropium-albuterol 3 mL Nebulization 4x Daily - RT   lactulose 30 g Oral TID   linagliptin 5 mg Oral Daily   metoprolol tartrate 100 mg Oral Q12H   nicotine 1 patch Transdermal Q24H   pharmacy consult - MTM  Does not apply Daily   pravastatin 80 mg Oral Daily   sodium chloride 10 mL Intravenous Q12H   terazosin 5 mg Oral Q12H     Continuous Infusions:    dilTIAZem 5-15 mg/hr Last Rate: 5 mg/hr (08/29/20 1900)   niCARdipine 5-15 mg/hr Last Rate: Stopped (08/29/20 1112)     PRN Meds:.•  acetaminophen **OR** acetaminophen **OR** acetaminophen  •  ALPRAZolam  •  dextrose  •  dextrose  •  glucagon (human recombinant)  •  ipratropium-albuterol  •  melatonin  •  ondansetron  •  potassium chloride  •  potassium chloride  •  sodium chloride    Assessment/Plan   Assessment & Plan     Active Hospital Problems    Diagnosis  POA   • **Atrial fibrillation with RVR (CMS/[facility]) [I48.91]  Yes   • Acute respiratory failure with hypoxia (CMS/[facility]) [J96.01]  Unknown   • Pneumonia [J18.9]  Unknown   • CHF (congestive heart failure) (CMS/[facility]) [I50.9]  Unknown   • HTN (hypertension) [I10]  Unknown   • Anxiety [F41.9]  Unknown   • MILKA (acute kidney injury) (CMS/[facility]) [N17.9]  Unknown   • CKD (chronic kidney disease) stage 4, GFR 15-29 ml/min (CMS/[facility]) [N18.4]  Unknown   • Macrocytic anemia [D53.9]  Unknown   •  Renal artery stenosis s/p stent [I70.1]  Unknown   • COPD (chronic obstructive pulmonary disease) (CMS/[facility]) [J44.9]  Unknown   • CAD (coronary artery disease) s/p stent [I25.10]  Unknown   • Tobacco abuse [Z72.0]  Unknown   • Type 2 diabetes mellitus (CMS/[facility]) [E11.9]  Unknown      Resolved Hospital Problems   No resolved problems to display.        Brief Hospital Course to date:  Johanne Lucas is a 55 y.o. female past medical history of CHF, atrial fibrillation on Eliquis, diabetes, coronary artery disease with stent, COPD, tobacco use disorder who presents to the ER with acute onset of palpitations, shortness of breath, diaphoresis and chest pain.  The patient states that after waking up from a nap she became short of breath with racing heart and chest pain that radiated into her neck    Adjusting medication for hypertension and renal dysfunction.  Had to de-escalate insulin therapy and bowel regimen.  She says she takes Lactulose at home, but now we are holding    Possible multifocal pneumonia  -Continue with antimicrobial coverage  -Wean oxygen as tolerated  -Elevated BNP - 2,842    Chronic kidney disease  -Presumed diabetic neuropathy  -Has had dialysis in the past  -Hyperkalemia  -lasix 40 mg BID    Metabolic acidosis  -Due to CKD 4    Diabetes  -Uncontrolled diabetes with hemoglobin A1c 9.2  -Adjust medications as needed  -Diabetes improving    Aortic Valve Abnormality  - per cardiology    Hypertension  - Norvasc  - Lasix  - Hydralazine 100 mg every 8 hours  - metoprolol 50 mg TID  - terazosin 5 mg every 12 hours  5 agents currently    Stop Lactulose  Decrease Insulin today  Supplement potassium  Decrease Long Acting Insulin - appears to be on much less insulin than at home    DVT Prophylaxis: Apixaban    Disposition: I expect the patient to be discharged on Tuesday    CODE STATUS:   Code Status and Medical Interventions:   Ordered at: 08/26/20 0055     Level Of Support Discussed With:    Patient     Code  Status:    CPR     Medical Interventions (Level of Support Prior to Arrest):    Full         Electronically signed by Dean Spence MD, 08/31/20, 21:27.

## 2020-09-01 NOTE — PROGRESS NOTES
Adult Nutrition  Assessment/PES    Patient Name:  Johanne Lucas  YOB: 1965  MRN: [id number]  Admit Date:  8/25/2020    Assessment Date:  9/1/2020      Reason for Assessment     Row Name 09/01/20 1139 09/01/20 1131       Reason for Assessment    Reason For Assessment  -- LOS; 15 mins  --    Diagnosis  --  -- PNA, Afib, CKD, DM, HTN, HF, COPD, CAD, TOB.          Anthropometrics     Row Name 09/01/20 1135 09/01/20 0600                    Admit Weight    Admit Weight  -- ht=67in, fz=423ea; BMI=38.9  --        Labs/Tests/Procedures/Meds     Row Name 09/01/20 1135          Labs/Procedures/Meds    Lab Results Reviewed  reviewed             Nutrition Prescription Ordered     Row Name 09/01/20 1137          Nutrition Prescription PO    Common Modifiers  Cardiac;Consistent Carbohydrate;Renal         Evaluation of Received Nutrient/Fluid Intake     Row Name 09/01/20 1137          PO Evaluation    Number of Meals  3     % PO Intake  58               Problem/Interventions:  Problem 1     Row Name 09/01/20 1137          Nutrition Diagnoses Problem 1    Problem 1  Inadequate Intake/Infusion     Etiology (related to)  MNT for Treatment/Condition     Signs/Symptoms (evidenced by)  PO Intake     Percent (%) intake recorded  58 %     Over number of meals  3               Intervention Goal     Row Name 09/01/20 1137          Intervention Goal    PO  Increase intake         Nutrition Intervention     Row Name 09/01/20 1137          Nutrition Intervention    RD/Tech Action  Follow Tx progress;Supplement provided         Nutrition Prescription     Row Name 09/01/20 1137          Nutrition Prescription PO    PO Prescription  -- will send 1 nepro/d.     New PO Prescription Ordered?  Yes         Education/Evaluation     Row Name 09/01/20 1137          Monitor/Evaluation    Monitor  Per protocol           Electronically signed by:  Lakisha Alberto, MS,RD,LD  09/01/20 11:40

## 2020-09-02 LAB
ALBUMIN SERPL-MCNC: 3.5 G/DL (ref 3.5–5.2)
ANION GAP SERPL CALCULATED.3IONS-SCNC: 11 MMOL/L (ref 5–15)
BUN SERPL-MCNC: 78 MG/DL (ref 6–20)
BUN/CREAT SERPL: 19 (ref 7–25)
CALCIUM SPEC-SCNC: 9.3 MG/DL (ref 8.6–10.5)
CHLORIDE SERPL-SCNC: 101 MMOL/L (ref 98–107)
CO2 SERPL-SCNC: 25 MMOL/L (ref 22–29)
CREAT SERPL-MCNC: 4.11 MG/DL (ref 0.57–1)
DEPRECATED RDW RBC AUTO: 52.4 FL (ref 37–54)
ERYTHROCYTE [DISTWIDTH] IN BLOOD BY AUTOMATED COUNT: 14.9 % (ref 12.3–15.4)
GFR SERPL CREATININE-BSD FRML MDRD: 14 ML/MIN/1.73
GFR SERPL CREATININE-BSD FRML MDRD: ABNORMAL ML/MIN/{1.73_M2}
GLUCOSE BLDC GLUCOMTR-MCNC: 149 MG/DL (ref 70–130)
GLUCOSE BLDC GLUCOMTR-MCNC: 165 MG/DL (ref 70–130)
GLUCOSE BLDC GLUCOMTR-MCNC: 195 MG/DL (ref 70–130)
GLUCOSE BLDC GLUCOMTR-MCNC: 259 MG/DL (ref 70–130)
GLUCOSE SERPL-MCNC: 187 MG/DL (ref 65–99)
HCT VFR BLD AUTO: 35.9 % (ref 34–46.6)
HGB BLD-MCNC: 11.5 G/DL (ref 12–15.9)
MCH RBC QN AUTO: 30.9 PG (ref 26.6–33)
MCHC RBC AUTO-ENTMCNC: 32 G/DL (ref 31.5–35.7)
MCV RBC AUTO: 96.5 FL (ref 79–97)
PHOSPHATE SERPL-MCNC: 4.5 MG/DL (ref 2.5–4.5)
PLATELET # BLD AUTO: 188 10*3/MM3 (ref 140–450)
PMV BLD AUTO: 11 FL (ref 6–12)
POTASSIUM SERPL-SCNC: 4.5 MMOL/L (ref 3.5–5.2)
RBC # BLD AUTO: 3.72 10*6/MM3 (ref 3.77–5.28)
SODIUM SERPL-SCNC: 137 MMOL/L (ref 136–145)
WBC # BLD AUTO: 8.52 10*3/MM3 (ref 3.4–10.8)

## 2020-09-02 PROCEDURE — 85027 COMPLETE CBC AUTOMATED: CPT | Performed by: INTERNAL MEDICINE

## 2020-09-02 PROCEDURE — 63710000001 INSULIN LISPRO (HUMAN) PER 5 UNITS: Performed by: INTERNAL MEDICINE

## 2020-09-02 PROCEDURE — 63710000001 INSULIN DETEMIR PER 5 UNITS: Performed by: HOSPITALIST

## 2020-09-02 PROCEDURE — 99232 SBSQ HOSP IP/OBS MODERATE 35: CPT | Performed by: INTERNAL MEDICINE

## 2020-09-02 PROCEDURE — 82962 GLUCOSE BLOOD TEST: CPT

## 2020-09-02 PROCEDURE — 63710000001 INSULIN LISPRO (HUMAN) PER 5 UNITS: Performed by: HOSPITALIST

## 2020-09-02 PROCEDURE — 80069 RENAL FUNCTION PANEL: CPT | Performed by: INTERNAL MEDICINE

## 2020-09-02 PROCEDURE — 94799 UNLISTED PULMONARY SVC/PX: CPT

## 2020-09-02 RX ORDER — IPRATROPIUM BROMIDE AND ALBUTEROL SULFATE 2.5; .5 MG/3ML; MG/3ML
3 SOLUTION RESPIRATORY (INHALATION) EVERY 4 HOURS PRN
Status: DISCONTINUED | OUTPATIENT
Start: 2020-09-02 | End: 2020-09-04 | Stop reason: HOSPADM

## 2020-09-02 RX ORDER — TERAZOSIN 5 MG/1
10 CAPSULE ORAL EVERY 12 HOURS SCHEDULED
Status: DISCONTINUED | OUTPATIENT
Start: 2020-09-02 | End: 2020-09-04 | Stop reason: HOSPADM

## 2020-09-02 RX ADMIN — METOPROLOL TARTRATE 100 MG: 100 TABLET ORAL at 08:46

## 2020-09-02 RX ADMIN — ALPRAZOLAM 1 MG: 0.25 TABLET ORAL at 09:50

## 2020-09-02 RX ADMIN — FOLIC ACID 1 MG: 1 TABLET ORAL at 08:46

## 2020-09-02 RX ADMIN — ASPIRIN 81 MG CHEWABLE TABLET 81 MG: 81 TABLET CHEWABLE at 08:46

## 2020-09-02 RX ADMIN — DOCUSATE SODIUM 100 MG: 100 CAPSULE, LIQUID FILLED ORAL at 08:46

## 2020-09-02 RX ADMIN — HYDRALAZINE HYDROCHLORIDE 100 MG: 50 TABLET, FILM COATED ORAL at 05:31

## 2020-09-02 RX ADMIN — FAMOTIDINE 20 MG: 20 TABLET, FILM COATED ORAL at 18:09

## 2020-09-02 RX ADMIN — INSULIN LISPRO 4 UNITS: 100 INJECTION, SOLUTION INTRAVENOUS; SUBCUTANEOUS at 12:20

## 2020-09-02 RX ADMIN — HYDRALAZINE HYDROCHLORIDE 100 MG: 50 TABLET, FILM COATED ORAL at 21:24

## 2020-09-02 RX ADMIN — NICOTINE 1 PATCH: 21 PATCH, EXTENDED RELEASE TRANSDERMAL at 08:47

## 2020-09-02 RX ADMIN — PRAVASTATIN SODIUM 80 MG: 40 TABLET ORAL at 08:46

## 2020-09-02 RX ADMIN — METOPROLOL TARTRATE 100 MG: 100 TABLET ORAL at 21:24

## 2020-09-02 RX ADMIN — ALPRAZOLAM 1 MG: 0.25 TABLET ORAL at 22:10

## 2020-09-02 RX ADMIN — FAMOTIDINE 20 MG: 20 TABLET, FILM COATED ORAL at 08:46

## 2020-09-02 RX ADMIN — APIXABAN 5 MG: 5 TABLET, FILM COATED ORAL at 08:48

## 2020-09-02 RX ADMIN — INSULIN DETEMIR 10 UNITS: 100 INJECTION, SOLUTION SUBCUTANEOUS at 08:48

## 2020-09-02 RX ADMIN — AMLODIPINE BESYLATE 5 MG: 5 TABLET ORAL at 08:46

## 2020-09-02 RX ADMIN — IPRATROPIUM BROMIDE AND ALBUTEROL SULFATE 3 ML: 2.5; .5 SOLUTION RESPIRATORY (INHALATION) at 08:31

## 2020-09-02 RX ADMIN — SODIUM CHLORIDE, PRESERVATIVE FREE 10 ML: 5 INJECTION INTRAVENOUS at 21:24

## 2020-09-02 RX ADMIN — APIXABAN 5 MG: 5 TABLET, FILM COATED ORAL at 21:24

## 2020-09-02 RX ADMIN — TERAZOSIN HYDROCHLORIDE 10 MG: 5 CAPSULE ORAL at 21:24

## 2020-09-02 RX ADMIN — IPRATROPIUM BROMIDE AND ALBUTEROL SULFATE 3 ML: 2.5; .5 SOLUTION RESPIRATORY (INHALATION) at 21:58

## 2020-09-02 RX ADMIN — HYDRALAZINE HYDROCHLORIDE 100 MG: 50 TABLET, FILM COATED ORAL at 15:47

## 2020-09-02 RX ADMIN — TERAZOSIN HYDROCHLORIDE 5 MG: 5 CAPSULE ORAL at 08:46

## 2020-09-02 RX ADMIN — CHOLECALCIFEROL (VITAMIN D3) 10 MCG (400 UNIT) TABLET 1000 UNITS: at 09:50

## 2020-09-02 NOTE — PROGRESS NOTES
[facility]       LOS: 8 days   Patient Care Team:  Daiana Coyne APRN as PCP - General (Nurse Practitioner)        Subjective       Patient Denies:  Cp, palpitations.  Breathing ok.  Feels good.  Wants to go home.      Vital Signs  Temp:  [97.7 °F (36.5 °C)-98.3 °F (36.8 °C)] 97.7 °F (36.5 °C)  Heart Rate:  [68-86] 75  Resp:  [16-24] 18  BP: (133-164)/(57-69) 155/69    Intake/Output Summary (Last 24 hours) at 9/2/2020 0925  Last data filed at 9/2/2020 0410  Gross per 24 hour   Intake 540 ml   Output 1000 ml   Net -460 ml     No intake/output data recorded.    Physical Exam:     General Appearance:    Alert, cooperative, in no acute distress       Neck:   No adenopathy, supple, trachea midline, no thyromegaly, no JVD   Lungs:     Decreased to auscultation,respirations regular, even and                unlabored    Heart:    Regular rhythm and normal rate, normal S1 and S2, no         murmur, no gallop, no rub, no click   Chest Wall:    No abnormalities observed   Abdomen:     Normal bowel sounds, no masses, no organomegaly, soft     nontender, nondistended   Extremities:   Moves all extremities well, + edema, no cyanosis, no           redness   Pulses:   Pulses palpable and equal bilaterally     Results Review:     I reviewed the patient's new clinical results.      WBC WBC   Date/Time Value Ref Range Status   09/02/2020 0819 8.52 3.40 - 10.80 10*3/mm3 Final   08/31/2020 1140 9.27 3.40 - 10.80 10*3/mm3 Final            HGB Hemoglobin   Date/Time Value Ref Range Status   09/02/2020 0819 11.5 (L) 12.0 - 15.9 g/dL Final   08/31/2020 1140 11.6 (L) 12.0 - 15.9 g/dL Final           HCT Hematocrit   Date/Time Value Ref Range Status   09/02/2020 0819 35.9 34.0 - 46.6 % Final   08/31/2020 1140 37.1 34.0 - 46.6 % Final            Platelets Platelets   Date/Time Value Ref Range Status   09/02/2020 0819 188 140 - 450 10*3/mm3 Final   08/31/2020 1140 198 140 -  450 10*3/mm3 Final     Sodium  Sodium   Date/Time Value Ref Range Status   09/01/2020 1014 137 136 - 145 mmol/L Final   08/31/2020 1140 140 136 - 145 mmol/L Final     Potassium  Potassium   Date/Time Value Ref Range Status   09/01/2020 1014 4.5 3.5 - 5.2 mmol/L Final   09/01/2020 1014 4.5 3.5 - 5.2 mmol/L Final   08/31/2020 1140 3.4 (L) 3.5 - 5.2 mmol/L Final     Comment:     Slight hemolysis detected by analyzer. Results may be affected.     Chloride  Chloride   Date/Time Value Ref Range Status   09/01/2020 1014 98 98 - 107 mmol/L Final   08/31/2020 1140 97 (L) 98 - 107 mmol/L Final     BicarbonateNo results found for: PLASMABICARB    BUN BUN   Date/Time Value Ref Range Status   09/01/2020 1014 75 (H) 6 - 20 mg/dL Final   08/31/2020 1140 70 (H) 6 - 20 mg/dL Final      Creatinine Creatinine   Date/Time Value Ref Range Status   09/01/2020 1014 4.12 (H) 0.57 - 1.00 mg/dL Final   08/31/2020 1140 3.83 (H) 0.57 - 1.00 mg/dL Final      Calcium Calcium   Date/Time Value Ref Range Status   09/01/2020 1014 9.5 8.6 - 10.5 mg/dL Final   08/31/2020 1140 9.5 8.6 - 10.5 mg/dL Final      Mag @RESULFAST(MG:3)@        PT/INR:       Lab Results   Component Value Date    PROTIME 14.9 (H) 08/25/2020    INR 1.20 (H) 08/25/2020      Troponin I:  No results found for: TROPONINI   Lab Results   Component Value Date    TROPONINT 0.092 (C) 08/26/2020         amLODIPine 5 mg Oral Q24H   apixaban 5 mg Oral Q12H   aspirin 81 mg Oral Daily   cholecalciferol 1,000 Units Oral Daily   docusate sodium 100 mg Oral Daily   famotidine 20 mg Oral BID AC   folic acid 1 mg Oral Daily   hydrALAZINE 100 mg Oral Q8H   insulin detemir 10 Units Subcutaneous Q24H   insulin lispro 0-7 Units Subcutaneous TID AC   insulin lispro 5 Units Subcutaneous TID With Meals   metoprolol tartrate 100 mg Oral Q12H   nicotine 1 patch Transdermal Q24H   pharmacy consult - MTM  Does not apply Daily   pravastatin 80 mg Oral Daily   sodium chloride 10 mL Intravenous Q12H    terazosin 5 mg Oral Q12H       dilTIAZem 5-15 mg/hr Last Rate: 5 mg/hr (08/29/20 1900)   niCARdipine 5-15 mg/hr Last Rate: Stopped (08/29/20 1112)       Assessment/Plan     Patient Active Problem List   Diagnosis Code   • Atrial fibrillation with RVR (CMS/HCC) I48.91   • Acute respiratory failure with hypoxia (CMS/HCC) J96.01   • Pneumonia J18.9   • CHF (congestive heart failure) (CMS/HCC) I50.9   • HTN (hypertension) I10   • Anxiety F41.9   • MILKA (acute kidney injury) (CMS/HCC) N17.9   • CKD (chronic kidney disease) stage 4, GFR 15-29 ml/min (CMS/HCC) N18.4   • Macrocytic anemia D53.9   • Renal artery stenosis s/p stent I70.1   • COPD (chronic obstructive pulmonary disease) (CMS/HCC) J44.9   • CAD (coronary artery disease) s/p stent I25.10   • Tobacco abuse Z72.0   • Type 2 diabetes mellitus (CMS/HCC) E11.9     Hypertension   Normal EF  Paroxysmal atrial fibrillation maintaining sinus rhythm  Chronic kidney disease; increase in creatinine yesterday and again today  COPD      Continue anticoagulation  Renal following  Hopefully home soon when renal function stabilizes  Increase bp meds  Okay from cardiology standpoint    MD Michael Hubbard PA  09/02/20  09:25

## 2020-09-02 NOTE — PLAN OF CARE
Problem: Fall Risk (Adult)  Goal: Identify Related Risk Factors and Signs and Symptoms  Flowsheets (Taken 9/2/2020 0426)  Related Risk Factors (Fall Risk): fatigue/slow reaction  Signs and Symptoms (Fall Risk): presence of risk factors  Goal: Absence of Fall  Flowsheets (Taken 9/2/2020 0426)  Absence of Fall: making progress toward outcome     Problem: Pain, Chronic (Adult)  Goal: Identify Related Risk Factors and Signs and Symptoms  Flowsheets (Taken 9/2/2020 0426)  Related Risk Factors (Chronic Pain): coping ineffective; disease process  Signs and Symptoms (Chronic Pain): verbalization of pain descriptors; fatigue/weakness  Goal: Acceptable Pain/Comfort Level and Functional Ability  Flowsheets (Taken 9/2/2020 0426)  Acceptable Pain/Comfort Level and Functional Ability: making progress toward outcome     Problem: Patient Care Overview  Goal: Plan of Care Review  Flowsheets (Taken 9/2/2020 0426)  Progress: improving  Plan of Care Reviewed With: patient  Outcome Summary: Resting in bed. Alert and oriented. VSS. RA. Denied any pain, discomfort. Slept well throughout shift, Monitoring kidney function. Voiding without diffuclty. Plan of care discussed with pt. Verbalized udnerstanding. Will continue to monitor.  Note:   PPl     Problem: Arrhythmia/Dysrhythmia (Symptomatic) (Adult)  Goal: Signs and Symptoms of Listed Potential Problems Will be Absent, Minimized or Managed (Arrhythmia/Dysrhythmia)  Flowsheets (Taken 9/2/2020 0426)  Problems Assessed (Arrhythmia/Dysrhythmia): all  Problems Present (Dysrhythmia): situational response

## 2020-09-02 NOTE — PROGRESS NOTES
"   LOS: 8 days    Patient Care Team:  Daiana Coyne APRN as PCP - General (Nurse Practitioner)    Chief Complaint: MILKA on CKD stage III and IV    Subjective   Interval History:   No new events no new complaints. Want to go home.   Review of Systems:   Denies N/V, CP or SOA    Objective     Vital Sign Min/Max for last 24 hours  Temp  Min: 97.7 °F (36.5 °C)  Max: 98.3 °F (36.8 °C)   BP  Min: 133/57  Max: 164/65   Pulse  Min: 69  Max: 86   Resp  Min: 16  Max: 24   SpO2  Min: 93 %  Max: 98 %   No data recorded   Weight  Min: 112 kg (248 lb)  Max: 113 kg (248 lb 14.4 oz)     Flowsheet Rows      First Filed Value   Admission Height  165.1 cm (65\") Documented at 08/25/2020 2110   Admission Weight  113 kg (250 lb) Documented at 08/25/2020 2110          No intake/output data recorded.  I/O last 3 completed shifts:  In: 540 [P.O.:540]  Out: 1000 [Urine:1000]    Physical Exam:  General Appearance: Alert oriented x3 , NAD  Eyes: normal conjunctiva, EOMI.  Neck: Supple no JVD.  Lungs: Clear auscultation,  nonlabored.  Heart: No gallop, murmur, rub, RRR.  Abdomen: Soft, nontender, positive bowel sounds, ND  Extremities: No edema, no cyanosis.  Neuro: No focal deficit, moving all extremities  AV fistula right upper arm.  Functional    WBC WBC   Date Value Ref Range Status   09/02/2020 8.52 3.40 - 10.80 10*3/mm3 Final   08/31/2020 9.27 3.40 - 10.80 10*3/mm3 Final      HGB Hemoglobin   Date Value Ref Range Status   09/02/2020 11.5 (L) 12.0 - 15.9 g/dL Final   08/31/2020 11.6 (L) 12.0 - 15.9 g/dL Final      HCT Hematocrit   Date Value Ref Range Status   09/02/2020 35.9 34.0 - 46.6 % Final   08/31/2020 37.1 34.0 - 46.6 % Final      Platlets No results found for: LABPLAT   MCV MCV   Date Value Ref Range Status   09/02/2020 96.5 79.0 - 97.0 fL Final   08/31/2020 94.4 79.0 - 97.0 fL Final          Sodium Sodium   Date Value Ref Range Status   09/02/2020 137 136 - 145 mmol/L Final   09/01/2020 137 136 - 145 mmol/L Final   08/31/2020 " 140 136 - 145 mmol/L Final      Potassium Potassium   Date Value Ref Range Status   09/02/2020 4.5 3.5 - 5.2 mmol/L Final   09/01/2020 4.5 3.5 - 5.2 mmol/L Final   09/01/2020 4.5 3.5 - 5.2 mmol/L Final   08/31/2020 3.4 (L) 3.5 - 5.2 mmol/L Final     Comment:     Slight hemolysis detected by analyzer. Results may be affected.      Chloride Chloride   Date Value Ref Range Status   09/02/2020 101 98 - 107 mmol/L Final   09/01/2020 98 98 - 107 mmol/L Final   08/31/2020 97 (L) 98 - 107 mmol/L Final      CO2 CO2   Date Value Ref Range Status   09/02/2020 25.0 22.0 - 29.0 mmol/L Final   09/01/2020 25.0 22.0 - 29.0 mmol/L Final   08/31/2020 27.0 22.0 - 29.0 mmol/L Final      BUN BUN   Date Value Ref Range Status   09/02/2020 78 (H) 6 - 20 mg/dL Final   09/01/2020 75 (H) 6 - 20 mg/dL Final   08/31/2020 70 (H) 6 - 20 mg/dL Final      Creatinine Creatinine   Date Value Ref Range Status   09/02/2020 4.11 (H) 0.57 - 1.00 mg/dL Final   09/01/2020 4.12 (H) 0.57 - 1.00 mg/dL Final   08/31/2020 3.83 (H) 0.57 - 1.00 mg/dL Final      Calcium Calcium   Date Value Ref Range Status   09/02/2020 9.3 8.6 - 10.5 mg/dL Final   09/01/2020 9.5 8.6 - 10.5 mg/dL Final   08/31/2020 9.5 8.6 - 10.5 mg/dL Final      PO4 No results found for: CAPO4   Albumin Albumin   Date Value Ref Range Status   09/02/2020 3.50 3.50 - 5.20 g/dL Final   09/01/2020 3.80 3.50 - 5.20 g/dL Final   08/31/2020 3.70 3.50 - 5.20 g/dL Final      Magnesium No results found for: MG   Uric Acid No results found for: URICACID        Results Review:     I reviewed the patient's new clinical results.      amLODIPine 5 mg Oral Q24H   apixaban 5 mg Oral Q12H   aspirin 81 mg Oral Daily   cholecalciferol 1,000 Units Oral Daily   docusate sodium 100 mg Oral Daily   famotidine 20 mg Oral BID AC   folic acid 1 mg Oral Daily   hydrALAZINE 100 mg Oral Q8H   insulin detemir 10 Units Subcutaneous Q24H   insulin lispro 0-7 Units Subcutaneous TID AC   insulin lispro 5 Units Subcutaneous TID  With Meals   metoprolol tartrate 100 mg Oral Q12H   nicotine 1 patch Transdermal Q24H   pharmacy consult - MTM  Does not apply Daily   pravastatin 80 mg Oral Daily   sodium chloride 10 mL Intravenous Q12H   terazosin 10 mg Oral Q12H       dilTIAZem 5-15 mg/hr Last Rate: 5 mg/hr (08/29/20 1900)   niCARdipine 5-15 mg/hr Last Rate: Stopped (08/29/20 1112)       Medication Review: Reviewed    Assessment/Plan    1.  MILKA on CKD stage IV- . Secondary to diuresis. Stable.   2.  CKD stage IV - Hx of CHELITA s/p stent in 2009, Atrophic right kidney and DN. On dialysis in the past.  No renal artery stenosis on the left per duplex scan.  Left kidney 11.4 cm.  3.  Met acidosis: Resolved   4.  Afib w RVR: Stable   5.  Volume status:  Improved   6   DM  7.  Anemia  8.  Hypertension   9- Proteinuria - UPCR 1.1 gm.     Plan:  - Continue to hold diuretics.   - Monitor I/o   - Avoid nephrotoxic agents  - Adjust meds per renal function   - No need of RRT.     Juliane Rodríguez MD  09/02/20  11:25

## 2020-09-02 NOTE — PROGRESS NOTES
[facility]  PROGRESS NOTE    Patient Name: Johanne Lucas  : 1965  MRN: [id number]    Date of Admission: 2020  Primary Care Physician: Daiana Coyne APRN    Subjective   Subjective     CC: Shortness of breath    HPI:    No dyspnea. No chest pain, no palpitations. No abd pain    Review of Systems    No headache, no rash    Objective   Objective     Vital Signs:   Temp:  [97.7 °F (36.5 °C)-98.8 °F (37.1 °C)] 98.8 °F (37.1 °C)  Heart Rate:  [69-86] 69  Resp:  [16-24] 18  BP: (133-166)/(57-69) 166/64     Physical Exam:    Constitutional:Alert, oriented x 3, nontoxic appearing  Psych:Normal/appropriate affect  HEENT:Ncat, oroph clear  Neck: neck supple, full range of motion  Neuro: Face symmetric, speech clear, equal , moves all extremities  Cardiac: Rrr; No pretibial pitting edema  Resp: Ctab, normal effort  GI: abd soft, nontender, obese, +bs  Skin: No extremity rash  Musculoskeletal/extremities: no cyanosis extremities; no significant ankle edema        Results Reviewed:  Results from last 7 days   Lab Units 20  0819 20  1140 20  0344   WBC 10*3/mm3 8.52 9.27 11.20*   HEMOGLOBIN g/dL 11.5* 11.6* 10.3*   HEMATOCRIT % 35.9 37.1 33.5*   PLATELETS 10*3/mm3 188 198 184     Results from last 7 days   Lab Units 20  0819 20  1014 20  1140   SODIUM mmol/L 137 137 140   POTASSIUM mmol/L 4.5 4.5  4.5 3.4*   CHLORIDE mmol/L 101 98 97*   CO2 mmol/L 25.0 25.0 27.0   BUN mg/dL 78* 75* 70*   CREATININE mg/dL 4.11* 4.12* 3.83*   GLUCOSE mg/dL 187* 236* 90   CALCIUM mg/dL 9.3 9.5 9.5     Estimated Creatinine Clearance: 20.2 mL/min (A) (by C-G formula based on SCr of 4.11 mg/dL (H)).    Microbiology Results Abnormal     Procedure Component Value - Date/Time    Blood Culture - Blood, Arm, Left [[id number]] Collected:  20    Lab Status:  Final result Specimen:  Blood from Arm, Left Updated:  20     Blood Culture  No growth at 5 days    Blood Culture - Blood, Arm, Right [[id number]] Collected:  08/25/20 2118    Lab Status:  Final result Specimen:  Blood from Arm, Right Updated:  08/30/20 2145     Blood Culture No growth at 5 days    S. Pneumo Ag Urine or CSF - Urine, Urine, Clean Catch [[id number]]  (Normal) Collected:  08/26/20 0405    Lab Status:  Final result Specimen:  Urine, Clean Catch Updated:  08/26/20 0911     Strep Pneumo Ag Negative    Legionella Antigen, Urine - Urine, Urine, Clean Catch [[id number]]  (Normal) Collected:  08/26/20 0405    Lab Status:  Final result Specimen:  Urine, Clean Catch Updated:  08/26/20 0910     LEGIONELLA ANTIGEN, URINE Negative    Respiratory Culture - Sputum, Cough [[id number]] Collected:  08/26/20 0700    Lab Status:  Final result Specimen:  Sputum from Cough Updated:  08/26/20 0852     Respiratory Culture Rejected     Gram Stain Many (4+) Epithelial cells per low power field      Rare (1+) WBCs per low power field      Moderate (3+) Mixed bacterial morphotypes seen on Gram Stain    Narrative:       Specimen rejected due to oropharyngeal contamination.    Respiratory Panel PCR w/COVID-19(SARS-CoV-2) [facility] In-House, NP Swab in UTM/VTM, 3-4 HR TAT - Swab, Nasopharynx [[id number]]  (Normal) Collected:  08/26/20 0131    Lab Status:  Final result Specimen:  Swab from Nasopharynx Updated:  08/26/20 0309     ADENOVIRUS, PCR Not Detected     Coronavirus 229E Not Detected     Coronavirus HKU1 Not Detected     Coronavirus NL63 Not Detected     Coronavirus OC43 Not Detected     COVID19 Not Detected     Human Metapneumovirus Not Detected     Human Rhinovirus/Enterovirus Not Detected     Influenza A PCR Not Detected     Influenza A H1 Not Detected     Influenza A H1 2009 PCR Not Detected     Influenza A H3 Not Detected     Influenza B PCR Not Detected     Parainfluenza Virus 1 Not Detected     Parainfluenza Virus 2 Not Detected     Parainfluenza Virus 3 Not Detected     Parainfluenza Virus 4  Not Detected     RSV, PCR Not Detected     Bordetella pertussis pcr Not Detected     Bordetella parapertussis PCR Not Detected     Chlamydophila pneumoniae PCR Not Detected     Mycoplasma pneumo by PCR Not Detected    Narrative:       Fact sheet for providers: https://docs.[employer]/wp-content/uploads/EVU9027-4939-JY4.1-EUA-Provider-Fact-Sheet-3.pdf    Fact sheet for patients: https://docs.[employer]/wp-content/uploads/BLB3844-2242-QL2.1-EUA-Patient-Fact-Sheet-1.pdf        Imaging Results (Last 24 Hours)     ** No results found for the last 24 hours. **        Results for orders placed during the hospital encounter of 08/25/20   Transthoracic Echo Complete With Contrast if Necessary Per Protocol    Narrative · Left ventricular wall thickness is consistent with mild-to-moderate   concentric hypertrophy.  · Left ventricular systolic function is normal.  · Left ventricular diastolic dysfunction.  · Right ventricular cavity is moderately dilated.  · Left atrial cavity size is mildly dilated.  · Mild mitral valve regurgitation is present  · Mild tricuspid valve regurgitation is present.  · Calculated right ventricular systolic pressure from tricuspid   regurgitation is 31 mmHg.  · Mild pulmonic valve regurgitation is present.  · The aortic valve has a small non mobile echogenic thickening of the L   coronary cusp. This could represent a healed vegetation.        I have reviewed the medications:  Scheduled Meds:    amLODIPine 5 mg Oral Q24H   apixaban 5 mg Oral Q12H   aspirin 81 mg Oral Daily   cholecalciferol 1,000 Units Oral Daily   docusate sodium 100 mg Oral Daily   famotidine 20 mg Oral BID AC   folic acid 1 mg Oral Daily   hydrALAZINE 100 mg Oral Q8H   insulin detemir 10 Units Subcutaneous Q24H   insulin lispro 0-7 Units Subcutaneous TID AC   insulin lispro 5 Units Subcutaneous TID With Meals   metoprolol tartrate 100 mg Oral Q12H   nicotine 1 patch Transdermal Q24H   pharmacy consult - MTM  Does not apply  Daily   pravastatin 80 mg Oral Daily   sodium chloride 10 mL Intravenous Q12H   terazosin 10 mg Oral Q12H     Continuous Infusions:    dilTIAZem 5-15 mg/hr Last Rate: 5 mg/hr (08/29/20 1900)   niCARdipine 5-15 mg/hr Last Rate: Stopped (08/29/20 1112)     PRN Meds:.•  acetaminophen **OR** acetaminophen **OR** acetaminophen  •  ALPRAZolam  •  dextrose  •  dextrose  •  glucagon (human recombinant)  •  HYDROcodone-acetaminophen  •  ipratropium-albuterol  •  ipratropium-albuterol  •  melatonin  •  ondansetron  •  potassium chloride  •  potassium chloride  •  sodium chloride    Assessment/Plan   Assessment & Plan     Active Hospital Problems    Diagnosis  POA   • **Atrial fibrillation with RVR (CMS/HCC) [I48.91]  Yes   • Acute respiratory failure with hypoxia (CMS/HCC) [J96.01]  Unknown   • Pneumonia [J18.9]  Unknown   • CHF (congestive heart failure) (CMS/HCC) [I50.9]  Unknown   • HTN (hypertension) [I10]  Unknown   • Anxiety [F41.9]  Unknown   • MILKA (acute kidney injury) (CMS/HCC) [N17.9]  Unknown   • CKD (chronic kidney disease) stage 4, GFR 15-29 ml/min (CMS/HCC) [N18.4]  Unknown   • Macrocytic anemia [D53.9]  Unknown   • Renal artery stenosis s/p stent [I70.1]  Unknown   • COPD (chronic obstructive pulmonary disease) (CMS/HCC) [J44.9]  Unknown   • CAD (coronary artery disease) s/p stent [I25.10]  Unknown   • Tobacco abuse [Z72.0]  Unknown   • Type 2 diabetes mellitus (CMS/HCC) [E11.9]  Unknown      Resolved Hospital Problems   No resolved problems to display.        Brief Hospital Course to date:  Johanne Lucas is a 55 y.o. female past medical history of CHF, atrial fibrillation on Eliquis, diabetes, coronary artery disease with stent, COPD, tobacco use, ckd 4 who presented to the ER with acute onset of palpitations, shortness of breath, diaphoresis and chest pain.  The patient states that after waking up from a nap she became short of breath with racing heart and chest pain that radiated into her  neck    Adjusting medication for hypertension and renal dysfunction.  Had to de-escalate insulin therapy and bowel regimen.  She says she takes Lactulose at home, but now we are holding    This patient's problems and plans were partially entered by my partner and updated as appropriate by me 09/02/20. Today is my first day evaluating this patients active medical problems. I Personally reviewed chart and adjusted note to reflect daily changes in management/clinical condition          Acute Hypoxic resp failure    Bilateral pulm infiltrates, favor DHF  -completed empiric antibiotics  -s/p diuresis  -Continue with antimicrobial coverage  -Wean oxygen as tolerated  -Elevated BNP - 2,842    Acute on chronic DHF  -s/p diuresis  -stopped lasix 9/1/20 due to rising creatinine    Aortic valve thickening on echo  -per cards    Parox Afib, currently in sinus rhythm  -on eliquis    Acute on Chronic kidney disease 4  -Presumed diabetic neuropathy  -Has had dialysis in the past  -creatinine 2.8--> 3.8-->4.1-->4.1 today  -stopped lasix 9/1/20  -nephrology following    Diabetes  -Uncontrolled diabetes with hemoglobin A1c 9.2  -Adjust insulins prn    Hypertension  -controlled currently; cards titrating bp meds      Obesity    --------------------------------------------------------    Plan:  -renal function stable today (4.1). Stopped lasix (last dose 9/1 a.m.) nephrology following. Respiratory status good currently. Monitor volume status & renal function. Home when renal function improved & ok w/ nephrology (final diuretic dosing per nephrology)    -room air sats ok while awake currently, likely need night oxygen    -continue rate control per cards; continue eliquis for now (d/w pharmacy) for afib    Am labs: bmp    DVT Prophylaxis: Apixaban    Disposition: I expect the patient to be discharged home when renal function stable & ok w/ consultants    CODE STATUS:   Code Status and Medical Interventions:   Ordered at: 08/26/20 0055      Level Of Support Discussed With:    Patient     Code Status:    CPR     Medical Interventions (Level of Support Prior to Arrest):    Full         Electronically signed by Dequan Mae MD, 09/02/20, 14:57.

## 2020-09-02 NOTE — PROGRESS NOTES
Continued Stay Note  [facility]     Patient Name: Johanne Lucas  MRN: [id number]  Today's Date: 9/2/2020    Admit Date: 8/25/2020    Discharge Plan     Row Name 09/02/20 1533       Plan    Plan  Home at discharge     Patient/Family in Agreement with Plan  yes    Plan Comments  Goal remains for patient to return home upon discharge. Attempts to wean 02 being made - denies needs at this time, CM will follow for any discharge needs that may arise - yue [phone]     Final Discharge Disposition Code  01 - home or self-care        Discharge Codes    No documentation.             Yue Vivar RN

## 2020-09-02 NOTE — PLAN OF CARE
VSS. Pt remains in NSR and on RA. Waiting for kidney function to improve. Will continue to monitor.

## 2020-09-03 LAB
ALBUMIN SERPL-MCNC: 3.8 G/DL (ref 3.5–5.2)
ANION GAP SERPL CALCULATED.3IONS-SCNC: 13 MMOL/L (ref 5–15)
BUN SERPL-MCNC: 80 MG/DL (ref 6–20)
BUN/CREAT SERPL: 18 (ref 7–25)
CALCIUM SPEC-SCNC: 9.4 MG/DL (ref 8.6–10.5)
CHLORIDE SERPL-SCNC: 99 MMOL/L (ref 98–107)
CO2 SERPL-SCNC: 23 MMOL/L (ref 22–29)
CREAT SERPL-MCNC: 4.45 MG/DL (ref 0.57–1)
GFR SERPL CREATININE-BSD FRML MDRD: 13 ML/MIN/1.73
GFR SERPL CREATININE-BSD FRML MDRD: ABNORMAL ML/MIN/{1.73_M2}
GLUCOSE BLDC GLUCOMTR-MCNC: 133 MG/DL (ref 70–130)
GLUCOSE BLDC GLUCOMTR-MCNC: 162 MG/DL (ref 70–130)
GLUCOSE BLDC GLUCOMTR-MCNC: 215 MG/DL (ref 70–130)
GLUCOSE BLDC GLUCOMTR-MCNC: 242 MG/DL (ref 70–130)
GLUCOSE SERPL-MCNC: 182 MG/DL (ref 65–99)
PHOSPHATE SERPL-MCNC: 4.8 MG/DL (ref 2.5–4.5)
POTASSIUM SERPL-SCNC: 4.3 MMOL/L (ref 3.5–5.2)
SODIUM SERPL-SCNC: 135 MMOL/L (ref 136–145)

## 2020-09-03 PROCEDURE — 99232 SBSQ HOSP IP/OBS MODERATE 35: CPT | Performed by: NURSE PRACTITIONER

## 2020-09-03 PROCEDURE — 63710000001 INSULIN LISPRO (HUMAN) PER 5 UNITS: Performed by: INTERNAL MEDICINE

## 2020-09-03 PROCEDURE — 94799 UNLISTED PULMONARY SVC/PX: CPT

## 2020-09-03 PROCEDURE — 63710000001 INSULIN LISPRO (HUMAN) PER 5 UNITS: Performed by: HOSPITALIST

## 2020-09-03 PROCEDURE — 63710000001 INSULIN DETEMIR PER 5 UNITS: Performed by: HOSPITALIST

## 2020-09-03 PROCEDURE — 80069 RENAL FUNCTION PANEL: CPT | Performed by: INTERNAL MEDICINE

## 2020-09-03 PROCEDURE — 82962 GLUCOSE BLOOD TEST: CPT

## 2020-09-03 RX ORDER — SODIUM CHLORIDE 9 MG/ML
100 INJECTION, SOLUTION INTRAVENOUS CONTINUOUS
Status: DISCONTINUED | OUTPATIENT
Start: 2020-09-03 | End: 2020-09-04 | Stop reason: HOSPADM

## 2020-09-03 RX ADMIN — ALPRAZOLAM 1 MG: 0.25 TABLET ORAL at 09:58

## 2020-09-03 RX ADMIN — APIXABAN 5 MG: 5 TABLET, FILM COATED ORAL at 20:35

## 2020-09-03 RX ADMIN — INSULIN LISPRO 2 UNITS: 100 INJECTION, SOLUTION INTRAVENOUS; SUBCUTANEOUS at 08:08

## 2020-09-03 RX ADMIN — SODIUM CHLORIDE 100 ML/HR: 9 INJECTION, SOLUTION INTRAVENOUS at 13:13

## 2020-09-03 RX ADMIN — INSULIN LISPRO 5 UNITS: 100 INJECTION, SOLUTION INTRAVENOUS; SUBCUTANEOUS at 17:44

## 2020-09-03 RX ADMIN — FAMOTIDINE 20 MG: 20 TABLET, FILM COATED ORAL at 06:52

## 2020-09-03 RX ADMIN — HYDRALAZINE HYDROCHLORIDE 100 MG: 50 TABLET, FILM COATED ORAL at 20:36

## 2020-09-03 RX ADMIN — AMLODIPINE BESYLATE 5 MG: 5 TABLET ORAL at 08:06

## 2020-09-03 RX ADMIN — TERAZOSIN HYDROCHLORIDE 10 MG: 5 CAPSULE ORAL at 20:35

## 2020-09-03 RX ADMIN — IPRATROPIUM BROMIDE AND ALBUTEROL SULFATE 3 ML: 2.5; .5 SOLUTION RESPIRATORY (INHALATION) at 07:03

## 2020-09-03 RX ADMIN — INSULIN LISPRO 3 UNITS: 100 INJECTION, SOLUTION INTRAVENOUS; SUBCUTANEOUS at 17:43

## 2020-09-03 RX ADMIN — IPRATROPIUM BROMIDE AND ALBUTEROL SULFATE 3 ML: 2.5; .5 SOLUTION RESPIRATORY (INHALATION) at 21:17

## 2020-09-03 RX ADMIN — METOPROLOL TARTRATE 100 MG: 100 TABLET ORAL at 20:35

## 2020-09-03 RX ADMIN — SODIUM CHLORIDE, PRESERVATIVE FREE 10 ML: 5 INJECTION INTRAVENOUS at 20:35

## 2020-09-03 RX ADMIN — DOCUSATE SODIUM 100 MG: 100 CAPSULE, LIQUID FILLED ORAL at 08:08

## 2020-09-03 RX ADMIN — FAMOTIDINE 20 MG: 20 TABLET, FILM COATED ORAL at 17:43

## 2020-09-03 RX ADMIN — ALPRAZOLAM 1 MG: 0.25 TABLET ORAL at 22:09

## 2020-09-03 RX ADMIN — SODIUM CHLORIDE, PRESERVATIVE FREE 10 ML: 5 INJECTION INTRAVENOUS at 08:06

## 2020-09-03 RX ADMIN — APIXABAN 5 MG: 5 TABLET, FILM COATED ORAL at 08:08

## 2020-09-03 RX ADMIN — NICOTINE 1 PATCH: 21 PATCH, EXTENDED RELEASE TRANSDERMAL at 08:08

## 2020-09-03 RX ADMIN — INSULIN LISPRO 3 UNITS: 100 INJECTION, SOLUTION INTRAVENOUS; SUBCUTANEOUS at 13:13

## 2020-09-03 RX ADMIN — METOPROLOL TARTRATE 100 MG: 100 TABLET ORAL at 08:07

## 2020-09-03 RX ADMIN — ASPIRIN 81 MG CHEWABLE TABLET 81 MG: 81 TABLET CHEWABLE at 08:08

## 2020-09-03 RX ADMIN — HYDRALAZINE HYDROCHLORIDE 100 MG: 50 TABLET, FILM COATED ORAL at 13:13

## 2020-09-03 RX ADMIN — HYDRALAZINE HYDROCHLORIDE 100 MG: 50 TABLET, FILM COATED ORAL at 06:52

## 2020-09-03 RX ADMIN — PRAVASTATIN SODIUM 80 MG: 40 TABLET ORAL at 08:08

## 2020-09-03 RX ADMIN — INSULIN DETEMIR 10 UNITS: 100 INJECTION, SOLUTION SUBCUTANEOUS at 08:13

## 2020-09-03 RX ADMIN — TERAZOSIN HYDROCHLORIDE 10 MG: 5 CAPSULE ORAL at 08:08

## 2020-09-03 RX ADMIN — HYDROCODONE BITARTRATE AND ACETAMINOPHEN 1 TABLET: 7.5; 325 TABLET ORAL at 06:52

## 2020-09-03 RX ADMIN — FOLIC ACID 1 MG: 1 TABLET ORAL at 08:08

## 2020-09-03 RX ADMIN — CHOLECALCIFEROL (VITAMIN D3) 10 MCG (400 UNIT) TABLET 1000 UNITS: at 08:07

## 2020-09-03 NOTE — PROGRESS NOTES
Continued Stay Note  [facility]     Patient Name: Johanne Lucas  MRN: [id number]  Today's Date: 9/3/2020    Admit Date: 8/25/2020    Discharge Plan     Row Name 09/03/20 1132       Plan    Plan  CM update     Patient/Family in Agreement with Plan  yes    Plan Comments  Patient discussed during MDR  - her creatinine has continued to rise so she is not ready for discharge - awaiting futher nephrology decisions. Goal remains for patient to return home upon discharge - Cm following -yue [phone]         Discharge Codes    No documentation.             Yue Vivar RN

## 2020-09-03 NOTE — PROGRESS NOTES
"   LOS: 9 days    Patient Care Team:  Daiana Coyne APRN as PCP - General (Nurse Practitioner)    Chief Complaint: MILKA on CKD stage III and IV    Subjective   Interval History:   No new events no new complaints. Want to go home.   Review of Systems:   Denies N/V, CP or SOA    Objective     Vital Sign Min/Max for last 24 hours  Temp  Min: 97 °F (36.1 °C)  Max: 98.8 °F (37.1 °C)   BP  Min: 143/55  Max: 166/77   Pulse  Min: 59  Max: 77   Resp  Min: 16  Max: 20   SpO2  Min: 92 %  Max: 92 %   No data recorded   Weight  Min: 113 kg (249 lb 4.8 oz)  Max: 113 kg (249 lb 4.8 oz)     Flowsheet Rows      First Filed Value   Admission Height  165.1 cm (65\") Documented at 08/25/2020 2110   Admission Weight  113 kg (250 lb) Documented at 08/25/2020 2110          No intake/output data recorded.  I/O last 3 completed shifts:  In: 1500 [P.O.:1500]  Out: 2640 [Urine:2640]    Physical Exam:  General Appearance: Alert oriented x3 , NAD  Eyes: normal conjunctiva, EOMI.  Neck: Supple no JVD.  Lungs: Clear auscultation,  nonlabored.  Heart: No gallop, murmur, rub, RRR.  Abdomen: Soft, nontender, positive bowel sounds, ND  Extremities: No edema, no cyanosis.  Neuro: No focal deficit, moving all extremities  AV fistula right upper arm.  Functional    WBC WBC   Date Value Ref Range Status   09/02/2020 8.52 3.40 - 10.80 10*3/mm3 Final   08/31/2020 9.27 3.40 - 10.80 10*3/mm3 Final      HGB Hemoglobin   Date Value Ref Range Status   09/02/2020 11.5 (L) 12.0 - 15.9 g/dL Final   08/31/2020 11.6 (L) 12.0 - 15.9 g/dL Final      HCT Hematocrit   Date Value Ref Range Status   09/02/2020 35.9 34.0 - 46.6 % Final   08/31/2020 37.1 34.0 - 46.6 % Final      Platlets No results found for: LABPLAT   MCV MCV   Date Value Ref Range Status   09/02/2020 96.5 79.0 - 97.0 fL Final   08/31/2020 94.4 79.0 - 97.0 fL Final          Sodium Sodium   Date Value Ref Range Status   09/03/2020 135 (L) 136 - 145 mmol/L Final   09/02/2020 137 136 - 145 mmol/L Final "   09/01/2020 137 136 - 145 mmol/L Final   08/31/2020 140 136 - 145 mmol/L Final      Potassium Potassium   Date Value Ref Range Status   09/03/2020 4.3 3.5 - 5.2 mmol/L Final   09/02/2020 4.5 3.5 - 5.2 mmol/L Final   09/01/2020 4.5 3.5 - 5.2 mmol/L Final   09/01/2020 4.5 3.5 - 5.2 mmol/L Final   08/31/2020 3.4 (L) 3.5 - 5.2 mmol/L Final     Comment:     Slight hemolysis detected by analyzer. Results may be affected.      Chloride Chloride   Date Value Ref Range Status   09/03/2020 99 98 - 107 mmol/L Final   09/02/2020 101 98 - 107 mmol/L Final   09/01/2020 98 98 - 107 mmol/L Final   08/31/2020 97 (L) 98 - 107 mmol/L Final      CO2 CO2   Date Value Ref Range Status   09/03/2020 23.0 22.0 - 29.0 mmol/L Final   09/02/2020 25.0 22.0 - 29.0 mmol/L Final   09/01/2020 25.0 22.0 - 29.0 mmol/L Final   08/31/2020 27.0 22.0 - 29.0 mmol/L Final      BUN BUN   Date Value Ref Range Status   09/03/2020 80 (H) 6 - 20 mg/dL Final   09/02/2020 78 (H) 6 - 20 mg/dL Final   09/01/2020 75 (H) 6 - 20 mg/dL Final   08/31/2020 70 (H) 6 - 20 mg/dL Final      Creatinine Creatinine   Date Value Ref Range Status   09/03/2020 4.45 (H) 0.57 - 1.00 mg/dL Final   09/02/2020 4.11 (H) 0.57 - 1.00 mg/dL Final   09/01/2020 4.12 (H) 0.57 - 1.00 mg/dL Final   08/31/2020 3.83 (H) 0.57 - 1.00 mg/dL Final      Calcium Calcium   Date Value Ref Range Status   09/03/2020 9.4 8.6 - 10.5 mg/dL Final   09/02/2020 9.3 8.6 - 10.5 mg/dL Final   09/01/2020 9.5 8.6 - 10.5 mg/dL Final   08/31/2020 9.5 8.6 - 10.5 mg/dL Final      PO4 No results found for: CAPO4   Albumin Albumin   Date Value Ref Range Status   09/03/2020 3.80 3.50 - 5.20 g/dL Final   09/02/2020 3.50 3.50 - 5.20 g/dL Final   09/01/2020 3.80 3.50 - 5.20 g/dL Final   08/31/2020 3.70 3.50 - 5.20 g/dL Final      Magnesium No results found for: MG   Uric Acid No results found for: URICACID        Results Review:     I reviewed the patient's new clinical results.      amLODIPine 5 mg Oral Q24H   apixaban 5 mg  Oral Q12H   aspirin 81 mg Oral Daily   cholecalciferol 1,000 Units Oral Daily   docusate sodium 100 mg Oral Daily   famotidine 20 mg Oral BID AC   folic acid 1 mg Oral Daily   hydrALAZINE 100 mg Oral Q8H   insulin detemir 10 Units Subcutaneous Q24H   insulin lispro 0-7 Units Subcutaneous TID AC   insulin lispro 5 Units Subcutaneous TID With Meals   metoprolol tartrate 100 mg Oral Q12H   nicotine 1 patch Transdermal Q24H   pharmacy consult - MTM  Does not apply Daily   pravastatin 80 mg Oral Daily   sodium chloride 10 mL Intravenous Q12H   terazosin 10 mg Oral Q12H       dilTIAZem 5-15 mg/hr Last Rate: 5 mg/hr (08/29/20 1900)   niCARdipine 5-15 mg/hr Last Rate: Stopped (08/29/20 1112)       Medication Review: Reviewed    Assessment/Plan    1.  MILKA on CKD stage IV- .Worsening.   2.  CKD stage IV - Hx of CHELITA s/p stent in 2009, Atrophic right kidney and DN. On dialysis in the past.  No renal artery stenosis on the left per duplex scan.  Left kidney 11.4 cm.  3.  Met acidosis: Resolved   4.  Afib w RVR: Stable   5.  Volume status:  Improved   6   DM  7.  Anemia  8.  Hypertension   9- Proteinuria - UPCR 1.1 gm.     Plan:  - No signs and symptoms of uremia - renal function worsening. UOP is adequate. No electrolyte abnormality. No indication of dialysis yet. May be heading towards dialysis. Will challenge with IV fluids today. She is not happy with this situation.   - Monitor I/o   - Avoid nephrotoxic agents  - Adjust meds per renal function     Juliane Rodríguez MD  09/03/20  09:19

## 2020-09-03 NOTE — PROGRESS NOTES
[facility] Medicine Services  PROGRESS NOTE    Patient Name: Johanne Lucas  : 1965  MRN: [id number]    Date of Admission: 2020  Primary Care Physician: Daiana Coyne APRN    Subjective   Subjective     CC: Shortness of breath    HPI:    Patient reports feeling well today.  Denies chest pain, palpitation, nausea, vomiting.  Tolerating diet and watching intake.  Denies shortness of breath, dyspnea on exertion.  Voiding well    Asking to go home today    Review of Systems    Gen- No fevers, chills  CV- No chest pain, palpitations  Resp- No cough, dyspnea  GI- No N/V/D, abd pain        Objective   Objective     Vital Signs:   Temp:  [96.6 °F (35.9 °C)-98.8 °F (37.1 °C)] 96.6 °F (35.9 °C)  Heart Rate:  [59-77] 66  Resp:  [16-20] 18  BP: (140-166)/(55-77) 140/61     Physical Exam:    Constitutional: No acute distress, awake, alert, sitting up in bed  HENT: NCAT, mucous membranes moist  Respiratory: Clear to auscultation bilaterally, respiratory effort normal   Cardiovascular: RRR, no murmurs, rubs, or gallops, palpable pedal pulses bilaterally  Gastrointestinal: Positive bowel sounds, soft, nontender, nondistended  Musculoskeletal: No bilateral ankle edema  Psychiatric: Appropriate affect, cooperative  Neurologic: Oriented x 3, strength symmetric in all extremities, Cranial Nerves grossly intact to confrontation, speech clear  Skin: No rashes      Results Reviewed:  Results from last 7 days   Lab Units 20  0819 20  1140   WBC 10*3/mm3 8.52 9.27   HEMOGLOBIN g/dL 11.5* 11.6*   HEMATOCRIT % 35.9 37.1   PLATELETS 10*3/mm3 188 198     Results from last 7 days   Lab Units 20  0814 20  0819 20  1014   SODIUM mmol/L 135* 137 137   POTASSIUM mmol/L 4.3 4.5 4.5  4.5   CHLORIDE mmol/L 99 101 98   CO2 mmol/L 23.0 25.0 25.0   BUN mg/dL 80* 78* 75*   CREATININE mg/dL 4.45* 4.11* 4.12*   GLUCOSE mg/dL 182* 187* 236*   CALCIUM mg/dL 9.4 9.3 9.5     Estimated  Creatinine Clearance: 18.7 mL/min (A) (by C-G formula based on SCr of 4.45 mg/dL (H)).    Microbiology Results Abnormal     Procedure Component Value - Date/Time    Blood Culture - Blood, Arm, Left [[id number]] Collected:  08/25/20 2118    Lab Status:  Final result Specimen:  Blood from Arm, Left Updated:  08/30/20 2145     Blood Culture No growth at 5 days    Blood Culture - Blood, Arm, Right [[id number]] Collected:  08/25/20 2118    Lab Status:  Final result Specimen:  Blood from Arm, Right Updated:  08/30/20 2145     Blood Culture No growth at 5 days    S. Pneumo Ag Urine or CSF - Urine, Urine, Clean Catch [[id number]]  (Normal) Collected:  08/26/20 0405    Lab Status:  Final result Specimen:  Urine, Clean Catch Updated:  08/26/20 0911     Strep Pneumo Ag Negative    Legionella Antigen, Urine - Urine, Urine, Clean Catch [[id number]]  (Normal) Collected:  08/26/20 0405    Lab Status:  Final result Specimen:  Urine, Clean Catch Updated:  08/26/20 0910     LEGIONELLA ANTIGEN, URINE Negative    Respiratory Culture - Sputum, Cough [[id number]] Collected:  08/26/20 0700    Lab Status:  Final result Specimen:  Sputum from Cough Updated:  08/26/20 0852     Respiratory Culture Rejected     Gram Stain Many (4+) Epithelial cells per low power field      Rare (1+) WBCs per low power field      Moderate (3+) Mixed bacterial morphotypes seen on Gram Stain    Narrative:       Specimen rejected due to oropharyngeal contamination.    Respiratory Panel PCR w/COVID-19(SARS-CoV-2) [facility] In-House, NP Swab in UTM/VTM, 3-4 HR TAT - Swab, Nasopharynx [[id number]]  (Normal) Collected:  08/26/20 0131    Lab Status:  Final result Specimen:  Swab from Nasopharynx Updated:  08/26/20 0309     ADENOVIRUS, PCR Not Detected     Coronavirus 229E Not Detected     Coronavirus HKU1 Not Detected     Coronavirus NL63 Not Detected     Coronavirus OC43 Not Detected     COVID19 Not Detected     Human Metapneumovirus Not Detected     Human  Rhinovirus/Enterovirus Not Detected     Influenza A PCR Not Detected     Influenza A H1 Not Detected     Influenza A H1 2009 PCR Not Detected     Influenza A H3 Not Detected     Influenza B PCR Not Detected     Parainfluenza Virus 1 Not Detected     Parainfluenza Virus 2 Not Detected     Parainfluenza Virus 3 Not Detected     Parainfluenza Virus 4 Not Detected     RSV, PCR Not Detected     Bordetella pertussis pcr Not Detected     Bordetella parapertussis PCR Not Detected     Chlamydophila pneumoniae PCR Not Detected     Mycoplasma pneumo by PCR Not Detected    Narrative:       Fact sheet for providers: https://docs.[employer]/wp-content/uploads/YGC0367-5350-HO4.1-EUA-Provider-Fact-Sheet-3.pdf    Fact sheet for patients: https://docs.[employer]/wp-content/uploads/XPD1653-0628-CZ7.1-EUA-Patient-Fact-Sheet-1.pdf        Imaging Results (Last 24 Hours)     ** No results found for the last 24 hours. **        Results for orders placed during the hospital encounter of 08/25/20   Transthoracic Echo Complete With Contrast if Necessary Per Protocol    Narrative · Left ventricular wall thickness is consistent with mild-to-moderate   concentric hypertrophy.  · Left ventricular systolic function is normal.  · Left ventricular diastolic dysfunction.  · Right ventricular cavity is moderately dilated.  · Left atrial cavity size is mildly dilated.  · Mild mitral valve regurgitation is present  · Mild tricuspid valve regurgitation is present.  · Calculated right ventricular systolic pressure from tricuspid   regurgitation is 31 mmHg.  · Mild pulmonic valve regurgitation is present.  · The aortic valve has a small non mobile echogenic thickening of the L   coronary cusp. This could represent a healed vegetation.        I have reviewed the medications:  Scheduled Meds:    amLODIPine 5 mg Oral Q24H   apixaban 5 mg Oral Q12H   aspirin 81 mg Oral Daily   cholecalciferol 1,000 Units Oral Daily   docusate sodium 100 mg Oral Daily    famotidine 20 mg Oral BID AC   folic acid 1 mg Oral Daily   hydrALAZINE 100 mg Oral Q8H   insulin detemir 10 Units Subcutaneous Q24H   insulin lispro 0-7 Units Subcutaneous TID AC   insulin lispro 5 Units Subcutaneous TID With Meals   metoprolol tartrate 100 mg Oral Q12H   nicotine 1 patch Transdermal Q24H   pharmacy consult - MTM  Does not apply Daily   pravastatin 80 mg Oral Daily   sodium chloride 10 mL Intravenous Q12H   terazosin 10 mg Oral Q12H     Continuous Infusions:    dilTIAZem 5-15 mg/hr Last Rate: 5 mg/hr (08/29/20 1900)   niCARdipine 5-15 mg/hr Last Rate: Stopped (08/29/20 1112)     PRN Meds:.•  acetaminophen **OR** acetaminophen **OR** acetaminophen  •  ALPRAZolam  •  dextrose  •  dextrose  •  glucagon (human recombinant)  •  HYDROcodone-acetaminophen  •  ipratropium-albuterol  •  ipratropium-albuterol  •  melatonin  •  ondansetron  •  potassium chloride  •  potassium chloride  •  sodium chloride    Assessment/Plan   Assessment & Plan     Active Hospital Problems    Diagnosis  POA   • **Atrial fibrillation with RVR (CMS/HCC) [I48.91]  Yes   • Acute respiratory failure with hypoxia (CMS/HCC) [J96.01]  Unknown   • Pneumonia [J18.9]  Unknown   • CHF (congestive heart failure) (CMS/HCC) [I50.9]  Unknown   • HTN (hypertension) [I10]  Unknown   • Anxiety [F41.9]  Unknown   • MILKA (acute kidney injury) (CMS/HCC) [N17.9]  Unknown   • CKD (chronic kidney disease) stage 4, GFR 15-29 ml/min (CMS/HCC) [N18.4]  Unknown   • Macrocytic anemia [D53.9]  Unknown   • Renal artery stenosis s/p stent [I70.1]  Unknown   • COPD (chronic obstructive pulmonary disease) (CMS/HCC) [J44.9]  Unknown   • CAD (coronary artery disease) s/p stent [I25.10]  Unknown   • Tobacco abuse [Z72.0]  Unknown   • Type 2 diabetes mellitus (CMS/[facility]) [E11.9]  Unknown      Resolved Hospital Problems   No resolved problems to display.        Brief Hospital Course to date:  Johanne Lucas is a 55 y.o. female past medical history of CHF, atrial  fibrillation on Eliquis, diabetes, coronary artery disease with stent, COPD, tobacco use, ckd 4 who presented to the ER with acute onset of palpitations, shortness of breath, diaphoresis and chest pain.  The patient states that after waking up from a nap she became short of breath with racing heart and chest pain that radiated into her neck    Adjusting medication for hypertension and renal dysfunction.  Had to de-escalate insulin therapy and bowel regimen.  She says she takes Lactulose at home, but now we are holding    This patient's problems and plans were partially entered by my partner and updated as appropriate by me 09/03/20.    Acute Hypoxic resp failure-resolved    Bilateral pulm infiltrates, favor DHF  -completed empiric antibiotics  -s/p diuresis with Lasix  -Continue with antimicrobial coverage  -Wean oxygen as tolerated  -Elevated BNP - 2,842    Acute on chronic DHF  -s/p diuresis  -stopped lasix 9/1/20 due to rising creatinine    Aortic valve thickening on echo  -per cards    Parox Afib, currently in sinus rhythm  -on eliquis  -Continue rate control    Acute on Chronic kidney disease 4  -Presumed diabetic neuropathy  -Has had dialysis in the past, baseline creatinine ~ 2.8  -creatinine continuing to rise despite discontinuing Lasix  -Dr. Rodríguez following and have discussed today with him.  Currently urine output is good and electrolytes stable. Will attempt IV fluid challenge today.  Possible discharge early tomorrow morning if improvement  -A.m. labs    Diabetes  -Uncontrolled diabetes with hemoglobin A1c 9.2  -Glucose stable, no change today    Hypertension  -controlled currently; cards titrating bp meds      Obesity        DVT Prophylaxis: Apixaban    Disposition: I expect the patient to be discharged home when renal function stable & ok w/ nephrology    CODE STATUS:   Code Status and Medical Interventions:   Ordered at: 08/26/20 0055     Level Of Support Discussed With:    Patient     Code Status:     CPR     Medical Interventions (Level of Support Prior to Arrest):    Full         Electronically signed by LUCI Sierra, 09/03/20, 11:41.

## 2020-09-03 NOTE — PAYOR COMM NOTE
"Ref # [id number]  Nkechi Galdamez RN, BSN  Phone # [phone]  Fax # [phone]  Reza Huizar (55 y.o. Female)     Date of Birth Social Security Number Address Home Phone MRN    1965  [address] [id number] [phone] [id number]    Judaism Marital Status          Judaism        Admission Date Admission Type Admitting Provider Attending Provider Department, Room/Bed    8/25/20 Emergency Dequan Mae MD West, Christopher R, MD [facility] 6A, N620/1    Discharge Date Discharge Disposition Discharge Destination                       Attending Provider:  Dequan Mae MD    Allergies:  Codeine, Sulfa Antibiotics, Nitroglycerin    Isolation:  None   Infection:  None   Code Status:  CPR    Ht:  170 cm (66.93\")   Wt:  113 kg (249 lb 4.8 oz)    Admission Cmt:  None   Principal Problem:  Atrial fibrillation with RVR (CMS/[facility]) [I48.91]                 Active Insurance as of 8/25/2020     Primary Coverage     Payor Plan Insurance Group Employer/Plan Group    [place] [id number]     Payor Plan Address Payor Plan Phone Number Payor Plan Fax Number Effective Dates    [employer] Claims Office - [address] [address] [phone]  4/1/2020 - None Entered    [facility] [id number]       Subscriber Name Subscriber Birth Date Member ID       REZA HUIZAR 1965 [id number]                   Operative/Procedure Notes (last 48 hours) (Notes from 09/01/20 1144 through 09/03/20 1144)    No notes of this type exist for this encounter.          Physician Progress Notes (last 48 hours) (Notes from 09/01/20 1144 through 09/03/20 1144)      Rah Atkins MD at 09/03/20 0914                                                         [facility]       LOS: 9 days   Patient Care Team:  Daiana Coyne APRN as PCP - General (Nurse Practitioner)        Subjective       Patient Denies:  Cp, palpitations.  Breathing ok.  Feels good.  " Wants to go home.      Vital Signs  Temp:  [97 °F (36.1 °C)-98.8 °F (37.1 °C)] 97 °F (36.1 °C)  Heart Rate:  [59-77] 73  Resp:  [16-20] 16  BP: (143-166)/(55-77) 166/77    Intake/Output Summary (Last 24 hours) at 9/3/2020 0914  Last data filed at 9/3/2020 0700  Gross per 24 hour   Intake 960 ml   Output 1640 ml   Net -680 ml     No intake/output data recorded.    Physical Exam:     General Appearance:    Alert, cooperative, in no acute distress       Neck:   No adenopathy, supple, trachea midline, no thyromegaly, no JVD   Lungs:     Decreased to auscultation,respirations regular, even and                unlabored    Heart:    Regular rhythm and normal rate, normal S1 and S2, no         murmur, no gallop, no rub, no click   Chest Wall:    No abnormalities observed   Abdomen:     Normal bowel sounds, no masses, no organomegaly, soft     nontender, nondistended   Extremities:   Moves all extremities well, + edema, no cyanosis, no           redness   Pulses:   Pulses palpable and equal bilaterally     Results Review:     I reviewed the patient's new clinical results.      WBC WBC   Date/Time Value Ref Range Status   09/02/2020 0819 8.52 3.40 - 10.80 10*3/mm3 Final   08/31/2020 1140 9.27 3.40 - 10.80 10*3/mm3 Final            HGB Hemoglobin   Date/Time Value Ref Range Status   09/02/2020 0819 11.5 (L) 12.0 - 15.9 g/dL Final   08/31/2020 1140 11.6 (L) 12.0 - 15.9 g/dL Final           HCT Hematocrit   Date/Time Value Ref Range Status   09/02/2020 0819 35.9 34.0 - 46.6 % Final   08/31/2020 1140 37.1 34.0 - 46.6 % Final            Platelets Platelets   Date/Time Value Ref Range Status   09/02/2020 0819 188 140 - 450 10*3/mm3 Final   08/31/2020 1140 198 140 - 450 10*3/mm3 Final     Sodium  Sodium   Date/Time Value Ref Range Status   09/03/2020 0814 135 (L) 136 - 145 mmol/L Final   09/02/2020 0819 137 136 - 145 mmol/L Final   09/01/2020 1014 137 136 - 145 mmol/L Final   08/31/2020 1140 140 136 - 145 mmol/L Final      Potassium  Potassium   Date/Time Value Ref Range Status   09/03/2020 0814 4.3 3.5 - 5.2 mmol/L Final   09/02/2020 0819 4.5 3.5 - 5.2 mmol/L Final   09/01/2020 1014 4.5 3.5 - 5.2 mmol/L Final   09/01/2020 1014 4.5 3.5 - 5.2 mmol/L Final   08/31/2020 1140 3.4 (L) 3.5 - 5.2 mmol/L Final     Comment:     Slight hemolysis detected by analyzer. Results may be affected.     Chloride  Chloride   Date/Time Value Ref Range Status   09/03/2020 0814 99 98 - 107 mmol/L Final   09/02/2020 0819 101 98 - 107 mmol/L Final   09/01/2020 1014 98 98 - 107 mmol/L Final   08/31/2020 1140 97 (L) 98 - 107 mmol/L Final     BicarbonateNo results found for: PLASMABICARB    BUN BUN   Date/Time Value Ref Range Status   09/03/2020 0814 80 (H) 6 - 20 mg/dL Final   09/02/2020 0819 78 (H) 6 - 20 mg/dL Final   09/01/2020 1014 75 (H) 6 - 20 mg/dL Final   08/31/2020 1140 70 (H) 6 - 20 mg/dL Final      Creatinine Creatinine   Date/Time Value Ref Range Status   09/03/2020 0814 4.45 (H) 0.57 - 1.00 mg/dL Final   09/02/2020 0819 4.11 (H) 0.57 - 1.00 mg/dL Final   09/01/2020 1014 4.12 (H) 0.57 - 1.00 mg/dL Final   08/31/2020 1140 3.83 (H) 0.57 - 1.00 mg/dL Final      Calcium Calcium   Date/Time Value Ref Range Status   09/03/2020 0814 9.4 8.6 - 10.5 mg/dL Final   09/02/2020 0819 9.3 8.6 - 10.5 mg/dL Final   09/01/2020 1014 9.5 8.6 - 10.5 mg/dL Final   08/31/2020 1140 9.5 8.6 - 10.5 mg/dL Final      Mag @RESULFAST(MG:3)@        PT/INR:       Lab Results   Component Value Date    PROTIME 14.9 (H) 08/25/2020    INR 1.20 (H) 08/25/2020      Troponin I:  No results found for: TROPONINI   Lab Results   Component Value Date    TROPONINT 0.092 (C) 08/26/2020         amLODIPine 5 mg Oral Q24H   apixaban 5 mg Oral Q12H   aspirin 81 mg Oral Daily   cholecalciferol 1,000 Units Oral Daily   docusate sodium 100 mg Oral Daily   famotidine 20 mg Oral BID AC   folic acid 1 mg Oral Daily   hydrALAZINE 100 mg Oral Q8H   insulin detemir 10 Units Subcutaneous Q24H   insulin  lispro 0-7 Units Subcutaneous TID AC   insulin lispro 5 Units Subcutaneous TID With Meals   metoprolol tartrate 100 mg Oral Q12H   nicotine 1 patch Transdermal Q24H   pharmacy consult - MTM  Does not apply Daily   pravastatin 80 mg Oral Daily   sodium chloride 10 mL Intravenous Q12H   terazosin 10 mg Oral Q12H       dilTIAZem 5-15 mg/hr Last Rate: 5 mg/hr (20 1900)   niCARdipine 5-15 mg/hr Last Rate: Stopped (20 1112)       Assessment/Plan     Patient Active Problem List   Diagnosis Code   • Atrial fibrillation with RVR (CMS/HCC) I48.91   • Acute respiratory failure with hypoxia (CMS/HCC) J96.01   • Pneumonia J18.9   • CHF (congestive heart failure) (CMS/HCC) I50.9   • HTN (hypertension) I10   • Anxiety F41.9   • MILKA (acute kidney injury) (CMS/HCC) N17.9   • CKD (chronic kidney disease) stage 4, GFR 15-29 ml/min (CMS/HCC) N18.4   • Macrocytic anemia D53.9   • Renal artery stenosis s/p stent I70.1   • COPD (chronic obstructive pulmonary disease) (CMS/HCC) J44.9   • CAD (coronary artery disease) s/p stent I25.10   • Tobacco abuse Z72.0   • Type 2 diabetes mellitus (CMS/HCC) E11.9     Hypertension   Normal EF  Paroxysmal atrial fibrillation maintaining sinus rhythm  Chronic kidney disease; increase in creatinine yesterday and again today  COPD      Continue anticoagulation  Worsening renal function.  Renal following  Per Renal      MD Michael Hubbard PA  20  09:14          Electronically signed by Rah Atkins MD at 20 1010     Dequan Mae MD at 20 1457              [facility] Medicine Services  PROGRESS NOTE    Patient Name: Johanne Lucas  : 1965  MRN: [id number]    Date of Admission: 2020  Primary Care Physician: Daiana Coyne APRN    Subjective   Subjective     CC: Shortness of breath    HPI:    No dyspnea. No chest pain, no palpitations. No abd pain    Review of Systems    No headache, no  rash    Objective   Objective     Vital Signs:   Temp:  [97.7 °F (36.5 °C)-98.8 °F (37.1 °C)] 98.8 °F (37.1 °C)  Heart Rate:  [69-86] 69  Resp:  [16-24] 18  BP: (133-166)/(57-69) 166/64     Physical Exam:    Constitutional:Alert, oriented x 3, nontoxic appearing  Psych:Normal/appropriate affect  HEENT:Ncat, oroph clear  Neck: neck supple, full range of motion  Neuro: Face symmetric, speech clear, equal , moves all extremities  Cardiac: Rrr; No pretibial pitting edema  Resp: Ctab, normal effort  GI: abd soft, nontender, obese, +bs  Skin: No extremity rash  Musculoskeletal/extremities: no cyanosis extremities; no significant ankle edema        Results Reviewed:  Results from last 7 days   Lab Units 09/02/20  0819 08/31/20  1140 08/27/20  0344   WBC 10*3/mm3 8.52 9.27 11.20*   HEMOGLOBIN g/dL 11.5* 11.6* 10.3*   HEMATOCRIT % 35.9 37.1 33.5*   PLATELETS 10*3/mm3 188 198 184     Results from last 7 days   Lab Units 09/02/20  0819 09/01/20  1014 08/31/20  1140   SODIUM mmol/L 137 137 140   POTASSIUM mmol/L 4.5 4.5  4.5 3.4*   CHLORIDE mmol/L 101 98 97*   CO2 mmol/L 25.0 25.0 27.0   BUN mg/dL 78* 75* 70*   CREATININE mg/dL 4.11* 4.12* 3.83*   GLUCOSE mg/dL 187* 236* 90   CALCIUM mg/dL 9.3 9.5 9.5     Estimated Creatinine Clearance: 20.2 mL/min (A) (by C-G formula based on SCr of 4.11 mg/dL (H)).    Microbiology Results Abnormal     Procedure Component Value - Date/Time    Blood Culture - Blood, Arm, Left [[id number]] Collected:  08/25/20 2118    Lab Status:  Final result Specimen:  Blood from Arm, Left Updated:  08/30/20 2145     Blood Culture No growth at 5 days    Blood Culture - Blood, Arm, Right [[id number]] Collected:  08/25/20 2118    Lab Status:  Final result Specimen:  Blood from Arm, Right Updated:  08/30/20 2145     Blood Culture No growth at 5 days    S. Pneumo Ag Urine or CSF - Urine, Urine, Clean Catch [[id number]]  (Normal) Collected:  08/26/20 0405    Lab Status:  Final result Specimen:  Urine, Clean  Catch Updated:  08/26/20 0911     Strep Pneumo Ag Negative    Legionella Antigen, Urine - Urine, Urine, Clean Catch [[id number]]  (Normal) Collected:  08/26/20 0405    Lab Status:  Final result Specimen:  Urine, Clean Catch Updated:  08/26/20 0910     LEGIONELLA ANTIGEN, URINE Negative    Respiratory Culture - Sputum, Cough [[id number]] Collected:  08/26/20 0700    Lab Status:  Final result Specimen:  Sputum from Cough Updated:  08/26/20 0852     Respiratory Culture Rejected     Gram Stain Many (4+) Epithelial cells per low power field      Rare (1+) WBCs per low power field      Moderate (3+) Mixed bacterial morphotypes seen on Gram Stain    Narrative:       Specimen rejected due to oropharyngeal contamination.    Respiratory Panel PCR w/COVID-19(SARS-CoV-2) [facility]MAN/[facility] In-House, NP Swab in UTM/VTM, 3-4 HR TAT - Swab, Nasopharynx [[id number]]  (Normal) Collected:  08/26/20 0131    Lab Status:  Final result Specimen:  Swab from Nasopharynx Updated:  08/26/20 0309     ADENOVIRUS, PCR Not Detected     Coronavirus 229E Not Detected     Coronavirus HKU1 Not Detected     Coronavirus NL63 Not Detected     Coronavirus OC43 Not Detected     COVID19 Not Detected     Human Metapneumovirus Not Detected     Human Rhinovirus/Enterovirus Not Detected     Influenza A PCR Not Detected     Influenza A H1 Not Detected     Influenza A H1 2009 PCR Not Detected     Influenza A H3 Not Detected     Influenza B PCR Not Detected     Parainfluenza Virus 1 Not Detected     Parainfluenza Virus 2 Not Detected     Parainfluenza Virus 3 Not Detected     Parainfluenza Virus 4 Not Detected     RSV, PCR Not Detected     Bordetella pertussis pcr Not Detected     Bordetella parapertussis PCR Not Detected     Chlamydophila pneumoniae PCR Not Detected     Mycoplasma pneumo by PCR Not Detected    Narrative:       Fact sheet for providers: https://docs.Byliner/wp-content/uploads/LVT8574-6453-KN1.1-EUA-Provider-Fact-Sheet-3.pdf    Fact sheet for  patients: https://docs.DesignLine/wp-content/uploads/NIS7495-2769-IQ3.1-EUA-Patient-Fact-Sheet-1.pdf        Imaging Results (Last 24 Hours)     ** No results found for the last 24 hours. **        Results for orders placed during the hospital encounter of 08/25/20   Transthoracic Echo Complete With Contrast if Necessary Per Protocol    Narrative · Left ventricular wall thickness is consistent with mild-to-moderate   concentric hypertrophy.  · Left ventricular systolic function is normal.  · Left ventricular diastolic dysfunction.  · Right ventricular cavity is moderately dilated.  · Left atrial cavity size is mildly dilated.  · Mild mitral valve regurgitation is present  · Mild tricuspid valve regurgitation is present.  · Calculated right ventricular systolic pressure from tricuspid   regurgitation is 31 mmHg.  · Mild pulmonic valve regurgitation is present.  · The aortic valve has a small non mobile echogenic thickening of the L   coronary cusp. This could represent a healed vegetation.        I have reviewed the medications:  Scheduled Meds:    amLODIPine 5 mg Oral Q24H   apixaban 5 mg Oral Q12H   aspirin 81 mg Oral Daily   cholecalciferol 1,000 Units Oral Daily   docusate sodium 100 mg Oral Daily   famotidine 20 mg Oral BID AC   folic acid 1 mg Oral Daily   hydrALAZINE 100 mg Oral Q8H   insulin detemir 10 Units Subcutaneous Q24H   insulin lispro 0-7 Units Subcutaneous TID AC   insulin lispro 5 Units Subcutaneous TID With Meals   metoprolol tartrate 100 mg Oral Q12H   nicotine 1 patch Transdermal Q24H   pharmacy consult - MTM  Does not apply Daily   pravastatin 80 mg Oral Daily   sodium chloride 10 mL Intravenous Q12H   terazosin 10 mg Oral Q12H     Continuous Infusions:    dilTIAZem 5-15 mg/hr Last Rate: 5 mg/hr (08/29/20 1900)   niCARdipine 5-15 mg/hr Last Rate: Stopped (08/29/20 1112)     PRN Meds:.•  acetaminophen **OR** acetaminophen **OR** acetaminophen  •  ALPRAZolam  •  dextrose  •  dextrose  •   glucagon (human recombinant)  •  HYDROcodone-acetaminophen  •  ipratropium-albuterol  •  ipratropium-albuterol  •  melatonin  •  ondansetron  •  potassium chloride  •  potassium chloride  •  sodium chloride    Assessment/Plan   Assessment & Plan     Active Hospital Problems    Diagnosis  POA   • **Atrial fibrillation with RVR (CMS/HCC) [I48.91]  Yes   • Acute respiratory failure with hypoxia (CMS/HCC) [J96.01]  Unknown   • Pneumonia [J18.9]  Unknown   • CHF (congestive heart failure) (CMS/HCC) [I50.9]  Unknown   • HTN (hypertension) [I10]  Unknown   • Anxiety [F41.9]  Unknown   • MILKA (acute kidney injury) (CMS/HCC) [N17.9]  Unknown   • CKD (chronic kidney disease) stage 4, GFR 15-29 ml/min (CMS/HCC) [N18.4]  Unknown   • Macrocytic anemia [D53.9]  Unknown   • Renal artery stenosis s/p stent [I70.1]  Unknown   • COPD (chronic obstructive pulmonary disease) (CMS/HCC) [J44.9]  Unknown   • CAD (coronary artery disease) s/p stent [I25.10]  Unknown   • Tobacco abuse [Z72.0]  Unknown   • Type 2 diabetes mellitus (CMS/HCC) [E11.9]  Unknown      Resolved Hospital Problems   No resolved problems to display.        Brief Hospital Course to date:  Johanne Lucas is a 55 y.o. female past medical history of CHF, atrial fibrillation on Eliquis, diabetes, coronary artery disease with stent, COPD, tobacco use, ckd 4 who presented to the ER with acute onset of palpitations, shortness of breath, diaphoresis and chest pain.  The patient states that after waking up from a nap she became short of breath with racing heart and chest pain that radiated into her neck    Adjusting medication for hypertension and renal dysfunction.  Had to de-escalate insulin therapy and bowel regimen.  She says she takes Lactulose at home, but now we are holding    This patient's problems and plans were partially entered by my partner and updated as appropriate by me 09/02/20. Today is my first day evaluating this patients active medical problems. I  Personally reviewed chart and adjusted note to reflect daily changes in management/clinical condition          Acute Hypoxic resp failure    Bilateral pulm infiltrates, favor DHF  -completed empiric antibiotics  -s/p diuresis  -Continue with antimicrobial coverage  -Wean oxygen as tolerated  -Elevated BNP - 2,842    Acute on chronic DHF  -s/p diuresis  -stopped lasix 9/1/20 due to rising creatinine    Aortic valve thickening on echo  -per cards    Parox Afib, currently in sinus rhythm  -on eliquis    Acute on Chronic kidney disease 4  -Presumed diabetic neuropathy  -Has had dialysis in the past  -creatinine 2.8--> 3.8-->4.1-->4.1 today  -stopped lasix 9/1/20  -nephrology following    Diabetes  -Uncontrolled diabetes with hemoglobin A1c 9.2  -Adjust insulins prn    Hypertension  -controlled currently; cards titrating bp meds      Obesity    --------------------------------------------------------    Plan:  -renal function stable today (4.1). Stopped lasix (last dose 9/1 a.m.) nephrology following. Respiratory status good currently. Monitor volume status & renal function. Home when renal function improved & ok w/ nephrology (final diuretic dosing per nephrology)    -room air sats ok while awake currently, likely need night oxygen    -continue rate control per cards; continue eliquis for now (d/w pharmacy) for afib    Am labs: bmp    DVT Prophylaxis: Apixaban    Disposition: I expect the patient to be discharged home when renal function stable & ok w/ consultants    CODE STATUS:   Code Status and Medical Interventions:   Ordered at: 08/26/20 0055     Level Of Support Discussed With:    Patient     Code Status:    CPR     Medical Interventions (Level of Support Prior to Arrest):    Full         Electronically signed by Dequan Mae MD, 09/02/20, 14:57.      Electronically signed by Dequan Mae MD at 09/02/20 1504     Marcos, Juliane Bowers MD at 09/02/20 1128             LOS: 8 days    Patient Care  "Team:  Daiana Coyne APRN as PCP - General (Nurse Practitioner)    Chief Complaint: MILKA on CKD stage III and IV    Subjective   Interval History:   No new events no new complaints. Want to go home.   Review of Systems:   Denies N/V, CP or SOA    Objective     Vital Sign Min/Max for last 24 hours  Temp  Min: 97.7 °F (36.5 °C)  Max: 98.3 °F (36.8 °C)   BP  Min: 133/57  Max: 164/65   Pulse  Min: 69  Max: 86   Resp  Min: 16  Max: 24   SpO2  Min: 93 %  Max: 98 %   No data recorded   Weight  Min: 112 kg (248 lb)  Max: 113 kg (248 lb 14.4 oz)     Flowsheet Rows      First Filed Value   Admission Height  165.1 cm (65\") Documented at 08/25/2020 2110   Admission Weight  113 kg (250 lb) Documented at 08/25/2020 2110          No intake/output data recorded.  I/O last 3 completed shifts:  In: 540 [P.O.:540]  Out: 1000 [Urine:1000]    Physical Exam:  General Appearance: Alert oriented x3 , NAD  Eyes: normal conjunctiva, EOMI.  Neck: Supple no JVD.  Lungs: Clear auscultation,  nonlabored.  Heart: No gallop, murmur, rub, RRR.  Abdomen: Soft, nontender, positive bowel sounds, ND  Extremities: No edema, no cyanosis.  Neuro: No focal deficit, moving all extremities  AV fistula right upper arm.  Functional    WBC WBC   Date Value Ref Range Status   09/02/2020 8.52 3.40 - 10.80 10*3/mm3 Final   08/31/2020 9.27 3.40 - 10.80 10*3/mm3 Final      HGB Hemoglobin   Date Value Ref Range Status   09/02/2020 11.5 (L) 12.0 - 15.9 g/dL Final   08/31/2020 11.6 (L) 12.0 - 15.9 g/dL Final      HCT Hematocrit   Date Value Ref Range Status   09/02/2020 35.9 34.0 - 46.6 % Final   08/31/2020 37.1 34.0 - 46.6 % Final      Platlets No results found for: LABPLAT   MCV MCV   Date Value Ref Range Status   09/02/2020 96.5 79.0 - 97.0 fL Final   08/31/2020 94.4 79.0 - 97.0 fL Final          Sodium Sodium   Date Value Ref Range Status   09/02/2020 137 136 - 145 mmol/L Final   09/01/2020 137 136 - 145 mmol/L Final   08/31/2020 140 136 - 145 mmol/L Final    "   Potassium Potassium   Date Value Ref Range Status   09/02/2020 4.5 3.5 - 5.2 mmol/L Final   09/01/2020 4.5 3.5 - 5.2 mmol/L Final   09/01/2020 4.5 3.5 - 5.2 mmol/L Final   08/31/2020 3.4 (L) 3.5 - 5.2 mmol/L Final     Comment:     Slight hemolysis detected by analyzer. Results may be affected.      Chloride Chloride   Date Value Ref Range Status   09/02/2020 101 98 - 107 mmol/L Final   09/01/2020 98 98 - 107 mmol/L Final   08/31/2020 97 (L) 98 - 107 mmol/L Final      CO2 CO2   Date Value Ref Range Status   09/02/2020 25.0 22.0 - 29.0 mmol/L Final   09/01/2020 25.0 22.0 - 29.0 mmol/L Final   08/31/2020 27.0 22.0 - 29.0 mmol/L Final      BUN BUN   Date Value Ref Range Status   09/02/2020 78 (H) 6 - 20 mg/dL Final   09/01/2020 75 (H) 6 - 20 mg/dL Final   08/31/2020 70 (H) 6 - 20 mg/dL Final      Creatinine Creatinine   Date Value Ref Range Status   09/02/2020 4.11 (H) 0.57 - 1.00 mg/dL Final   09/01/2020 4.12 (H) 0.57 - 1.00 mg/dL Final   08/31/2020 3.83 (H) 0.57 - 1.00 mg/dL Final      Calcium Calcium   Date Value Ref Range Status   09/02/2020 9.3 8.6 - 10.5 mg/dL Final   09/01/2020 9.5 8.6 - 10.5 mg/dL Final   08/31/2020 9.5 8.6 - 10.5 mg/dL Final      PO4 No results found for: CAPO4   Albumin Albumin   Date Value Ref Range Status   09/02/2020 3.50 3.50 - 5.20 g/dL Final   09/01/2020 3.80 3.50 - 5.20 g/dL Final   08/31/2020 3.70 3.50 - 5.20 g/dL Final      Magnesium No results found for: MG   Uric Acid No results found for: URICACID        Results Review:     I reviewed the patient's new clinical results.      amLODIPine 5 mg Oral Q24H   apixaban 5 mg Oral Q12H   aspirin 81 mg Oral Daily   cholecalciferol 1,000 Units Oral Daily   docusate sodium 100 mg Oral Daily   famotidine 20 mg Oral BID AC   folic acid 1 mg Oral Daily   hydrALAZINE 100 mg Oral Q8H   insulin detemir 10 Units Subcutaneous Q24H   insulin lispro 0-7 Units Subcutaneous TID AC   insulin lispro 5 Units Subcutaneous TID With Meals   metoprolol tartrate  100 mg Oral Q12H   nicotine 1 patch Transdermal Q24H   pharmacy consult - MTM  Does not apply Daily   pravastatin 80 mg Oral Daily   sodium chloride 10 mL Intravenous Q12H   terazosin 10 mg Oral Q12H       dilTIAZem 5-15 mg/hr Last Rate: 5 mg/hr (08/29/20 1900)   niCARdipine 5-15 mg/hr Last Rate: Stopped (08/29/20 1112)       Medication Review: Reviewed    Assessment/Plan    1.  MILKA on CKD stage IV- . Secondary to diuresis. Stable.   2.  CKD stage IV - Hx of CHELITA s/p stent in 2009, Atrophic right kidney and DN. On dialysis in the past.  No renal artery stenosis on the left per duplex scan.  Left kidney 11.4 cm.  3.  Met acidosis: Resolved   4.  Afib w RVR: Stable   5.  Volume status:  Improved   6   DM  7.  Anemia  8.  Hypertension   9- Proteinuria - UPCR 1.1 gm.     Plan:  - Continue to hold diuretics.   - Monitor I/o   - Avoid nephrotoxic agents  - Adjust meds per renal function   - No need of RRT.     Juliane Rodríguez MD  09/02/20  11:25       Electronically signed by Juliane Rodríguez MD at 09/02/20 1126     Rah Atkins MD at 09/02/20 0925                                                         [facility]       LOS: 8 days   Patient Care Team:  Daiana Coyne APRN as PCP - General (Nurse Practitioner)        Subjective       Patient Denies:  Cp, palpitations.  Breathing ok.  Feels good.  Wants to go home.      Vital Signs  Temp:  [97.7 °F (36.5 °C)-98.3 °F (36.8 °C)] 97.7 °F (36.5 °C)  Heart Rate:  [68-86] 75  Resp:  [16-24] 18  BP: (133-164)/(57-69) 155/69    Intake/Output Summary (Last 24 hours) at 9/2/2020 0925  Last data filed at 9/2/2020 0410  Gross per 24 hour   Intake 540 ml   Output 1000 ml   Net -460 ml     No intake/output data recorded.    Physical Exam:     General Appearance:    Alert, cooperative, in no acute distress       Neck:   No adenopathy, supple, trachea midline, no thyromegaly, no JVD   Lungs:     Decreased to auscultation,respirations regular, even and                 unlabored    Heart:    Regular rhythm and normal rate, normal S1 and S2, no         murmur, no gallop, no rub, no click   Chest Wall:    No abnormalities observed   Abdomen:     Normal bowel sounds, no masses, no organomegaly, soft     nontender, nondistended   Extremities:   Moves all extremities well, + edema, no cyanosis, no           redness   Pulses:   Pulses palpable and equal bilaterally     Results Review:     I reviewed the patient's new clinical results.      WBC WBC   Date/Time Value Ref Range Status   09/02/2020 0819 8.52 3.40 - 10.80 10*3/mm3 Final   08/31/2020 1140 9.27 3.40 - 10.80 10*3/mm3 Final            HGB Hemoglobin   Date/Time Value Ref Range Status   09/02/2020 0819 11.5 (L) 12.0 - 15.9 g/dL Final   08/31/2020 1140 11.6 (L) 12.0 - 15.9 g/dL Final           HCT Hematocrit   Date/Time Value Ref Range Status   09/02/2020 0819 35.9 34.0 - 46.6 % Final   08/31/2020 1140 37.1 34.0 - 46.6 % Final            Platelets Platelets   Date/Time Value Ref Range Status   09/02/2020 0819 188 140 - 450 10*3/mm3 Final   08/31/2020 1140 198 140 - 450 10*3/mm3 Final     Sodium  Sodium   Date/Time Value Ref Range Status   09/01/2020 1014 137 136 - 145 mmol/L Final   08/31/2020 1140 140 136 - 145 mmol/L Final     Potassium  Potassium   Date/Time Value Ref Range Status   09/01/2020 1014 4.5 3.5 - 5.2 mmol/L Final   09/01/2020 1014 4.5 3.5 - 5.2 mmol/L Final   08/31/2020 1140 3.4 (L) 3.5 - 5.2 mmol/L Final     Comment:     Slight hemolysis detected by analyzer. Results may be affected.     Chloride  Chloride   Date/Time Value Ref Range Status   09/01/2020 1014 98 98 - 107 mmol/L Final   08/31/2020 1140 97 (L) 98 - 107 mmol/L Final     BicarbonateNo results found for: PLASMABICARB    BUN BUN   Date/Time Value Ref Range Status   09/01/2020 1014 75 (H) 6 - 20 mg/dL Final   08/31/2020 1140 70 (H) 6 - 20 mg/dL Final      Creatinine Creatinine   Date/Time Value Ref Range Status   09/01/2020 1014 4.12 (H)  0.57 - 1.00 mg/dL Final   08/31/2020 1140 3.83 (H) 0.57 - 1.00 mg/dL Final      Calcium Calcium   Date/Time Value Ref Range Status   09/01/2020 1014 9.5 8.6 - 10.5 mg/dL Final   08/31/2020 1140 9.5 8.6 - 10.5 mg/dL Final      Mag @RESULFAST(MG:3)@        PT/INR:       Lab Results   Component Value Date    PROTIME 14.9 (H) 08/25/2020    INR 1.20 (H) 08/25/2020      Troponin I:  No results found for: TROPONINI   Lab Results   Component Value Date    TROPONINT 0.092 (C) 08/26/2020         amLODIPine 5 mg Oral Q24H   apixaban 5 mg Oral Q12H   aspirin 81 mg Oral Daily   cholecalciferol 1,000 Units Oral Daily   docusate sodium 100 mg Oral Daily   famotidine 20 mg Oral BID AC   folic acid 1 mg Oral Daily   hydrALAZINE 100 mg Oral Q8H   insulin detemir 10 Units Subcutaneous Q24H   insulin lispro 0-7 Units Subcutaneous TID AC   insulin lispro 5 Units Subcutaneous TID With Meals   metoprolol tartrate 100 mg Oral Q12H   nicotine 1 patch Transdermal Q24H   pharmacy consult - MTM  Does not apply Daily   pravastatin 80 mg Oral Daily   sodium chloride 10 mL Intravenous Q12H   terazosin 5 mg Oral Q12H       dilTIAZem 5-15 mg/hr Last Rate: 5 mg/hr (08/29/20 1900)   niCARdipine 5-15 mg/hr Last Rate: Stopped (08/29/20 1112)       Assessment/Plan     Patient Active Problem List   Diagnosis Code   • Atrial fibrillation with RVR (CMS/[facility]) I48.91   • Acute respiratory failure with hypoxia (CMS/[facility]) J96.01   • Pneumonia J18.9   • CHF (congestive heart failure) (CMS/[facility]) I50.9   • HTN (hypertension) I10   • Anxiety F41.9   • MILKA (acute kidney injury) (CMS/[facility]) N17.9   • CKD (chronic kidney disease) stage 4, GFR 15-29 ml/min (CMS/HCC) N18.4   • Macrocytic anemia D53.9   • Renal artery stenosis s/p stent I70.1   • COPD (chronic obstructive pulmonary disease) (CMS/[facility]) J44.9   • CAD (coronary artery disease) s/p stent I25.10   • Tobacco abuse Z72.0   • Type 2 diabetes mellitus (CMS/HCC) E11.9     Hypertension   Normal EF  Paroxysmal atrial  "fibrillation maintaining sinus rhythm  Chronic kidney disease; increase in creatinine yesterday and again today  COPD      Continue anticoagulation  Renal following  Hopefully home soon when renal function stabilizes  Increase bp meds  Okay from cardiology standpoint    MD Michael Hubbard PA  20  09:25          Electronically signed by Rah Atkins MD at 20 0953     Dequan Mae MD at 20 1152              [facility] Medicine Services  PROGRESS NOTE    Patient Name: Johanne Lucas  : 1965  MRN: [id number]    Date of Admission: 2020  Primary Care Physician: Daiana Coyne APRN    Subjective   Subjective     CC: Shortness of breath    HPI:   Breathing \"fine\". No chest pain. No bleeding. No fever. No chest pain    Review of Systems    No headache, no rash    Objective   Objective     Vital Signs:   Temp:  [96.9 °F (36.1 °C)-98 °F (36.7 °C)] 97.8 °F (36.6 °C)  Heart Rate:  [68-78] 68  Resp:  [18-24] 18  BP: (138-162)/(58-73) 138/64     Physical Exam:    Constitutional: No acute distress, awake, alert, nontoxic appearing  HENT: NCAT, neck supple  Respiratory: Clear to auscultation bilaterally, respiratory effort normal   Cardiovascular: RRR, s1 and s2  Gastrointestinal: Positive bowel sounds, soft, nontender, nondistended  Musculoskeletal: No bilateral ankle edema  Psychiatric: Appropriate affect, cooperative  Neurologic: Oriented x 3, strength symmetric in all extremities, Cranial Nerves grossly intact to confrontation, speech clear  Skin: No rashes    Results Reviewed:  Results from last 7 days   Lab Units 20  1140 20  0344 20  0531 20  2253 20  2118   WBC 10*3/mm3 9.27 11.20* 8.93  --  10.25   HEMOGLOBIN g/dL 11.6* 10.3* 10.9*  --  11.1*   HEMATOCRIT % 37.1 33.5* 35.8  --  36.7   PLATELETS 10*3/mm3 198 184 177  --  199   INR   --   --   --   --  1.20*   PROCALCITONIN ng/mL  --   --   --  0.14  " --      Results from last 7 days   Lab Units 09/01/20  1014 08/31/20  1140 08/29/20  0752  08/26/20  0854 08/26/20  0531 08/25/20  2253   SODIUM mmol/L 137 140 137   < >  --  138 138   POTASSIUM mmol/L 4.5  4.5 3.4* 3.2*   < >  --  5.3* 4.5   CHLORIDE mmol/L 98 97* 97*   < >  --  105 106   CO2 mmol/L 25.0 27.0 28.0   < >  --  21.0* 21.0*   BUN mg/dL 75* 70* 65*   < >  --  47* 45*   CREATININE mg/dL 4.12* 3.83* 2.82*   < >  --  3.04* 2.96*   GLUCOSE mg/dL 236* 90 161*   < >  --  349* 266*   CALCIUM mg/dL 9.5 9.5 9.7   < >  --  9.2 9.8   ALT (SGPT) U/L  --   --   --   --   --   --  15   AST (SGOT) U/L  --   --   --   --   --   --  23   TROPONIN T ng/mL  --   --   --   --  0.092* 0.058* <0.010   PROBNP pg/mL  --   --   --   --   --   --  2,842.0*    < > = values in this interval not displayed.     Estimated Creatinine Clearance: 20.3 mL/min (A) (by C-G formula based on SCr of 4.12 mg/dL (H)).    Microbiology Results Abnormal     Procedure Component Value - Date/Time    Blood Culture - Blood, Arm, Left [[id number]] Collected:  08/25/20 2118    Lab Status:  Final result Specimen:  Blood from Arm, Left Updated:  08/30/20 2145     Blood Culture No growth at 5 days    Blood Culture - Blood, Arm, Right [[id number]] Collected:  08/25/20 2118    Lab Status:  Final result Specimen:  Blood from Arm, Right Updated:  08/30/20 2145     Blood Culture No growth at 5 days    S. Pneumo Ag Urine or CSF - Urine, Urine, Clean Catch [[id number]]  (Normal) Collected:  08/26/20 0405    Lab Status:  Final result Specimen:  Urine, Clean Catch Updated:  08/26/20 0911     Strep Pneumo Ag Negative    Legionella Antigen, Urine - Urine, Urine, Clean Catch [[id number]]  (Normal) Collected:  08/26/20 0405    Lab Status:  Final result Specimen:  Urine, Clean Catch Updated:  08/26/20 0910     LEGIONELLA ANTIGEN, URINE Negative    Respiratory Culture - Sputum, Cough [[id number]] Collected:  08/26/20 0700    Lab Status:  Final result Specimen:  Sputum from  Cough Updated:  08/26/20 0852     Respiratory Culture Rejected     Gram Stain Many (4+) Epithelial cells per low power field      Rare (1+) WBCs per low power field      Moderate (3+) Mixed bacterial morphotypes seen on Gram Stain    Narrative:       Specimen rejected due to oropharyngeal contamination.    Respiratory Panel PCR w/COVID-19(SARS-CoV-2) [facility] In-House, NP Swab in UTM/VTM, 3-4 HR TAT - Swab, Nasopharynx [[id number]]  (Normal) Collected:  08/26/20 0131    Lab Status:  Final result Specimen:  Swab from Nasopharynx Updated:  08/26/20 0309     ADENOVIRUS, PCR Not Detected     Coronavirus 229E Not Detected     Coronavirus HKU1 Not Detected     Coronavirus NL63 Not Detected     Coronavirus OC43 Not Detected     COVID19 Not Detected     Human Metapneumovirus Not Detected     Human Rhinovirus/Enterovirus Not Detected     Influenza A PCR Not Detected     Influenza A H1 Not Detected     Influenza A H1 2009 PCR Not Detected     Influenza A H3 Not Detected     Influenza B PCR Not Detected     Parainfluenza Virus 1 Not Detected     Parainfluenza Virus 2 Not Detected     Parainfluenza Virus 3 Not Detected     Parainfluenza Virus 4 Not Detected     RSV, PCR Not Detected     Bordetella pertussis pcr Not Detected     Bordetella parapertussis PCR Not Detected     Chlamydophila pneumoniae PCR Not Detected     Mycoplasma pneumo by PCR Not Detected    Narrative:       Fact sheet for providers: https://docs.[employer]/wp-content/uploads/LQX8175-3312-VA3.1-EUA-Provider-Fact-Sheet-3.pdf    Fact sheet for patients: https://docs.[employer]/wp-content/uploads/VBN8057-4717-ZB8.1-EUA-Patient-Fact-Sheet-1.pdf        Imaging Results (Last 24 Hours)     ** No results found for the last 24 hours. **        Results for orders placed during the hospital encounter of 08/25/20   Transthoracic Echo Complete With Contrast if Necessary Per Protocol    Narrative · Left ventricular wall thickness is consistent with  mild-to-moderate   concentric hypertrophy.  · Left ventricular systolic function is normal.  · Left ventricular diastolic dysfunction.  · Right ventricular cavity is moderately dilated.  · Left atrial cavity size is mildly dilated.  · Mild mitral valve regurgitation is present  · Mild tricuspid valve regurgitation is present.  · Calculated right ventricular systolic pressure from tricuspid   regurgitation is 31 mmHg.  · Mild pulmonic valve regurgitation is present.  · The aortic valve has a small non mobile echogenic thickening of the L   coronary cusp. This could represent a healed vegetation.        I have reviewed the medications:  Scheduled Meds:    amLODIPine 5 mg Oral Q24H   apixaban 5 mg Oral Q12H   aspirin 81 mg Oral Daily   cholecalciferol 1,000 Units Oral Daily   docusate sodium 100 mg Oral Daily   famotidine 20 mg Oral BID AC   folic acid 1 mg Oral Daily   hydrALAZINE 100 mg Oral Q8H   insulin detemir 10 Units Subcutaneous Q24H   insulin lispro 0-7 Units Subcutaneous TID AC   insulin lispro 5 Units Subcutaneous TID With Meals   ipratropium-albuterol 3 mL Nebulization 4x Daily - RT   metoprolol tartrate 100 mg Oral Q12H   nicotine 1 patch Transdermal Q24H   pharmacy consult - MTM  Does not apply Daily   pravastatin 80 mg Oral Daily   sodium chloride 10 mL Intravenous Q12H   terazosin 5 mg Oral Q12H     Continuous Infusions:    dilTIAZem 5-15 mg/hr Last Rate: 5 mg/hr (08/29/20 1900)   niCARdipine 5-15 mg/hr Last Rate: Stopped (08/29/20 1112)     PRN Meds:.•  acetaminophen **OR** acetaminophen **OR** acetaminophen  •  ALPRAZolam  •  dextrose  •  dextrose  •  glucagon (human recombinant)  •  ipratropium-albuterol  •  melatonin  •  ondansetron  •  potassium chloride  •  potassium chloride  •  sodium chloride    Assessment/Plan   Assessment & Plan     Active Hospital Problems    Diagnosis  POA   • **Atrial fibrillation with RVR (CMS/HCC) [I48.91]  Yes   • Acute respiratory failure with hypoxia (CMS/HCC)  [J96.01]  Unknown   • Pneumonia [J18.9]  Unknown   • CHF (congestive heart failure) (CMS/[facility]) [I50.9]  Unknown   • HTN (hypertension) [I10]  Unknown   • Anxiety [F41.9]  Unknown   • MILKA (acute kidney injury) (CMS/[facility]) [N17.9]  Unknown   • CKD (chronic kidney disease) stage 4, GFR 15-29 ml/min (CMS/HCC) [N18.4]  Unknown   • Macrocytic anemia [D53.9]  Unknown   • Renal artery stenosis s/p stent [I70.1]  Unknown   • COPD (chronic obstructive pulmonary disease) (CMS/[facility]) [J44.9]  Unknown   • CAD (coronary artery disease) s/p stent [I25.10]  Unknown   • Tobacco abuse [Z72.0]  Unknown   • Type 2 diabetes mellitus (CMS/[facility]) [E11.9]  Unknown      Resolved Hospital Problems   No resolved problems to display.        Brief Hospital Course to date:  Johanne Lucas is a 55 y.o. female past medical history of CHF, atrial fibrillation on Eliquis, diabetes, coronary artery disease with stent, COPD, tobacco use, ckd 4 who presented to the ER with acute onset of palpitations, shortness of breath, diaphoresis and chest pain.  The patient states that after waking up from a nap she became short of breath with racing heart and chest pain that radiated into her neck    Adjusting medication for hypertension and renal dysfunction.  Had to de-escalate insulin therapy and bowel regimen.  She says she takes Lactulose at home, but now we are holding    This patient's problems and plans were partially entered by my partner and updated as appropriate by me 09/01/20. Today is my first day evaluating this patients active medical problems. I Personally reviewed chart and adjusted note to reflect daily changes in management/clinical condition          Acute Hypoxic resp failure    Bilateral pulm infiltrates, favor DHF  -completed empiric antibiotics  -s/p diuresis  -Continue with antimicrobial coverage  -Wean oxygen as tolerated  -Elevated BNP - 2,842    Acute on chronic DHF  -s/p diuresis  -stopping lasix 9/1/20    Aortic valve thickening on  echo  -per cards    Parox Afib, currently in sinus rhythm  -on eliquis    Acute on Chronic kidney disease 4  -Presumed diabetic neuropathy  -Has had dialysis in the past  -Hyperkalemia  -stopping lasix 9/1/20    Diabetes  -Uncontrolled diabetes with hemoglobin A1c 9.2  -Adjust medications as needed  -stop tradjenta    Hypertension  - Norvasc  - Hydralazine 100 mg every 8 hours  - metoprolol 50 mg TID  - terazosin 5 mg every 12 hours    Obesity    --------------------------------------------------------    Plan:  -stop lasix, other nephrotoxic meds (cr from 2.8--> 3.8 yesterday afternoon--> 4.1 today). Nephrology informed, to follow    -room air sats ok while awake currently, likely need night oxygen    -continue rate control per cards; continue eliquis for now (d/w pharmacy) for afib    Am labs: cbc,renal panel    DVT Prophylaxis: Apixaban    Disposition: I expect the patient to be discharged when renal function stable & ok w/ consultants    CODE STATUS:   Code Status and Medical Interventions:   Ordered at: 08/26/20 0055     Level Of Support Discussed With:    Patient     Code Status:    CPR     Medical Interventions (Level of Support Prior to Arrest):    Full         Electronically signed by Dequan Mae MD, 09/01/20, 11:52.      Electronically signed by Dequan Mae MD at 09/01/20 8630

## 2020-09-03 NOTE — PROGRESS NOTES
[facility]       LOS: 9 days   Patient Care Team:  Daiana Coyne APRN as PCP - General (Nurse Practitioner)        Subjective       Patient Denies:  Cp, palpitations.  Breathing ok.  Feels good.  Wants to go home.      Vital Signs  Temp:  [97 °F (36.1 °C)-98.8 °F (37.1 °C)] 97 °F (36.1 °C)  Heart Rate:  [59-77] 73  Resp:  [16-20] 16  BP: (143-166)/(55-77) 166/77    Intake/Output Summary (Last 24 hours) at 9/3/2020 0914  Last data filed at 9/3/2020 0700  Gross per 24 hour   Intake 960 ml   Output 1640 ml   Net -680 ml     No intake/output data recorded.    Physical Exam:     General Appearance:    Alert, cooperative, in no acute distress       Neck:   No adenopathy, supple, trachea midline, no thyromegaly, no JVD   Lungs:     Decreased to auscultation,respirations regular, even and                unlabored    Heart:    Regular rhythm and normal rate, normal S1 and S2, no         murmur, no gallop, no rub, no click   Chest Wall:    No abnormalities observed   Abdomen:     Normal bowel sounds, no masses, no organomegaly, soft     nontender, nondistended   Extremities:   Moves all extremities well, + edema, no cyanosis, no           redness   Pulses:   Pulses palpable and equal bilaterally     Results Review:     I reviewed the patient's new clinical results.      WBC WBC   Date/Time Value Ref Range Status   09/02/2020 0819 8.52 3.40 - 10.80 10*3/mm3 Final   08/31/2020 1140 9.27 3.40 - 10.80 10*3/mm3 Final            HGB Hemoglobin   Date/Time Value Ref Range Status   09/02/2020 0819 11.5 (L) 12.0 - 15.9 g/dL Final   08/31/2020 1140 11.6 (L) 12.0 - 15.9 g/dL Final           HCT Hematocrit   Date/Time Value Ref Range Status   09/02/2020 0819 35.9 34.0 - 46.6 % Final   08/31/2020 1140 37.1 34.0 - 46.6 % Final            Platelets Platelets   Date/Time Value Ref Range Status   09/02/2020 0819 188 140 - 450 10*3/mm3 Final   08/31/2020 1140 198 140 - 450  10*3/mm3 Final     Sodium  Sodium   Date/Time Value Ref Range Status   09/03/2020 0814 135 (L) 136 - 145 mmol/L Final   09/02/2020 0819 137 136 - 145 mmol/L Final   09/01/2020 1014 137 136 - 145 mmol/L Final   08/31/2020 1140 140 136 - 145 mmol/L Final     Potassium  Potassium   Date/Time Value Ref Range Status   09/03/2020 0814 4.3 3.5 - 5.2 mmol/L Final   09/02/2020 0819 4.5 3.5 - 5.2 mmol/L Final   09/01/2020 1014 4.5 3.5 - 5.2 mmol/L Final   09/01/2020 1014 4.5 3.5 - 5.2 mmol/L Final   08/31/2020 1140 3.4 (L) 3.5 - 5.2 mmol/L Final     Comment:     Slight hemolysis detected by analyzer. Results may be affected.     Chloride  Chloride   Date/Time Value Ref Range Status   09/03/2020 0814 99 98 - 107 mmol/L Final   09/02/2020 0819 101 98 - 107 mmol/L Final   09/01/2020 1014 98 98 - 107 mmol/L Final   08/31/2020 1140 97 (L) 98 - 107 mmol/L Final     BicarbonateNo results found for: PLASMABICARB    BUN BUN   Date/Time Value Ref Range Status   09/03/2020 0814 80 (H) 6 - 20 mg/dL Final   09/02/2020 0819 78 (H) 6 - 20 mg/dL Final   09/01/2020 1014 75 (H) 6 - 20 mg/dL Final   08/31/2020 1140 70 (H) 6 - 20 mg/dL Final      Creatinine Creatinine   Date/Time Value Ref Range Status   09/03/2020 0814 4.45 (H) 0.57 - 1.00 mg/dL Final   09/02/2020 0819 4.11 (H) 0.57 - 1.00 mg/dL Final   09/01/2020 1014 4.12 (H) 0.57 - 1.00 mg/dL Final   08/31/2020 1140 3.83 (H) 0.57 - 1.00 mg/dL Final      Calcium Calcium   Date/Time Value Ref Range Status   09/03/2020 0814 9.4 8.6 - 10.5 mg/dL Final   09/02/2020 0819 9.3 8.6 - 10.5 mg/dL Final   09/01/2020 1014 9.5 8.6 - 10.5 mg/dL Final   08/31/2020 1140 9.5 8.6 - 10.5 mg/dL Final      Mag @RESULFAST(MG:3)@        PT/INR:       Lab Results   Component Value Date    PROTIME 14.9 (H) 08/25/2020    INR 1.20 (H) 08/25/2020      Troponin I:  No results found for: TROPONINI   Lab Results   Component Value Date    TROPONINT 0.092 (C) 08/26/2020         amLODIPine 5 mg Oral Q24H   apixaban 5 mg Oral  Q12H   aspirin 81 mg Oral Daily   cholecalciferol 1,000 Units Oral Daily   docusate sodium 100 mg Oral Daily   famotidine 20 mg Oral BID AC   folic acid 1 mg Oral Daily   hydrALAZINE 100 mg Oral Q8H   insulin detemir 10 Units Subcutaneous Q24H   insulin lispro 0-7 Units Subcutaneous TID AC   insulin lispro 5 Units Subcutaneous TID With Meals   metoprolol tartrate 100 mg Oral Q12H   nicotine 1 patch Transdermal Q24H   pharmacy consult - MTM  Does not apply Daily   pravastatin 80 mg Oral Daily   sodium chloride 10 mL Intravenous Q12H   terazosin 10 mg Oral Q12H       dilTIAZem 5-15 mg/hr Last Rate: 5 mg/hr (08/29/20 1900)   niCARdipine 5-15 mg/hr Last Rate: Stopped (08/29/20 1112)       Assessment/Plan     Patient Active Problem List   Diagnosis Code   • Atrial fibrillation with RVR (CMS/HCC) I48.91   • Acute respiratory failure with hypoxia (CMS/HCC) J96.01   • Pneumonia J18.9   • CHF (congestive heart failure) (CMS/HCC) I50.9   • HTN (hypertension) I10   • Anxiety F41.9   • MILKA (acute kidney injury) (CMS/HCC) N17.9   • CKD (chronic kidney disease) stage 4, GFR 15-29 ml/min (CMS/HCC) N18.4   • Macrocytic anemia D53.9   • Renal artery stenosis s/p stent I70.1   • COPD (chronic obstructive pulmonary disease) (CMS/HCC) J44.9   • CAD (coronary artery disease) s/p stent I25.10   • Tobacco abuse Z72.0   • Type 2 diabetes mellitus (CMS/HCC) E11.9     Hypertension   Normal EF  Paroxysmal atrial fibrillation maintaining sinus rhythm  Chronic kidney disease; increase in creatinine yesterday and again today  COPD      Continue anticoagulation  Worsening renal function.  Renal following  Per Renal      MD Micahel Hubbard PA  09/03/20  09:14

## 2020-09-04 VITALS
HEIGHT: 67 IN | BODY MASS INDEX: 38.61 KG/M2 | WEIGHT: 246 LBS | HEART RATE: 67 BPM | TEMPERATURE: 97.5 F | RESPIRATION RATE: 18 BRPM | OXYGEN SATURATION: 92 % | SYSTOLIC BLOOD PRESSURE: 152 MMHG | DIASTOLIC BLOOD PRESSURE: 62 MMHG

## 2020-09-04 LAB
ANION GAP SERPL CALCULATED.3IONS-SCNC: 13 MMOL/L (ref 5–15)
BUN SERPL-MCNC: 81 MG/DL (ref 6–20)
BUN/CREAT SERPL: 21.7 (ref 7–25)
CALCIUM SPEC-SCNC: 9.3 MG/DL (ref 8.6–10.5)
CHLORIDE SERPL-SCNC: 102 MMOL/L (ref 98–107)
CO2 SERPL-SCNC: 23 MMOL/L (ref 22–29)
CREAT SERPL-MCNC: 3.73 MG/DL (ref 0.57–1)
GFR SERPL CREATININE-BSD FRML MDRD: 15 ML/MIN/1.73
GLUCOSE BLDC GLUCOMTR-MCNC: 175 MG/DL (ref 70–130)
GLUCOSE BLDC GLUCOMTR-MCNC: 255 MG/DL (ref 70–130)
GLUCOSE SERPL-MCNC: 167 MG/DL (ref 65–99)
POTASSIUM SERPL-SCNC: 4.2 MMOL/L (ref 3.5–5.2)
SODIUM SERPL-SCNC: 138 MMOL/L (ref 136–145)

## 2020-09-04 PROCEDURE — 80048 BASIC METABOLIC PNL TOTAL CA: CPT | Performed by: NURSE PRACTITIONER

## 2020-09-04 PROCEDURE — 63710000001 INSULIN DETEMIR PER 5 UNITS: Performed by: HOSPITALIST

## 2020-09-04 PROCEDURE — 63710000001 INSULIN LISPRO (HUMAN) PER 5 UNITS: Performed by: HOSPITALIST

## 2020-09-04 PROCEDURE — 94799 UNLISTED PULMONARY SVC/PX: CPT

## 2020-09-04 PROCEDURE — 63710000001 INSULIN LISPRO (HUMAN) PER 5 UNITS: Performed by: INTERNAL MEDICINE

## 2020-09-04 PROCEDURE — 82962 GLUCOSE BLOOD TEST: CPT

## 2020-09-04 PROCEDURE — 99239 HOSP IP/OBS DSCHRG MGMT >30: CPT | Performed by: HOSPITALIST

## 2020-09-04 RX ORDER — NICOTINE 21 MG/24HR
1 PATCH, TRANSDERMAL 24 HOURS TRANSDERMAL
Qty: 30 PATCH | Refills: 0 | Status: SHIPPED | OUTPATIENT
Start: 2020-09-05 | End: 2020-10-05

## 2020-09-04 RX ORDER — METOPROLOL TARTRATE 100 MG/1
100 TABLET ORAL EVERY 12 HOURS SCHEDULED
Qty: 60 TABLET | Refills: 0 | Status: SHIPPED | OUTPATIENT
Start: 2020-09-04 | End: 2020-10-04

## 2020-09-04 RX ORDER — HYDRALAZINE HYDROCHLORIDE 100 MG/1
100 TABLET, FILM COATED ORAL EVERY 8 HOURS SCHEDULED
Qty: 90 TABLET | Refills: 0 | Status: SHIPPED | OUTPATIENT
Start: 2020-09-04 | End: 2020-10-04

## 2020-09-04 RX ORDER — PSEUDOEPHEDRINE HCL 30 MG
100 TABLET ORAL DAILY
Qty: 30 EACH | Refills: 0 | Status: SHIPPED | OUTPATIENT
Start: 2020-09-05 | End: 2020-10-05

## 2020-09-04 RX ORDER — TERAZOSIN 10 MG/1
10 CAPSULE ORAL EVERY 12 HOURS SCHEDULED
Qty: 60 CAPSULE | Refills: 0 | Status: SHIPPED | OUTPATIENT
Start: 2020-09-04 | End: 2020-10-04

## 2020-09-04 RX ORDER — AMLODIPINE BESYLATE 5 MG/1
5 TABLET ORAL
Qty: 30 TABLET | Refills: 0 | Status: SHIPPED | OUTPATIENT
Start: 2020-09-05 | End: 2020-10-05

## 2020-09-04 RX ADMIN — INSULIN DETEMIR 10 UNITS: 100 INJECTION, SOLUTION SUBCUTANEOUS at 09:00

## 2020-09-04 RX ADMIN — INSULIN LISPRO 4 UNITS: 100 INJECTION, SOLUTION INTRAVENOUS; SUBCUTANEOUS at 12:25

## 2020-09-04 RX ADMIN — METOPROLOL TARTRATE 100 MG: 100 TABLET ORAL at 09:23

## 2020-09-04 RX ADMIN — PRAVASTATIN SODIUM 80 MG: 40 TABLET ORAL at 09:23

## 2020-09-04 RX ADMIN — ALPRAZOLAM 1 MG: 0.25 TABLET ORAL at 09:40

## 2020-09-04 RX ADMIN — DOCUSATE SODIUM 100 MG: 100 CAPSULE, LIQUID FILLED ORAL at 09:23

## 2020-09-04 RX ADMIN — INSULIN LISPRO 5 UNITS: 100 INJECTION, SOLUTION INTRAVENOUS; SUBCUTANEOUS at 09:24

## 2020-09-04 RX ADMIN — APIXABAN 5 MG: 5 TABLET, FILM COATED ORAL at 09:23

## 2020-09-04 RX ADMIN — FOLIC ACID 1 MG: 1 TABLET ORAL at 09:25

## 2020-09-04 RX ADMIN — TERAZOSIN HYDROCHLORIDE 10 MG: 5 CAPSULE ORAL at 09:23

## 2020-09-04 RX ADMIN — HYDROCODONE BITARTRATE AND ACETAMINOPHEN 1 TABLET: 7.5; 325 TABLET ORAL at 07:01

## 2020-09-04 RX ADMIN — INSULIN LISPRO 5 UNITS: 100 INJECTION, SOLUTION INTRAVENOUS; SUBCUTANEOUS at 12:25

## 2020-09-04 RX ADMIN — NICOTINE 1 PATCH: 21 PATCH, EXTENDED RELEASE TRANSDERMAL at 09:24

## 2020-09-04 RX ADMIN — HYDRALAZINE HYDROCHLORIDE 100 MG: 50 TABLET, FILM COATED ORAL at 06:57

## 2020-09-04 RX ADMIN — INSULIN LISPRO 2 UNITS: 100 INJECTION, SOLUTION INTRAVENOUS; SUBCUTANEOUS at 09:25

## 2020-09-04 RX ADMIN — HYDRALAZINE HYDROCHLORIDE 100 MG: 50 TABLET, FILM COATED ORAL at 15:11

## 2020-09-04 RX ADMIN — SODIUM CHLORIDE 100 ML/HR: 9 INJECTION, SOLUTION INTRAVENOUS at 00:10

## 2020-09-04 RX ADMIN — AMLODIPINE BESYLATE 5 MG: 5 TABLET ORAL at 09:23

## 2020-09-04 RX ADMIN — ASPIRIN 81 MG CHEWABLE TABLET 81 MG: 81 TABLET CHEWABLE at 09:23

## 2020-09-04 RX ADMIN — FAMOTIDINE 20 MG: 20 TABLET, FILM COATED ORAL at 06:57

## 2020-09-04 RX ADMIN — IPRATROPIUM BROMIDE AND ALBUTEROL SULFATE 3 ML: 2.5; .5 SOLUTION RESPIRATORY (INHALATION) at 07:46

## 2020-09-04 NOTE — PROGRESS NOTES
[facility]       LOS: 10 days   Patient Care Team:  Daiana Coyne APRN as PCP - General (Nurse Practitioner)        Subjective       Patient Denies:  Cp, palpitations.  Breathing ok.  Feels good.  Wants to go home.      Vital Signs  Temp:  [96.6 °F (35.9 °C)-98 °F (36.7 °C)] 97.8 °F (36.6 °C)  Heart Rate:  [62-86] 63  Resp:  [16-20] 20  BP: (133-150)/(52-66) 150/66    Intake/Output Summary (Last 24 hours) at 9/4/2020 0901  Last data filed at 9/4/2020 0641  Gross per 24 hour   Intake 2512 ml   Output 1500 ml   Net 1012 ml     No intake/output data recorded.    Physical Exam:     General Appearance:    Alert, cooperative, in no acute distress       Neck:   No adenopathy, supple, trachea midline, no thyromegaly, no JVD   Lungs:     Decreased to auscultation,respirations regular, even and                unlabored    Heart:    Regular rhythm and normal rate, normal S1 and S2, no         murmur, no gallop, no rub, no click   Chest Wall:    No abnormalities observed   Abdomen:     Normal bowel sounds, no masses, no organomegaly, soft     nontender, nondistended   Extremities:   Moves all extremities well, + edema, no cyanosis, no           redness   Pulses:   Pulses palpable and equal bilaterally     Results Review:     I reviewed the patient's new clinical results.      WBC WBC   Date/Time Value Ref Range Status   09/02/2020 0819 8.52 3.40 - 10.80 10*3/mm3 Final            HGB Hemoglobin   Date/Time Value Ref Range Status   09/02/2020 0819 11.5 (L) 12.0 - 15.9 g/dL Final           HCT Hematocrit   Date/Time Value Ref Range Status   09/02/2020 0819 35.9 34.0 - 46.6 % Final            Platelets Platelets   Date/Time Value Ref Range Status   09/02/2020 0819 188 140 - 450 10*3/mm3 Final     Sodium  Sodium   Date/Time Value Ref Range Status   09/04/2020 0602 138 136 - 145 mmol/L Final   09/03/2020 0814 135 (L) 136 - 145 mmol/L Final   09/02/2020 0819 137 136  - 145 mmol/L Final   09/01/2020 1014 137 136 - 145 mmol/L Final     Potassium  Potassium   Date/Time Value Ref Range Status   09/04/2020 0602 4.2 3.5 - 5.2 mmol/L Final   09/03/2020 0814 4.3 3.5 - 5.2 mmol/L Final   09/02/2020 0819 4.5 3.5 - 5.2 mmol/L Final   09/01/2020 1014 4.5 3.5 - 5.2 mmol/L Final   09/01/2020 1014 4.5 3.5 - 5.2 mmol/L Final     Chloride  Chloride   Date/Time Value Ref Range Status   09/04/2020 0602 102 98 - 107 mmol/L Final   09/03/2020 0814 99 98 - 107 mmol/L Final   09/02/2020 0819 101 98 - 107 mmol/L Final   09/01/2020 1014 98 98 - 107 mmol/L Final     BicarbonateNo results found for: PLASMABICARB    BUN BUN   Date/Time Value Ref Range Status   09/04/2020 0602 81 (H) 6 - 20 mg/dL Final   09/03/2020 0814 80 (H) 6 - 20 mg/dL Final   09/02/2020 0819 78 (H) 6 - 20 mg/dL Final   09/01/2020 1014 75 (H) 6 - 20 mg/dL Final      Creatinine Creatinine   Date/Time Value Ref Range Status   09/04/2020 0602 3.73 (H) 0.57 - 1.00 mg/dL Final   09/03/2020 0814 4.45 (H) 0.57 - 1.00 mg/dL Final   09/02/2020 0819 4.11 (H) 0.57 - 1.00 mg/dL Final   09/01/2020 1014 4.12 (H) 0.57 - 1.00 mg/dL Final      Calcium Calcium   Date/Time Value Ref Range Status   09/04/2020 0602 9.3 8.6 - 10.5 mg/dL Final   09/03/2020 0814 9.4 8.6 - 10.5 mg/dL Final   09/02/2020 0819 9.3 8.6 - 10.5 mg/dL Final   09/01/2020 1014 9.5 8.6 - 10.5 mg/dL Final      Mag @RESULFAST(MG:3)@        PT/INR:       Lab Results   Component Value Date    PROTIME 14.9 (H) 08/25/2020    INR 1.20 (H) 08/25/2020      Troponin I:  No results found for: TROPONINI   Lab Results   Component Value Date    TROPONINT 0.092 (C) 08/26/2020         amLODIPine 5 mg Oral Q24H   apixaban 5 mg Oral Q12H   aspirin 81 mg Oral Daily   cholecalciferol 1,000 Units Oral Daily   docusate sodium 100 mg Oral Daily   famotidine 20 mg Oral BID AC   folic acid 1 mg Oral Daily   hydrALAZINE 100 mg Oral Q8H   insulin detemir 10 Units Subcutaneous Q24H   insulin lispro 0-7 Units  Subcutaneous TID AC   insulin lispro 5 Units Subcutaneous TID With Meals   metoprolol tartrate 100 mg Oral Q12H   nicotine 1 patch Transdermal Q24H   pharmacy consult - MTM  Does not apply Daily   pravastatin 80 mg Oral Daily   sodium chloride 10 mL Intravenous Q12H   terazosin 10 mg Oral Q12H       dilTIAZem 5-15 mg/hr Last Rate: 5 mg/hr (08/29/20 1900)   niCARdipine 5-15 mg/hr Last Rate: Stopped (08/29/20 1112)   sodium chloride 100 mL/hr Last Rate: 100 mL/hr (09/04/20 0010)       Assessment/Plan     Patient Active Problem List   Diagnosis Code   • Atrial fibrillation with RVR (CMS/HCC) I48.91   • Acute respiratory failure with hypoxia (CMS/HCC) J96.01   • Pneumonia J18.9   • CHF (congestive heart failure) (CMS/HCC) I50.9   • HTN (hypertension) I10   • Anxiety F41.9   • MILKA (acute kidney injury) (CMS/HCC) N17.9   • CKD (chronic kidney disease) stage 4, GFR 15-29 ml/min (CMS/HCC) N18.4   • Macrocytic anemia D53.9   • Renal artery stenosis s/p stent I70.1   • COPD (chronic obstructive pulmonary disease) (CMS/HCC) J44.9   • CAD (coronary artery disease) s/p stent I25.10   • Tobacco abuse Z72.0   • Type 2 diabetes mellitus (CMS/HCC) E11.9     Hypertension   Normal EF  Paroxysmal atrial fibrillation maintaining sinus rhythm  Chronic kidney disease; improved today  COPD      Continue anticoagulation and beta-blocker  Home when okay with nephrology  We will see in office in 1 month        Rah Atkins MD  09/04/20  09:01

## 2020-09-04 NOTE — DISCHARGE SUMMARY
[facility] Medicine Services  DISCHARGE SUMMARY    Patient Name: Johanne Lucas  : 1965  MRN: [id number]    Date of Admission: 2020  9:07 PM  Date of Discharge:  2020 (Friday)  Primary Care Physician: Daiana Coyne APRN    Consults     Date and Time Order Name Status Description    2020 0903 Inpatient Nephrology Consult      2020 0327 Inpatient Cardiology Consult Completed         Michel Bautista MD - Nephrology  Rah Atkins MD - Cardiology   Perez Mayen MD - Cardiology    Hospital Course     Presenting Problem:   Atrial fibrillation with RVR (CMS/[facility]) [I48.91]  Atrial fibrillation with RVR (CMS/[facility]) [I48.91]    Active Hospital Problems    Diagnosis  POA   • **Atrial fibrillation with RVR (CMS/[facility]) [I48.91]  Yes   • Acute respiratory failure with hypoxia (CMS/[facility]) [J96.01]  Unknown   • Pneumonia [J18.9]  Unknown   • CHF (congestive heart failure) (CMS/[facility]) [I50.9]  Unknown   • HTN (hypertension) [I10]  Unknown   • Anxiety [F41.9]  Unknown   • MILKA (acute kidney injury) (CMS/[facility]) [N17.9]  Unknown   • CKD (chronic kidney disease) stage 4, GFR 15-29 ml/min (CMS/[facility]) [N18.4]  Unknown   • Macrocytic anemia [D53.9]  Unknown   • Renal artery stenosis s/p stent [I70.1]  Unknown   • COPD (chronic obstructive pulmonary disease) (CMS/[facility]) [J44.9]  Unknown   • CAD (coronary artery disease) s/p stent [I25.10]  Unknown   • Tobacco abuse [Z72.0]  Unknown   • Type 2 diabetes mellitus (CMS/[facility]) [E11.9]  Unknown      Resolved Hospital Problems   No resolved problems to display.      Morbid Obesity  Nocturnal desaturations  Atrial Fibrillation with RVR    Hospital Course:  Johanne Lucas is a 55 y.o. female with history of atrial fibrillation, obesity, CAD with multiple cardiac stents, history of CKD and dialysis, constipation, poor sleep, and Renal Artery Stenting who presented to the ER after waking up with shortness of breath and palpitations.  She was  found to be in A Fib RVR.  She is a smoker and has several stents.  She has severe kidney disease and has a remote history of renal stenting and dialysis.    She was admitted to rule out COVID-19 and improve her respiratory status, renal status, and control her a fib RVR.  Her clinical picture was more of volume overload, but she was empirically treated for pneumonia while here.  It was difficult to treat CHF with severe kidney impairment and a goal of avoiding nephrotoxic medications.  Her medications were titrated daily while here and was seen by Cardiology and Nephrology.  At one point, she had chest pain and went back in to A Fib with RVR.  She was on both Cardene drip and Cardizem drip at different times.  She had an episode of hypoglycemia and nocturnal hypoxia and there were signs of significant sleep problems.  Realizing that her symptoms all started while asleep, it is worth mentioning that she may benefit from positive airway pressure to prevent a fib and desaturations which may be effecting cardiac performance.  Investigating, diagnosing and treating sleep apnea may reduce a fib, volume overload and decrease long term diuretic use in this patient who has a delicate cardiopulmonary circuit, poor sleep, and poor renal function.      She spent some time in the hospital tuning up the circuit with medication adjustment and vigilant monitoring of kidney function by Nephrology while hospitalized.  She appears to be at high risk for re-admission to the hospital due to multiple medical problems at a very early age in life.    Discharge Follow Up Recommendations for outpatient labs/diagnostics:   [facility] in 1 week - Dr. Mayen in 1 month    Day of Discharge     HPI:   Has been asking to go home for days.  No chest pain reported.  No overnight events reported.      Review of Systems    Gen- No fevers, chills  CV- No chest pain, palpitations  Resp- No cough, dyspnea  GI- No N/V/D, abd  pain    Vital Signs:   Temp:  [97.5 °F (36.4 °C)-98 °F (36.7 °C)] 97.5 °F (36.4 °C)  Heart Rate:  [63-86] 67  Resp:  [16-20] 18  BP: (142-152)/(59-66) 152/62     Physical Exam:    Constitutional: No acute distress, awake, alert  HENT: NCAT, mucous membranes moist  Respiratory: Clear to auscultation bilaterally, respiratory effort normal   Cardiovascular: RRR, s1 and s2  Gastrointestinal: Positive bowel sounds, soft, nontender, obese abdomen  Musculoskeletal: No bilateral ankle edema  Psychiatric: Appropriate affect, cooperative  Skin: dry    Pertinent  and/or Most Recent Results     Results from last 7 days   Lab Units 09/04/20  0602 09/03/20  0814 09/02/20  0819 09/01/20  1014 08/31/20  1140 08/29/20  0752   WBC 10*3/mm3  --   --  8.52  --  9.27  --    HEMOGLOBIN g/dL  --   --  11.5*  --  11.6*  --    HEMATOCRIT %  --   --  35.9  --  37.1  --    PLATELETS 10*3/mm3  --   --  188  --  198  --    SODIUM mmol/L 138 135* 137 137 140 137   POTASSIUM mmol/L 4.2 4.3 4.5 4.5  4.5 3.4* 3.2*   CHLORIDE mmol/L 102 99 101 98 97* 97*   CO2 mmol/L 23.0 23.0 25.0 25.0 27.0 28.0   BUN mg/dL 81* 80* 78* 75* 70* 65*   CREATININE mg/dL 3.73* 4.45* 4.11* 4.12* 3.83* 2.82*   GLUCOSE mg/dL 167* 182* 187* 236* 90 161*   CALCIUM mg/dL 9.3 9.4 9.3 9.5 9.5 9.7           Invalid input(s): PROT, LABALBU        Invalid input(s): TG, LDLCALC, LDLREALC        Brief Urine Lab Results  (Last result in the past 365 days)      Color   Clarity   Blood   Leuk Est   Nitrite   Protein   CREAT   Urine HCG        08/30/20 2156             54.7       08/30/20 2156 Yellow Clear Negative Negative Negative 100 mg/dL (2+)               Microbiology Results Abnormal     Procedure Component Value - Date/Time    Blood Culture - Blood, Arm, Left [[id number]] Collected:  08/25/20 2118    Lab Status:  Final result Specimen:  Blood from Arm, Left Updated:  08/30/20 2145     Blood Culture No growth at 5 days    Blood Culture - Blood, Arm, Right [[id number]] Collected:   08/25/20 2118    Lab Status:  Final result Specimen:  Blood from Arm, Right Updated:  08/30/20 2145     Blood Culture No growth at 5 days    S. Pneumo Ag Urine or CSF - Urine, Urine, Clean Catch [[id number]]  (Normal) Collected:  08/26/20 0405    Lab Status:  Final result Specimen:  Urine, Clean Catch Updated:  08/26/20 0911     Strep Pneumo Ag Negative    Legionella Antigen, Urine - Urine, Urine, Clean Catch [[id number]]  (Normal) Collected:  08/26/20 0405    Lab Status:  Final result Specimen:  Urine, Clean Catch Updated:  08/26/20 0910     LEGIONELLA ANTIGEN, URINE Negative    Respiratory Culture - Sputum, Cough [[id number]] Collected:  08/26/20 0700    Lab Status:  Final result Specimen:  Sputum from Cough Updated:  08/26/20 0852     Respiratory Culture Rejected     Gram Stain Many (4+) Epithelial cells per low power field      Rare (1+) WBCs per low power field      Moderate (3+) Mixed bacterial morphotypes seen on Gram Stain    Narrative:       Specimen rejected due to oropharyngeal contamination.    Respiratory Panel PCR w/COVID-19(SARS-CoV-2) [facility] In-House, NP Swab in UTM/VTM, 3-4 HR TAT - Swab, Nasopharynx [[id number]]  (Normal) Collected:  08/26/20 0131    Lab Status:  Final result Specimen:  Swab from Nasopharynx Updated:  08/26/20 0309     ADENOVIRUS, PCR Not Detected     Coronavirus 229E Not Detected     Coronavirus HKU1 Not Detected     Coronavirus NL63 Not Detected     Coronavirus OC43 Not Detected     COVID19 Not Detected     Human Metapneumovirus Not Detected     Human Rhinovirus/Enterovirus Not Detected     Influenza A PCR Not Detected     Influenza A H1 Not Detected     Influenza A H1 2009 PCR Not Detected     Influenza A H3 Not Detected     Influenza B PCR Not Detected     Parainfluenza Virus 1 Not Detected     Parainfluenza Virus 2 Not Detected     Parainfluenza Virus 3 Not Detected     Parainfluenza Virus 4 Not Detected     RSV, PCR Not Detected     Bordetella pertussis pcr Not Detected      Bordetella parapertussis PCR Not Detected     Chlamydophila pneumoniae PCR Not Detected     Mycoplasma pneumo by PCR Not Detected    Narrative:       Fact sheet for providers: https://docs.[employer]/wp-content/uploads/JXN6141-7500-UE1.1-EUA-Provider-Fact-Sheet-3.pdf    Fact sheet for patients: https://docs.[employer]/wp-content/uploads/WIV0378-3022-GD9.1-EUA-Patient-Fact-Sheet-1.pdf          Imaging Results (All)     Procedure Component Value Units Date/Time    CT Chest Without Contrast [[id number]] Collected:  08/25/20 2332     Updated:  08/25/20 2334    Narrative:       CT Chest WO    INDICATION:   Shortness of air and hypoxia today.    TECHNIQUE:   CT of the thorax without IV contrast. Coronal and sagittal reconstructions were obtained.  Radiation dose reduction techniques included automated exposure control or exposure modulation based on body size. Count of known CT and cardiac nuc med studies  performed in previous 12 months: 0.     COMPARISON:   None available.    FINDINGS:  No pleural effusions are seen. The heart is enlarged, and there is a small pericardial effusion measuring up to about 12 mm in thickness dependently. There is atherosclerotic disease and coronary artery disease. No adenopathy is seen. Images through the  upper abdomen show atrophy of the right kidney. There is extensive atherosclerotic disease in the visualized upper abdominal aorta, including the renal arteries.    There is mild bandlike atelectasis in the lower lobes, lingula, and right middle lobe. No definite evidence of acute pneumonia. Imaging features are atypical or uncommonly reported for COVID-19 pneumonia. Alternative diagnoses should be considered.      Impression:         1. Cardiomegaly with a small pericardial effusion.  2. Atherosclerotic disease and coronary artery disease.  3. Multifocal opacities in the lungs have the appearance of atelectasis rather than pneumonia.  4. Right renal atrophy.    Signer Name:  Sheldon Melchor MD   Signed: 8/25/2020 11:32 PM   Workstation Name: [facility]    [facility]          Results for orders placed during the hospital encounter of 08/25/20   Duplex Renal Artery - Bilateral Complete CAR    Narrative EXAMINATION: DUPLEX RENAL ARTERY BILATERAL COMPLETE CAR- 08/27/2020     INDICATION: unilateral small kidney renal insufficiency     TECHNIQUE: Duplex interrogation was performed of the kidneys bilaterally  in both the sagittal and transverse planes.        COMPARISON: NONE     FINDINGS: There is nonvisualization of the right kidney. The left kidney  is somewhat difficult to visualize due to overlying bowel gas. The left  kidney measures in length from pole to pole 11.4 cm. The left hilar vein  is patent. The resistive indices in the left kidney are mildly elevated  at 0.8. The peak systolic velocity of the abdominal aorta is 227 cm/s.  The left renal to aorta ratio is 0.42.           Impression Abnormal parenchymal blood flow pattern seen within the left  kidney. No evidence of renal artery stenosis on the left. There is  nonvisualization of the right kidney.     D:  08/27/2020  E:  08/27/2020        This report was finalized on 8/27/2020 4:53 PM by Dr. Isatu Hadley MD.          Results for orders placed during the hospital encounter of 08/25/20   Duplex Renal Artery - Bilateral Complete CAR    Narrative EXAMINATION: DUPLEX RENAL ARTERY BILATERAL COMPLETE CAR- 08/27/2020     INDICATION: unilateral small kidney renal insufficiency     TECHNIQUE: Duplex interrogation was performed of the kidneys bilaterally  in both the sagittal and transverse planes.        COMPARISON: NONE     FINDINGS: There is nonvisualization of the right kidney. The left kidney  is somewhat difficult to visualize due to overlying bowel gas. The left  kidney measures in length from pole to pole 11.4 cm. The left hilar vein  is patent. The resistive indices in the left kidney are mildly  elevated  at 0.8. The peak systolic velocity of the abdominal aorta is 227 cm/s.  The left renal to aorta ratio is 0.42.           Impression Abnormal parenchymal blood flow pattern seen within the left  kidney. No evidence of renal artery stenosis on the left. There is  nonvisualization of the right kidney.     D:  08/27/2020  E:  08/27/2020        This report was finalized on 8/27/2020 4:53 PM by Dr. Isatu Hadley MD.          Results for orders placed during the hospital encounter of 08/25/20   Transthoracic Echo Complete With Contrast if Necessary Per Protocol    Narrative · Left ventricular wall thickness is consistent with mild-to-moderate   concentric hypertrophy.  · Left ventricular systolic function is normal.  · Left ventricular diastolic dysfunction.  · Right ventricular cavity is moderately dilated.  · Left atrial cavity size is mildly dilated.  · Mild mitral valve regurgitation is present  · Mild tricuspid valve regurgitation is present.  · Calculated right ventricular systolic pressure from tricuspid   regurgitation is 31 mmHg.  · Mild pulmonic valve regurgitation is present.  · The aortic valve has a small non mobile echogenic thickening of the L   coronary cusp. This could represent a healed vegetation.          Discharge Details        Discharge Medications      New Medications      Instructions Start Date   amLODIPine 5 MG tablet  Commonly known as:  NORVASC   5 mg, Oral, Every 24 Hours Scheduled   Start Date:  September 5, 2020     docusate sodium 100 MG capsule   100 mg, Oral, Daily   Start Date:  September 5, 2020     insulin detemir 100 UNIT/ML injection  Commonly known as:  LEVEMIR   10 Units, Subcutaneous, Every 24 Hours Scheduled   Start Date:  September 5, 2020     nicotine 21 MG/24HR patch  Commonly known as:  NICODERM CQ   1 patch, Transdermal, Every 24 Hours Scheduled   Start Date:  September 5, 2020     terazosin 10 MG capsule  Commonly known as:  HYTRIN   10 mg, Oral, Every 12  Hours Scheduled         Changes to Medications      Instructions Start Date   hydrALAZINE 100 MG tablet  Commonly known as:  APRESOLINE  What changed:  when to take this   100 mg, Oral, Every 8 Hours Scheduled      metoprolol tartrate 100 MG tablet  Commonly known as:  LOPRESSOR  What changed:    · medication strength  · how much to take  · when to take this   100 mg, Oral, Every 12 Hours Scheduled         Continue These Medications      Instructions Start Date   albuterol sulfate  (90 Base) MCG/ACT inhaler  Commonly known as:  PROVENTIL HFA;VENTOLIN HFA;PROAIR HFA   2 puffs, Inhalation, Every 4 Hours PRN      ALPRAZolam 1 MG tablet  Commonly known as:  XANAX   1 mg, Oral, 3 Times Daily PRN      apixaban 5 MG tablet tablet  Commonly known as:  ELIQUIS   5 mg, Oral, 2 Times Daily      aspirin 81 MG chewable tablet   81 mg, Oral, Daily      cholecalciferol 25 MCG (1000 UT) tablet  Commonly known as:  VITAMIN D3   1,000 Units, Oral, Daily      famotidine 20 MG tablet  Commonly known as:  PEPCID   20 mg, Oral, 2 Times Daily      HYDROcodone-acetaminophen 7.5-325 MG per tablet  Commonly known as:  NORCO   1 tablet, Oral, Every 8 Hours PRN      Insulin Lispro 100 UNIT/ML injection pen  Commonly known as:  ADMELOG SOLOSTAR   Subcutaneous, 3 Times Daily Before Meals, Before each meal take 1 unit for every 10 carbs       ipratropium-albuterol 0.5-2.5 mg/3 ml nebulizer  Commonly known as:  DUO-NEB   3 mL, Nebulization, 4 Times Daily PRN      lactulose 10 GM/15ML solution  Commonly known as:  CHRONULAC   20 g, Oral, 2 Times Daily PRN      pravastatin 40 MG tablet  Commonly known as:  PRAVACHOL   80 mg, Oral, Daily         Stop These Medications    doxazosin 1 MG tablet  Commonly known as:  CARDURA     hydroCHLOROthiazide 25 MG tablet  Commonly known as:  HYDRODIURIL     Insulin Glargine 100 UNIT/ML injection pen  Commonly known as:  MYKEL ALBERTO            Allergies   Allergen Reactions   • Codeine Shortness Of  Breath   • Sulfa Antibiotics Shortness Of Breath   • Nitroglycerin Swelling and Cough     Caused tongue and lip swelling, as well as coughing.      Discharge Disposition:  Home or Self Care    Diet:  Hospital:  Diet Order   Procedures   • Diet Regular; Cardiac, Consistent Carbohydrate, Renal     Activity:    As tolerated    Restrictions or Other Recommendations:  May need to have outpatient sleep study - she was observed to be very sleep deprived here.  She woke up with symptoms of shortness of breath and A Fib RVR prior to presentation to our ER.  Additionally, she was observed to have nocturnal de-saturations under hospital monitoring       CODE STATUS:    Code Status and Medical Interventions:   Ordered at: 08/26/20 0055     Level Of Support Discussed With:    Patient     Code Status:    CPR     Medical Interventions (Level of Support Prior to Arrest):    Full     No future appointments.    Additional Instructions for the Follow-ups that You Need to Schedule     Discharge Follow-up with PCP   As directed       Currently Documented PCP:    Daiana Coyne APRN    PCP Phone Number:    [phone]     Follow Up Details:  Primary Care Doctor - 1 week - outpatient referral for sleep study on chronic pain medication         Discharge Follow-up with Specialty: Nephrology - 1 week; 1 Week   As directed      Specialty:  Nephrology - 1 week    Follow Up:  1 Week    Follow Up Details:  UA / Renal Function         Discharge Follow-up with Specialty: Sleep Medicine; 2 Weeks   As directed      Specialty:  Sleep Medicine    Follow Up:  2 Weeks    Follow Up Details:  needs outpatient sleep study on long standing - Norco 7.5             We stopped her home Hydrochlorothiazide (HTCZ).  She should avoid nephrotoxic medications as a general rule.  She has been on dialysis in the past but did not need RRT while hospitalized this time.  She should follow up with [facility] in 1 week.    She woke up short of  breath and in a fib RVR.  In other words, she woke up with the the symptoms that brought her in and caused admission.  She may have undiagnosed and untreated sleep apnea which is associated with atrial fibrillation.  She was observed to have nocturnal desaturations and slept all the time while hospitalized possibly suggesting poor quality sleep requiring more hours of sleep to feel refreshed or feel like she has slept.    She would benefit from an outpatient sleep study and follow up with Dr. Mayen's team in 4 weeks.    She is also on Xanax and Norco at home which may be effecting her respiratory status and possibly adding a central component to a sleep disorder as these are centrally suppressing medications that could cause apnea.    Electronically signed by Dean Spence MD, 09/04/20, 3:27 PM.    Time Spent on Discharge:  I spent  47  minutes on this discharge activity which included: face-to-face encounter with the patient, reviewing the data in the system, coordination of the care with the nursing staff as well as consultants, documentation, and entering orders.

## 2020-09-04 NOTE — PROGRESS NOTES
Continued Stay Note  [facility]     Patient Name: Johanne Lucas  MRN: [id number]  Today's Date: 9/4/2020    Admit Date: 8/25/2020    Discharge Plan     Row Name 09/04/20 1344       Plan    Plan  CM update     Patient/Family in Agreement with Plan  yes    Plan Comments  Patient states her goal remains to return home upon discharge. She has no new needs regarding discharge at this time. Cm will follow for any discharge needs that may arise - yue [phone]        Discharge Codes    No documentation.             Yue Vivar RN

## 2020-09-04 NOTE — PROGRESS NOTES
"   LOS: 10 days    Patient Care Team:  Daiana Coyne APRN as PCP - General (Nurse Practitioner)    Chief Complaint: MILKA on CKD stage III and IV    Subjective   Interval History:   No new events no new complaints. Feeling better   Review of Systems:   Denies N/V, CP or SOA    Objective     Vital Sign Min/Max for last 24 hours  Temp  Min: 96.6 °F (35.9 °C)  Max: 98 °F (36.7 °C)   BP  Min: 133/52  Max: 150/66   Pulse  Min: 62  Max: 86   Resp  Min: 16  Max: 20   SpO2  Min: 94 %  Max: 95 %   No data recorded   Weight  Min: 112 kg (246 lb)  Max: 112 kg (246 lb)     Flowsheet Rows      First Filed Value   Admission Height  165.1 cm (65\") Documented at 08/25/2020 2110   Admission Weight  113 kg (250 lb) Documented at 08/25/2020 2110          I/O this shift:  In: 360 [P.O.:360]  Out: 200 [Urine:200]  I/O last 3 completed shifts:  In: 3682 [P.O.:2625; I.V.:1057]  Out: 2740 [Urine:2740]    Physical Exam:  General Appearance: Alert oriented x3 , NAD  Eyes: normal conjunctiva, EOMI.  Neck: Supple no JVD.  Lungs: Clear auscultation,  nonlabored.  Heart: No gallop, murmur, rub, RRR.  Abdomen: Soft, nontender, positive bowel sounds, ND  Extremities: No edema, no cyanosis.  Neuro: No focal deficit, moving all extremities  AV fistula right upper arm.  Functional    WBC WBC   Date Value Ref Range Status   09/02/2020 8.52 3.40 - 10.80 10*3/mm3 Final      HGB Hemoglobin   Date Value Ref Range Status   09/02/2020 11.5 (L) 12.0 - 15.9 g/dL Final      HCT Hematocrit   Date Value Ref Range Status   09/02/2020 35.9 34.0 - 46.6 % Final      Platlets No results found for: LABPLAT   MCV MCV   Date Value Ref Range Status   09/02/2020 96.5 79.0 - 97.0 fL Final          Sodium Sodium   Date Value Ref Range Status   09/04/2020 138 136 - 145 mmol/L Final   09/03/2020 135 (L) 136 - 145 mmol/L Final   09/02/2020 137 136 - 145 mmol/L Final      Potassium Potassium   Date Value Ref Range Status   09/04/2020 4.2 3.5 - 5.2 mmol/L Final   09/03/2020 " 4.3 3.5 - 5.2 mmol/L Final   09/02/2020 4.5 3.5 - 5.2 mmol/L Final      Chloride Chloride   Date Value Ref Range Status   09/04/2020 102 98 - 107 mmol/L Final   09/03/2020 99 98 - 107 mmol/L Final   09/02/2020 101 98 - 107 mmol/L Final      CO2 CO2   Date Value Ref Range Status   09/04/2020 23.0 22.0 - 29.0 mmol/L Final   09/03/2020 23.0 22.0 - 29.0 mmol/L Final   09/02/2020 25.0 22.0 - 29.0 mmol/L Final      BUN BUN   Date Value Ref Range Status   09/04/2020 81 (H) 6 - 20 mg/dL Final   09/03/2020 80 (H) 6 - 20 mg/dL Final   09/02/2020 78 (H) 6 - 20 mg/dL Final      Creatinine Creatinine   Date Value Ref Range Status   09/04/2020 3.73 (H) 0.57 - 1.00 mg/dL Final   09/03/2020 4.45 (H) 0.57 - 1.00 mg/dL Final   09/02/2020 4.11 (H) 0.57 - 1.00 mg/dL Final      Calcium Calcium   Date Value Ref Range Status   09/04/2020 9.3 8.6 - 10.5 mg/dL Final   09/03/2020 9.4 8.6 - 10.5 mg/dL Final   09/02/2020 9.3 8.6 - 10.5 mg/dL Final      PO4 No results found for: CAPO4   Albumin Albumin   Date Value Ref Range Status   09/03/2020 3.80 3.50 - 5.20 g/dL Final   09/02/2020 3.50 3.50 - 5.20 g/dL Final      Magnesium No results found for: MG   Uric Acid No results found for: URICACID        Results Review:     I reviewed the patient's new clinical results.      amLODIPine 5 mg Oral Q24H   apixaban 5 mg Oral Q12H   aspirin 81 mg Oral Daily   cholecalciferol 1,000 Units Oral Daily   docusate sodium 100 mg Oral Daily   famotidine 20 mg Oral BID AC   folic acid 1 mg Oral Daily   hydrALAZINE 100 mg Oral Q8H   insulin detemir 10 Units Subcutaneous Q24H   insulin lispro 0-7 Units Subcutaneous TID AC   insulin lispro 5 Units Subcutaneous TID With Meals   metoprolol tartrate 100 mg Oral Q12H   nicotine 1 patch Transdermal Q24H   pharmacy consult - MTM  Does not apply Daily   pravastatin 80 mg Oral Daily   sodium chloride 10 mL Intravenous Q12H   terazosin 10 mg Oral Q12H       dilTIAZem 5-15 mg/hr Last Rate: 5 mg/hr (08/29/20 1900)    niCARdipine 5-15 mg/hr Last Rate: Stopped (08/29/20 1112)   sodium chloride 100 mL/hr Last Rate: 100 mL/hr (09/04/20 0010)       Medication Review: Reviewed    Assessment/Plan    1.  MILKA on CKD stage IV- .Improved.   2.  CKD stage IV - Hx of CHELITA s/p stent in 2009, Atrophic right kidney and DN. On dialysis in the past.  No renal artery stenosis on the left per duplex scan.  Left kidney 11.4 cm.  3.  Met acidosis: Resolved   4.  Afib w RVR: Stable   5.  Volume status:  Improved   6   DM  7.  Anemia  8.  Hypertension   9- Proteinuria - UPCR 1.1 gm.     Plan:  - Ok from renal stand to discharge patient. Follow up with [facility] in 1 weeks with renal function panel and UA.   - Take lasix as needed for gain of 5 pounds or more from baseline wt.   - Avoid nephrotoxic agents  - At discharge do not resume HCTZ.     Juliane Rodríguez MD  09/04/20  10:50

## 2020-09-05 ENCOUNTER — READMISSION MANAGEMENT (OUTPATIENT)
Dept: CALL CENTER | Facility: HOSPITAL | Age: 55
End: 2020-09-05

## 2020-09-05 NOTE — OUTREACH NOTE
Prep Survey      Responses   [facility] discharged from?  [place]   Is LACE score < 7 ?  No   Eligibility  Readm Mgmt   Discharge diagnosis  Atrial fibrillation with RVR,  pneumonia    COVID-19 Test Status  Negative   Does the patient have one of the following disease processes/diagnoses(primary or secondary)?  COPD/Pneumonia   Does the patient have Home health ordered?  No   Is there a DME ordered?  No   Prep survey completed?  Yes          Ruth Mills RN

## 2020-09-08 LAB
BH CV LOWER VASCULAR LEFT COMMON FEMORAL AUGMENT: NORMAL
BH CV LOWER VASCULAR LEFT COMMON FEMORAL COMPRESS: NORMAL
BH CV LOWER VASCULAR LEFT COMMON FEMORAL PHASIC: NORMAL
BH CV LOWER VASCULAR LEFT COMMON FEMORAL SPONT: NORMAL
BH CV LOWER VASCULAR LEFT DISTAL FEMORAL AUGMENT: NORMAL
BH CV LOWER VASCULAR LEFT DISTAL FEMORAL COMPRESS: NORMAL
BH CV LOWER VASCULAR LEFT DISTAL FEMORAL PHASIC: NORMAL
BH CV LOWER VASCULAR LEFT DISTAL FEMORAL SPONT: NORMAL
BH CV LOWER VASCULAR LEFT GASTRONEMIUS COMPRESS: NORMAL
BH CV LOWER VASCULAR LEFT GREATER SAPH AK COMPRESS: NORMAL
BH CV LOWER VASCULAR LEFT GREATER SAPH BK COMPRESS: NORMAL
BH CV LOWER VASCULAR LEFT LESSER SAPH COMPRESS: NORMAL
BH CV LOWER VASCULAR LEFT MID FEMORAL AUGMENT: NORMAL
BH CV LOWER VASCULAR LEFT MID FEMORAL COMPRESS: NORMAL
BH CV LOWER VASCULAR LEFT MID FEMORAL PHASIC: NORMAL
BH CV LOWER VASCULAR LEFT MID FEMORAL SPONT: NORMAL
BH CV LOWER VASCULAR LEFT PERONEAL COMPRESS: NORMAL
BH CV LOWER VASCULAR LEFT POPLITEAL AUGMENT: NORMAL
BH CV LOWER VASCULAR LEFT POPLITEAL COMPRESS: NORMAL
BH CV LOWER VASCULAR LEFT POPLITEAL PHASIC: NORMAL
BH CV LOWER VASCULAR LEFT POPLITEAL SPONT: NORMAL
BH CV LOWER VASCULAR LEFT POSTERIOR TIBIAL COMPRESS: NORMAL
BH CV LOWER VASCULAR LEFT PROFUNDA FEMORAL AUGMENT: NORMAL
BH CV LOWER VASCULAR LEFT PROFUNDA FEMORAL COMPRESS: NORMAL
BH CV LOWER VASCULAR LEFT PROFUNDA FEMORAL PHASIC: NORMAL
BH CV LOWER VASCULAR LEFT PROFUNDA FEMORAL SPONT: NORMAL
BH CV LOWER VASCULAR LEFT PROXIMAL FEMORAL AUGMENT: NORMAL
BH CV LOWER VASCULAR LEFT PROXIMAL FEMORAL COMPRESS: NORMAL
BH CV LOWER VASCULAR LEFT PROXIMAL FEMORAL PHASIC: NORMAL
BH CV LOWER VASCULAR LEFT PROXIMAL FEMORAL SPONT: NORMAL
BH CV LOWER VASCULAR LEFT SAPHENOFEMORAL JUNCTION AUGMENT: NORMAL
BH CV LOWER VASCULAR LEFT SAPHENOFEMORAL JUNCTION COMPRESS: NORMAL
BH CV LOWER VASCULAR LEFT SAPHENOFEMORAL JUNCTION PHASIC: NORMAL
BH CV LOWER VASCULAR LEFT SAPHENOFEMORAL JUNCTION SPONT: NORMAL
BH CV LOWER VASCULAR RIGHT COMMON FEMORAL AUGMENT: NORMAL
BH CV LOWER VASCULAR RIGHT COMMON FEMORAL COMPRESS: NORMAL
BH CV LOWER VASCULAR RIGHT COMMON FEMORAL PHASIC: NORMAL
BH CV LOWER VASCULAR RIGHT COMMON FEMORAL SPONT: NORMAL
BH CV LOWER VASCULAR RIGHT DISTAL FEMORAL AUGMENT: NORMAL
BH CV LOWER VASCULAR RIGHT DISTAL FEMORAL COMPRESS: NORMAL
BH CV LOWER VASCULAR RIGHT DISTAL FEMORAL PHASIC: NORMAL
BH CV LOWER VASCULAR RIGHT DISTAL FEMORAL SPONT: NORMAL
BH CV LOWER VASCULAR RIGHT GASTRONEMIUS COMPRESS: NORMAL
BH CV LOWER VASCULAR RIGHT GREATER SAPH AK COMPRESS: NORMAL
BH CV LOWER VASCULAR RIGHT GREATER SAPH BK COMPRESS: NORMAL
BH CV LOWER VASCULAR RIGHT LESSER SAPH COMPRESS: NORMAL
BH CV LOWER VASCULAR RIGHT MID FEMORAL AUGMENT: NORMAL
BH CV LOWER VASCULAR RIGHT MID FEMORAL COMPRESS: NORMAL
BH CV LOWER VASCULAR RIGHT MID FEMORAL PHASIC: NORMAL
BH CV LOWER VASCULAR RIGHT MID FEMORAL SPONT: NORMAL
BH CV LOWER VASCULAR RIGHT PERONEAL COMPRESS: NORMAL
BH CV LOWER VASCULAR RIGHT POPLITEAL AUGMENT: NORMAL
BH CV LOWER VASCULAR RIGHT POPLITEAL COMPRESS: NORMAL
BH CV LOWER VASCULAR RIGHT POPLITEAL PHASIC: NORMAL
BH CV LOWER VASCULAR RIGHT POPLITEAL SPONT: NORMAL
BH CV LOWER VASCULAR RIGHT POSTERIOR TIBIAL COMPRESS: NORMAL
BH CV LOWER VASCULAR RIGHT PROFUNDA FEMORAL COMPRESS: NORMAL
BH CV LOWER VASCULAR RIGHT PROFUNDA FEMORAL PHASIC: NORMAL
BH CV LOWER VASCULAR RIGHT PROFUNDA FEMORAL SPONT: NORMAL
BH CV LOWER VASCULAR RIGHT PROXIMAL FEMORAL COMPRESS: NORMAL
BH CV LOWER VASCULAR RIGHT PROXIMAL FEMORAL PHASIC: NORMAL
BH CV LOWER VASCULAR RIGHT PROXIMAL FEMORAL SPONT: NORMAL
BH CV LOWER VASCULAR RIGHT SAPHENOFEMORAL JUNCTION AUGMENT: NORMAL
BH CV LOWER VASCULAR RIGHT SAPHENOFEMORAL JUNCTION COMPRESS: NORMAL
BH CV LOWER VASCULAR RIGHT SAPHENOFEMORAL JUNCTION PHASIC: NORMAL
BH CV LOWER VASCULAR RIGHT SAPHENOFEMORAL JUNCTION SPONT: NORMAL

## 2020-09-09 ENCOUNTER — READMISSION MANAGEMENT (OUTPATIENT)
Dept: CALL CENTER | Facility: HOSPITAL | Age: 55
End: 2020-09-09

## 2020-09-09 NOTE — OUTREACH NOTE
COPD/PN Week 1 Survey      Responses   [facility] patient discharged from?  [place]   COVID-19 Test Status  Negative   Does the patient have one of the following disease processes/diagnoses(primary or secondary)?  COPD/Pneumonia   Is there a successful TCM telephone encounter documented?  No   Was the primary reason for admission:  Pneumonia   Week 1 attempt successful?  Yes   Call start time  1743   Rescheduled  Rescheduled-pt requested   Call end time  1743          Ofelia Webber RN

## 2020-09-09 NOTE — PAYOR COMM NOTE
"Ref # [id number]  Atten: Jaymie Galdamez RN, BSN  Phone # [phone]  Fax # [phone]  Reza Huizar (55 y.o. Female)     Date of Birth Social Security Number Address Home Phone MRN    1965  [address] [id number] [phone] [id number]    Gnosticist Marital Status          Spiritism        Admission Date Admission Type Admitting Provider Attending Provider Department, Room/Bed    8/25/20 Emergency Dean Spence MD  [facility] 6A, N620/1    Discharge Date Discharge Disposition Discharge Destination        9/4/2020 Home or Self Care              Attending Provider:  (none)   Allergies:  Codeine, Sulfa Antibiotics, Nitroglycerin    Isolation:  None   Infection:  None   Code Status:  Prior    Ht:  170 cm (66.93\")   Wt:  112 kg (246 lb)    Admission Cmt:  None   Principal Problem:  Atrial fibrillation with RVR (CMS/[facility]) [I48.91]                 Active Insurance as of 8/25/2020     Primary Coverage     Payor Plan Insurance Group Employer/Plan Group    [place] [id number]     Payor Plan Address Payor Plan Phone Number Payor Plan Fax Number Effective Dates    [employer] Claims Office - [address] [address] [phone]  4/1/2020 - None Entered    [facility] [id number]       Subscriber Name Subscriber Birth Date Member ID       REZA HUIZAR 1965 [id number]               Discharge Order (From admission, onward)     Start     Ordered    09/04/20 1410  Discharge patient  Once     Expected Discharge Date:  09/04/20    Expected Discharge Time:  Afternoon    Discharge Disposition:  Home or Self Care    Physician of Record for Attribution - Please select from Treatment Team:  DEAN SPENCE [[id number]]    Review needed by CMO to determine Physician of Record:  No       Question Answer Comment   Physician of Record for Attribution - Please select from Treatment Team DEAN SPENCE    Review needed by CMO to determine Physician of Record No        " 20 1419              Dean Spence MD   Physician   Medicine   Discharge Summary   Incomplete   Date of Service:  20 152   Creation Time:  20            Incomplete             Show:Clear all  [x]Manual[x]Template[]Copied    Added by:  [x]Dean Spence MD    []Miranda for details       [facility] Medicine Services  DISCHARGE SUMMARY     Patient Name: Johanne Lucas  : 1965  MRN: [id number]     Date of Admission: 2020  9:07 PM  Date of Discharge:  2020 (Friday)  Primary Care Physician: Daiana Coyne APRN             Consults      Date and Time Order Name Status Description     2020 0903 Inpatient Nephrology Consult         2020 0327 Inpatient Cardiology Consult Completed            Michel Bautista MD - Nephrology  Rah Atkins MD - Cardiology     Hospital Course      Presenting Problem:   Atrial fibrillation with RVR (CMS/[facility]) [I48.91]  Atrial fibrillation with RVR (CMS/[facility]) [I48.91]           Active Hospital Problems     Diagnosis   POA   • **Atrial fibrillation with RVR (CMS/[facility]) [I48.91]   Yes   • Acute respiratory failure with hypoxia (CMS/[facility]) [J96.01]   Unknown   • Pneumonia [J18.9]   Unknown   • CHF (congestive heart failure) (CMS/[facility]) [I50.9]   Unknown   • HTN (hypertension) [I10]   Unknown   • Anxiety [F41.9]   Unknown   • MILKA (acute kidney injury) (CMS/[facility]) [N17.9]   Unknown   • CKD (chronic kidney disease) stage 4, GFR 15-29 ml/min (CMS/[facility]) [N18.4]   Unknown   • Macrocytic anemia [D53.9]   Unknown   • Renal artery stenosis s/p stent [I70.1]   Unknown   • COPD (chronic obstructive pulmonary disease) (CMS/[facility]) [J44.9]   Unknown   • CAD (coronary artery disease) s/p stent [I25.10]   Unknown   • Tobacco abuse [Z72.0]   Unknown   • Type 2 diabetes mellitus (CMS/[facility]) [E11.9]   Unknown       Resolved Hospital Problems   No resolved problems to display.            Hospital Course:  Johanne Lucas is a 55 y.o. female            Discharge Follow Up Recommendations for outpatient labs/diagnostics:          Day of Discharge      HPI:          Review of Systems     Vital Signs:   Temp:  [97.5 °F (36.4 °C)-98 °F (36.7 °C)] 97.5 °F (36.4 °C)  Heart Rate:  [63-86] 67  Resp:  [16-20] 18  BP: (142-152)/(59-66) 152/62      Physical Exam:       Pertinent  and/or Most Recent Results                Results from last 7 days   Lab Units 09/04/20  0602 09/03/20  0814 09/02/20  0819 09/01/20  1014 08/31/20  1140 08/29/20  0752   WBC 10*3/mm3  --   --  8.52  --  9.27  --    HEMOGLOBIN g/dL  --   --  11.5*  --  11.6*  --    HEMATOCRIT %  --   --  35.9  --  37.1  --    PLATELETS 10*3/mm3  --   --  188  --  198  --    SODIUM mmol/L 138 135* 137 137 140 137   POTASSIUM mmol/L 4.2 4.3 4.5 4.5  4.5 3.4* 3.2*   CHLORIDE mmol/L 102 99 101 98 97* 97*   CO2 mmol/L 23.0 23.0 25.0 25.0 27.0 28.0   BUN mg/dL 81* 80* 78* 75* 70* 65*   CREATININE mg/dL 3.73* 4.45* 4.11* 4.12* 3.83* 2.82*   GLUCOSE mg/dL 167* 182* 187* 236* 90 161*   CALCIUM mg/dL 9.3 9.4 9.3 9.5 9.5 9.7             Invalid input(s): PROT, LABALBU         Invalid input(s): TG, LDLCALC, LDLREALC                               Brief Urine Lab Results  (Last result in the past 365 days)       Color   Clarity   Blood   Leuk Est   Nitrite   Protein   CREAT   Urine HCG         08/30/20 2156                         54.7           08/30/20 2156 Yellow Clear Negative Negative Negative 100 mg/dL (2+)                                Microbiology Results Abnormal      Procedure Component Value - Date/Time     Blood Culture - Blood, Arm, Left [[id number]] Collected:  08/25/20 2118     Lab Status:  Final result Specimen:  Blood from Arm, Left Updated:  08/30/20 2145       Blood Culture No growth at 5 days     Blood Culture - Blood, Arm, Right [[id number]] Collected:  08/25/20 2118     Lab Status:  Final result Specimen:  Blood from Arm, Right Updated:  08/30/20 2145       Blood Culture No growth at 5 days     S.  Pneumo Ag Urine or CSF - Urine, Urine, Clean Catch [[id number]]  (Normal) Collected:  08/26/20 0405     Lab Status:  Final result Specimen:  Urine, Clean Catch Updated:  08/26/20 0911       Strep Pneumo Ag Negative     Legionella Antigen, Urine - Urine, Urine, Clean Catch [[id number]]  (Normal) Collected:  08/26/20 0405     Lab Status:  Final result Specimen:  Urine, Clean Catch Updated:  08/26/20 0910       LEGIONELLA ANTIGEN, URINE Negative     Respiratory Culture - Sputum, Cough [[id number]] Collected:  08/26/20 0700     Lab Status:  Final result Specimen:  Sputum from Cough Updated:  08/26/20 0852       Respiratory Culture Rejected       Gram Stain Many (4+) Epithelial cells per low power field         Rare (1+) WBCs per low power field         Moderate (3+) Mixed bacterial morphotypes seen on Gram Stain     Narrative:        Specimen rejected due to oropharyngeal contamination.     Respiratory Panel PCR w/COVID-19(SARS-CoV-2) [facility] In-House, NP Swab in UTM/VTM, 3-4 HR TAT - Swab, Nasopharynx [[id number]]  (Normal) Collected:  08/26/20 0131     Lab Status:  Final result Specimen:  Swab from Nasopharynx Updated:  08/26/20 0309       ADENOVIRUS, PCR Not Detected       Coronavirus 229E Not Detected       Coronavirus HKU1 Not Detected       Coronavirus NL63 Not Detected       Coronavirus OC43 Not Detected       COVID19 Not Detected       Human Metapneumovirus Not Detected       Human Rhinovirus/Enterovirus Not Detected       Influenza A PCR Not Detected       Influenza A H1 Not Detected       Influenza A H1 2009 PCR Not Detected       Influenza A H3 Not Detected       Influenza B PCR Not Detected       Parainfluenza Virus 1 Not Detected       Parainfluenza Virus 2 Not Detected       Parainfluenza Virus 3 Not Detected       Parainfluenza Virus 4 Not Detected       RSV, PCR Not Detected       Bordetella pertussis pcr Not Detected       Bordetella parapertussis PCR Not Detected       Chlamydophila pneumoniae PCR  Not Detected       Mycoplasma pneumo by PCR Not Detected     Narrative:        Fact sheet for providers: https://docs.[employer]/wp-content/uploads/HPI5247-6732-GJ0.1-EUA-Provider-Fact-Sheet-3.pdf     Fact sheet for patients: https://docs.[employer]/wp-content/uploads/NUC3674-1647-MY9.1-EUA-Patient-Fact-Sheet-1.pdf                      Imaging Results (All)      Procedure Component Value Units Date/Time     CT Chest Without Contrast [[id number]] Collected:  08/25/20 2332       Updated:  08/25/20 2334     Narrative:        CT Chest WO     INDICATION:   Shortness of air and hypoxia today.     TECHNIQUE:   CT of the thorax without IV contrast. Coronal and sagittal reconstructions were obtained.  Radiation dose reduction techniques included automated exposure control or exposure modulation based on body size. Count of known CT and cardiac nuc med studies  performed in previous 12 months: 0.      COMPARISON:   None available.     FINDINGS:  No pleural effusions are seen. The heart is enlarged, and there is a small pericardial effusion measuring up to about 12 mm in thickness dependently. There is atherosclerotic disease and coronary artery disease. No adenopathy is seen. Images through the  upper abdomen show atrophy of the right kidney. There is extensive atherosclerotic disease in the visualized upper abdominal aorta, including the renal arteries.     There is mild bandlike atelectasis in the lower lobes, lingula, and right middle lobe. No definite evidence of acute pneumonia. Imaging features are atypical or uncommonly reported for COVID-19 pneumonia. Alternative diagnoses should be considered.        Impression:           1. Cardiomegaly with a small pericardial effusion.  2. Atherosclerotic disease and coronary artery disease.  3. Multifocal opacities in the lungs have the appearance of atelectasis rather than pneumonia.  4. Right renal atrophy.     Signer Name: Sheldon Melchor MD   Signed: 8/25/2020 11:32 PM    Workstation Name: [facility]    [facility]                  Results for orders placed during the hospital encounter of 08/25/20   Duplex Renal Artery - Bilateral Complete CAR     Narrative EXAMINATION: DUPLEX RENAL ARTERY BILATERAL COMPLETE CAR- 08/27/2020     INDICATION: unilateral small kidney renal insufficiency     TECHNIQUE: Duplex interrogation was performed of the kidneys bilaterally  in both the sagittal and transverse planes.        COMPARISON: NONE     FINDINGS: There is nonvisualization of the right kidney. The left kidney  is somewhat difficult to visualize due to overlying bowel gas. The left  kidney measures in length from pole to pole 11.4 cm. The left hilar vein  is patent. The resistive indices in the left kidney are mildly elevated  at 0.8. The peak systolic velocity of the abdominal aorta is 227 cm/s.  The left renal to aorta ratio is 0.42.            Impression Abnormal parenchymal blood flow pattern seen within the left  kidney. No evidence of renal artery stenosis on the left. There is  nonvisualization of the right kidney.     D:  08/27/2020  E:  08/27/2020        This report was finalized on 8/27/2020 4:53 PM by Dr. Isatu Hadley MD.                 Results for orders placed during the hospital encounter of 08/25/20   Duplex Renal Artery - Bilateral Complete CAR     Narrative EXAMINATION: DUPLEX RENAL ARTERY BILATERAL COMPLETE CAR- 08/27/2020     INDICATION: unilateral small kidney renal insufficiency     TECHNIQUE: Duplex interrogation was performed of the kidneys bilaterally  in both the sagittal and transverse planes.        COMPARISON: NONE     FINDINGS: There is nonvisualization of the right kidney. The left kidney  is somewhat difficult to visualize due to overlying bowel gas. The left  kidney measures in length from pole to pole 11.4 cm. The left hilar vein  is patent. The resistive indices in the left kidney are mildly elevated  at 0.8. The peak systolic  velocity of the abdominal aorta is 227 cm/s.  The left renal to aorta ratio is 0.42.            Impression Abnormal parenchymal blood flow pattern seen within the left  kidney. No evidence of renal artery stenosis on the left. There is  nonvisualization of the right kidney.     D:  08/27/2020  E:  08/27/2020        This report was finalized on 8/27/2020 4:53 PM by Dr. Isatu Hadley MD.                 Results for orders placed during the hospital encounter of 08/25/20   Transthoracic Echo Complete With Contrast if Necessary Per Protocol     Narrative · Left ventricular wall thickness is consistent with mild-to-moderate   concentric hypertrophy.  · Left ventricular systolic function is normal.  · Left ventricular diastolic dysfunction.  · Right ventricular cavity is moderately dilated.  · Left atrial cavity size is mildly dilated.  · Mild mitral valve regurgitation is present  · Mild tricuspid valve regurgitation is present.  · Calculated right ventricular systolic pressure from tricuspid   regurgitation is 31 mmHg.  · Mild pulmonic valve regurgitation is present.  · The aortic valve has a small non mobile echogenic thickening of the L   coronary cusp. This could represent a healed vegetation.            Plan for Follow-up of Pending Labs/Results:   Discharge Details               Discharge Medications            New Medications      Instructions Start Date   amLODIPine 5 MG tablet  Commonly known as:  NORVASC    5 mg, Oral, Every 24 Hours Scheduled    Start Date:  September 5, 2020      docusate sodium 100 MG capsule    100 mg, Oral, Daily    Start Date:  September 5, 2020      insulin detemir 100 UNIT/ML injection  Commonly known as:  LEVEMIR    10 Units, Subcutaneous, Every 24 Hours Scheduled    Start Date:  September 5, 2020      nicotine 21 MG/24HR patch  Commonly known as:  NICODERM CQ    1 patch, Transdermal, Every 24 Hours Scheduled    Start Date:  September 5, 2020      terazosin 10 MG  capsule  Commonly known as:  HYTRIN    10 mg, Oral, Every 12 Hours Scheduled                      Changes to Medications      Instructions Start Date   hydrALAZINE 100 MG tablet  Commonly known as:  APRESOLINE  What changed:  when to take this    100 mg, Oral, Every 8 Hours Scheduled        metoprolol tartrate 100 MG tablet  Commonly known as:  LOPRESSOR  What changed:    · medication strength  · how much to take  · when to take this    100 mg, Oral, Every 12 Hours Scheduled                      Continue These Medications      Instructions Start Date   albuterol sulfate  (90 Base) MCG/ACT inhaler  Commonly known as:  PROVENTIL HFA;VENTOLIN HFA;PROAIR HFA    2 puffs, Inhalation, Every 4 Hours PRN        ALPRAZolam 1 MG tablet  Commonly known as:  XANAX    1 mg, Oral, 3 Times Daily PRN        apixaban 5 MG tablet tablet  Commonly known as:  ELIQUIS    5 mg, Oral, 2 Times Daily        aspirin 81 MG chewable tablet    81 mg, Oral, Daily        cholecalciferol 25 MCG (1000 UT) tablet  Commonly known as:  VITAMIN D3    1,000 Units, Oral, Daily        famotidine 20 MG tablet  Commonly known as:  PEPCID    20 mg, Oral, 2 Times Daily        HYDROcodone-acetaminophen 7.5-325 MG per tablet  Commonly known as:  NORCO    1 tablet, Oral, Every 8 Hours PRN        Insulin Lispro 100 UNIT/ML injection pen  Commonly known as:  ADMELOG SOLOSTAR    Subcutaneous, 3 Times Daily Before Meals, Before each meal take 1 unit for every 10 carbs         ipratropium-albuterol 0.5-2.5 mg/3 ml nebulizer  Commonly known as:  DUO-NEB    3 mL, Nebulization, 4 Times Daily PRN        lactulose 10 GM/15ML solution  Commonly known as:  CHRONULAC    20 g, Oral, 2 Times Daily PRN        pravastatin 40 MG tablet  Commonly known as:  PRAVACHOL    80 mg, Oral, Daily            Stop These Medications    doxazosin 1 MG tablet  Commonly known as:  CARDURA      hydroCHLOROthiazide 25 MG tablet  Commonly known as:  HYDRODIURIL      Insulin Glargine 100  UNIT/ML injection pen  Commonly known as:  BASAGLAR KWIKPEN                      Allergies   Allergen Reactions   • Codeine Shortness Of Breath   • Sulfa Antibiotics Shortness Of Breath   • Nitroglycerin Swelling and Cough       Caused tongue and lip swelling, as well as coughing.             Discharge Disposition:  Home or Self Care     Diet:  Hospital:      Diet Order   Procedures   • Diet Regular; Cardiac, Consistent Carbohydrate, Renal         Activity:     Restrictions or Other Recommendations:         CODE STATUS:        Code Status and Medical Interventions:   Ordered at: 08/26/20 0055     Level Of Support Discussed With:     Patient     Code Status:     CPR     Medical Interventions (Level of Support Prior to Arrest):     Full         No future appointments.          Additional Instructions for the Follow-ups that You Need to Schedule      Discharge Follow-up with PCP   As directed         Currently Documented PCP:    Daiana Coyne APRN    PCP Phone Number:    [phone]      Follow Up Details:  Primary Care Doctor - 1 week - outpatient referral for sleep study on chronic pain medication           Discharge Follow-up with Specialty: Nephrology - 1 week; 1 Week   As directed        Specialty:  Nephrology - 1 week    Follow Up:  1 Week    Follow Up Details:  UA / Renal Function           Discharge Follow-up with Specialty: Sleep Medicine; 2 Weeks   As directed        Specialty:  Sleep Medicine    Follow Up:  2 Weeks    Follow Up Details:  needs outpatient sleep study on long standing - Norco 7.5                            Electronically signed by Dean Spence MD, 09/04/20, 3:27 PM.        Time Spent on Discharge:  I spent    minutes on this discharge activity which included: face-to-face encounter with the patient, reviewing the data in the system, coordination of the care with the nursing staff as well as consultants, documentation, and entering orders.                        ED to Hosp-Admission  (Discharged) on 8/25/2020          Detailed Report

## 2020-09-14 ENCOUNTER — READMISSION MANAGEMENT (OUTPATIENT)
Dept: CALL CENTER | Facility: HOSPITAL | Age: 55
End: 2020-09-14

## 2020-09-14 NOTE — OUTREACH NOTE
COPD/PN Week 1 Survey      Responses   [facility] patient discharged from?  [place]   COVID-19 Test Status  Negative   Does the patient have one of the following disease processes/diagnoses(primary or secondary)?  COPD/Pneumonia   Is there a successful TCM telephone encounter documented?  No   Was the primary reason for admission:  Pneumonia   Week 1 attempt successful?  Yes   Call start time  1242   Call end time  1244   Discharge diagnosis  Atrial fibrillation with RVR,  pneumonia    Is patient permission given to speak with other caregiver?  Yes   List who call center can speak with  victor manuel Mcfarland reviewed with patient/caregiver?  Yes   Is the patient having any side effects they believe may be caused by any medication additions or changes?  No   Does the patient have all medications ordered at discharge?  Yes   Is the patient taking all medications as directed (includes completed medication regime)?  Yes   Does the patient have a primary care provider?   Yes   Does the patient have an appointment with their PCP or pulmonologist within 7 days of discharge?  Yes   Has the patient kept scheduled appointments due by today?  Yes   Has home health visited the patient within 72 hours of discharge?  N/A   Pulse Ox monitoring  Intermittent [88% at night, 94 to 95 % in the day. ]   Pulse Ox device source  Patient   Did the patient receive a copy of their discharge instructions?  Yes   Nursing interventions  Reviewed instructions with patient   What is the patient's perception of their health status since discharge?  Improving   Nursing Interventions  Nurse provided patient education   Are the patient's immunizations up to date?   Yes   Nursing interventions  Educated on importance of maintaining up to date immunizations as advised by provider   Is the patient/caregiver able to teach back the hierarchy of who to call/visit for symptoms/problems? PCP, Specialist, Home health nurse, Urgent Care, ED, 911  Yes    Is the patient/caregiver able to teach back signs and symptoms of worsening condition:  Fever/chills, Shortness of breath, Chest pain   Is the patient/caregiver able to teach back importance of completing antibiotic course of treatment?  Yes   Week 1 call completed?  Yes          Glenis Curry RN

## 2020-09-21 ENCOUNTER — READMISSION MANAGEMENT (OUTPATIENT)
Dept: CALL CENTER | Facility: HOSPITAL | Age: 55
End: 2020-09-21

## 2020-09-21 NOTE — OUTREACH NOTE
COPD/PN Week 2 Survey      Responses   [facility] patient discharged from?  [place]   COVID-19 Test Status  Negative   Does the patient have one of the following disease processes/diagnoses(primary or secondary)?  COPD/Pneumonia   Was the primary reason for admission:  Pneumonia   Week 2 attempt successful?  No   Unsuccessful attempts  Attempt 1          Mariya Casillas RN

## 2020-09-24 ENCOUNTER — READMISSION MANAGEMENT (OUTPATIENT)
Dept: CALL CENTER | Facility: HOSPITAL | Age: 55
End: 2020-09-24

## 2020-09-24 NOTE — OUTREACH NOTE
COPD/PN Week 2 Survey      Responses   [facility] [place] patient discharged from?  [place]   COVID-19 Test Status  Negative   Does the patient have one of the following disease processes/diagnoses(primary or secondary)?  COPD/Pneumonia   Was the primary reason for admission:  Pneumonia   Week 2 attempt successful?  No   Unsuccessful attempts  Attempt 2          Ira Rowan RN

## 2020-10-01 ENCOUNTER — READMISSION MANAGEMENT (OUTPATIENT)
Dept: CALL CENTER | Facility: HOSPITAL | Age: 55
End: 2020-10-01

## 2020-10-01 NOTE — OUTREACH NOTE
COPD/PN Week 3 Survey      Responses   [facility] patient discharged from?  [place]   Does the patient have one of the following disease processes/diagnoses(primary or secondary)?  COPD/Pneumonia   Week 3 attempt successful?  Yes   Call start time  1446   Call end time  1450   Meds reviewed with patient/caregiver?  Yes   Is the patient taking all medications as directed (includes completed medication regime)?  Yes   Has the patient kept scheduled appointments due by today?  Yes   Comments  Has seen nephrology and will see Cardiology next week   Pulse Ox monitoring  Intermittent   O2 Sat comments  sats usually 95-97% on room air   Psychosocial issues?  No   Comments  cane or walker as needed   What is the patient's perception of their health status since discharge?  Improving   Is the patient/caregiver able to teach back signs and symptoms of worsening condition:  Fever/chills, Shortness of breath, Chest pain   Is the patient/caregiver able to teach back importance of completing antibiotic course of treatment?  Yes   Week 3 call completed?  Yes   Wrap up additional comments  She states is feeling better, just fatigue with the new bp medications, HR is running afib at [id number] has been max rate now  running 60-70's regular           Alissa Paul RN

## 2020-10-12 ENCOUNTER — READMISSION MANAGEMENT (OUTPATIENT)
Dept: CALL CENTER | Facility: HOSPITAL | Age: 55
End: 2020-10-12

## 2020-10-12 NOTE — OUTREACH NOTE
COPD/PN Week 4 Survey      Responses   [facility] patient discharged from?  [place]   Does the patient have one of the following disease processes/diagnoses(primary or secondary)?  COPD/Pneumonia   Was the primary reason for admission:  Pneumonia   Week 4 attempt successful?  No   Call start time  1200          Hilary Humphries RN

## 2021-05-10 PROBLEM — N18.5 ANEMIA OF CHRONIC RENAL FAILURE, STAGE 5 (HCC): Status: ACTIVE | Noted: 2021-05-10

## 2021-05-10 PROBLEM — D63.1 ANEMIA OF CHRONIC RENAL FAILURE, STAGE 5 (HCC): Status: ACTIVE | Noted: 2021-05-10

## 2021-05-14 ENCOUNTER — HOSPITAL ENCOUNTER (OUTPATIENT)
Dept: ONCOLOGY | Facility: HOSPITAL | Age: 56
End: 2021-05-14

## 2023-07-19 ENCOUNTER — HOSPITAL ENCOUNTER (EMERGENCY)
Age: 58
Discharge: HOME | End: 2023-07-19
Attending: EMERGENCY MEDICINE
Payer: COMMERCIAL

## 2023-07-19 VITALS
HEART RATE: 80 BPM | OXYGEN SATURATION: 94 % | RESPIRATION RATE: 16 BRPM | WEIGHT: 126 LBS | DIASTOLIC BLOOD PRESSURE: 70 MMHG | SYSTOLIC BLOOD PRESSURE: 128 MMHG | HEIGHT: 65 IN | TEMPERATURE: 98 F | BODY MASS INDEX: 20.99 KG/M2

## 2023-07-19 DIAGNOSIS — S51.811A SKIN TEAR OF RIGHT FOREARM WITHOUT COMPLICATION, INITIAL ENCOUNTER: Primary | ICD-10-CM

## 2023-07-19 PROCEDURE — 99282 EMERGENCY DEPT VISIT SF MDM: CPT

## 2023-07-19 RX ORDER — ALPRAZOLAM 0.5 MG/1
0.5 TABLET ORAL 2 TIMES DAILY
COMMUNITY

## 2023-07-19 RX ORDER — HYDROCODONE BITARTRATE AND ACETAMINOPHEN 7.5; 325 MG/1; MG/1
1 TABLET ORAL EVERY 6 HOURS PRN
COMMUNITY

## 2023-07-19 RX ORDER — METOPROLOL TARTRATE 50 MG/1
50 TABLET, FILM COATED ORAL 2 TIMES DAILY
COMMUNITY

## 2023-07-19 RX ORDER — SEVELAMER CARBONATE 800 MG/1
1 TABLET, FILM COATED ORAL
COMMUNITY

## 2023-07-19 RX ORDER — ASPIRIN 81 MG/1
81 TABLET, CHEWABLE ORAL DAILY
COMMUNITY

## 2023-07-19 RX ORDER — AMLODIPINE BESYLATE 5 MG/1
5 TABLET ORAL DAILY
COMMUNITY

## 2023-07-19 RX ORDER — PRAVASTATIN SODIUM 80 MG/1
80 TABLET ORAL DAILY
COMMUNITY

## 2023-07-19 NOTE — ED PROVIDER NOTES
Emergency Department Encounter    Patient: Marquis Tello  MRN: [id number]  : 1965  Date of Evaluation: 2023  ED Provider:  Kristopher Londono MD    Triage Chief Complaint:   Wound Check (Reports skin tear to right forearm 2 days)    [place]:  Marquis Tello is a 63 y.o. female that presents with concern for skin tear that will not stop bleeding, reports her dialysis nurses sent her to be evaluated. She had changed the bandage 3 times today. She struck her arm yesterday, had skin tear and reports that she will bleed in the bandage and then fill the bandage up and has to replace it. It is not dripping around the bandage, is not pulsatile. Denies pain. Denies other injury. Not on blood thinners. ROS - see HPI, below listed is current ROS at time of my eval:  4 systems reviewed and negative except as above. Past Medical History:   Diagnosis Date    Chronic kidney disease      Past Surgical History:   Procedure Laterality Date    AV FISTULA PLACEMENT      KIDNEY REMOVAL       No family history on file. Social History     Socioeconomic History    Marital status:      Spouse name: Not on file    Number of children: Not on file    Years of education: Not on file    Highest education level: Not on file   Occupational History    Not on file   Tobacco Use    Smoking status: Every Day     Types: Cigarettes    Smokeless tobacco: Never   Substance and Sexual Activity    Alcohol use: Never    Drug use: Never    Sexual activity: Not on file   Other Topics Concern    Not on file   Social History Narrative    Not on file     Social Determinants of Health     Financial Resource Strain: Not on file   Food Insecurity: Not on file   Transportation Needs: Not on file   Physical Activity: Not on file   Stress: Not on file   Social Connections: Not on file   Intimate Partner Violence: Not on file   Housing Stability: Not on file     No current facility-administered medications for this encounter.      Current

## 2023-12-05 ENCOUNTER — OFFICE (OUTPATIENT)
Dept: URBAN - METROPOLITAN AREA CLINIC 18 | Facility: CLINIC | Age: 58
End: 2023-12-05
Payer: COMMERCIAL

## 2023-12-05 VITALS
WEIGHT: 159 LBS | SYSTOLIC BLOOD PRESSURE: 140 MMHG | HEART RATE: 68 BPM | HEIGHT: 65 IN | OXYGEN SATURATION: 93 % | DIASTOLIC BLOOD PRESSURE: 80 MMHG

## 2023-12-05 DIAGNOSIS — R79.89 OTHER SPECIFIED ABNORMAL FINDINGS OF BLOOD CHEMISTRY: ICD-10-CM

## 2023-12-05 DIAGNOSIS — K74.60 UNSPECIFIED CIRRHOSIS OF LIVER: ICD-10-CM

## 2023-12-05 PROCEDURE — 99204 OFFICE O/P NEW MOD 45 MIN: CPT | Performed by: INTERNAL MEDICINE

## 2023-12-13 LAB
ACTIN ANTIBODY (IGG): <20 EIA
ANA AB REFLEX EIA TESTING: ANTI-DNA AB: <201 EIA
ANA AB REFLEX EIA TESTING: ANTI-RNP: <20 EIA
ANA AB REFLEX EIA TESTING: ANTI-SM: <20 EIA
ANA AB REFLEX EIA TESTING: CENTROMERE AB: <20 EIA
ANA AB REFLEX EIA TESTING: HISTONE AB: <1 EIA
ANA AB REFLEX EIA TESTING: JO-1 ANTIBODY: <20 EIA
ANA AB REFLEX EIA TESTING: SCLERODERMA AB: <20 EIA
ANA AB REFLEX EIA TESTING: SJOGRENS ANTIBODIES (SSA): <20 EIA
ANA AB REFLEX EIA TESTING: SJOGRENS ANTIBODIES (SSB): <20 EIA
ANTI-MITOCHONDRIAL AB: NEGATIVE
ANTINUCLEAR AB BY IFA WITH REFLEX: ANA PATTERN: (no result)
ANTINUCLEAR AB BY IFA WITH REFLEX: ANA SCREEN: POSITIVE
ANTINUCLEAR AB BY IFA WITH REFLEX: ANA TITER: (no result)
BASIC METABOLIC PANEL: BLOOD UREA NITROGEN: 87 MG/DL — HIGH
BASIC METABOLIC PANEL: BUN/CREAT RATIO: 12
BASIC METABOLIC PANEL: CALCIUM: 9.6 MG/DL
BASIC METABOLIC PANEL: CHLORIDE: 101 MEQ/L
BASIC METABOLIC PANEL: CO2: 21 MEQ/L
BASIC METABOLIC PANEL: CREATININE: 7 MG/DL — HIGH
BASIC METABOLIC PANEL: FASTING STATUS: (no result)
BASIC METABOLIC PANEL: GLOMERULAR FILTRATION RATE (GFR): 6 MLS/MIN/1.73M2 — LOW
BASIC METABOLIC PANEL: GLUCOSE,RANDOM: 130 MG/DL — HIGH
BASIC METABOLIC PANEL: POTASSIUM: 5.3 MEQ/L
BASIC METABOLIC PANEL: SODIUM: 139 MEQ/L
CBC, PLATELET CT  AND  DIFF: ABS BASOPHIL: 0.1 K/UL
CBC, PLATELET CT  AND  DIFF: ABS EOSINOPHIL: 0.3 K/UL
CBC, PLATELET CT  AND  DIFF: ABS IMMATURE GRANS: 0 K/UL
CBC, PLATELET CT  AND  DIFF: ABS LYMPHOCYTE: 1.4 K/UL
CBC, PLATELET CT  AND  DIFF: ABS MONOCYTE: 1 K/UL
CBC, PLATELET CT  AND  DIFF: ABS NEUTROPHIL: 4.9 K/UL
CBC, PLATELET CT  AND  DIFF: BASOPHIL: 0.8 %
CBC, PLATELET CT  AND  DIFF: DIFFERENTIAL: (no result)
CBC, PLATELET CT  AND  DIFF: EOSINOPHIL: 3.3 %
CBC, PLATELET CT  AND  DIFF: HEMATOCRIT: 31.6 % — LOW
CBC, PLATELET CT  AND  DIFF: HEMOGLOBIN: 10.4 G/DL — LOW
CBC, PLATELET CT  AND  DIFF: IMMATURE GRANULOCYTES: 0.4 %
CBC, PLATELET CT  AND  DIFF: LYMPHOCYTE: 18.5 %
CBC, PLATELET CT  AND  DIFF: MCH: 32.5 PG
CBC, PLATELET CT  AND  DIFF: MCHC: 32.9 G/DL
CBC, PLATELET CT  AND  DIFF: MCV: 98.8 FL
CBC, PLATELET CT  AND  DIFF: MONOCYTE: 13.1 % — HIGH
CBC, PLATELET CT  AND  DIFF: MPV: 11.3 FL
CBC, PLATELET CT  AND  DIFF: NEUTROPHIL: 63.9 %
CBC, PLATELET CT  AND  DIFF: NRBCS: 0 /100 WBC
CBC, PLATELET CT  AND  DIFF: PLATELET COUNT: 150 K/UL
CBC, PLATELET CT  AND  DIFF: RBC: 3.2 M/UL — LOW
CBC, PLATELET CT  AND  DIFF: RDW: 14.3 %
CBC, PLATELET CT  AND  DIFF: WBC COUNT: 7.6 K/UL
FERRITIN: 944 NG/ML — HIGH
HEPATIC FUNCTION PANEL: A/G RATIO: 1.4 RATIO
HEPATIC FUNCTION PANEL: ALBUMIN: 4 G/DL
HEPATIC FUNCTION PANEL: ALK PHOSPHATASE: 120 U/L
HEPATIC FUNCTION PANEL: ALT: 13 U/L
HEPATIC FUNCTION PANEL: AST: 17 U/L
HEPATIC FUNCTION PANEL: BILIRUBIN, INDIRECT: (no result) MG/DL
HEPATIC FUNCTION PANEL: BILIRUBIN,DIRECT: <0.2 MG/DL
HEPATIC FUNCTION PANEL: BILIRUBIN,TOTAL: 0.2 MG/DL
HEPATIC FUNCTION PANEL: GLOBULIN: 2.9 G/DL
HEPATIC FUNCTION PANEL: TOTAL PROTEIN: 6.9 G/DL
HEREDITARY HEMOCHROMATOSIS DNA MUTATION ANAL: DNA MUTATION ANALYSIS: (no result)
IGG: 1014 MG/DL
IRON PROFILE: IRON BINDING CAPACITY: 280 MCG/DL
IRON PROFILE: IRON SATURATION: 28 %
IRON PROFILE: IRON: 77 MCG/DL
LIPASE: 49 U/L
PROTIME: INR: 1.1
PROTIME: PT: 13.9 SEC

## 2024-01-02 ENCOUNTER — ON CAMPUS - OUTPATIENT (OUTPATIENT)
Dept: URBAN - METROPOLITAN AREA MEDICAL CENTER 4 | Facility: MEDICAL CENTER | Age: 59
End: 2024-01-02
Payer: COMMERCIAL

## 2024-01-02 DIAGNOSIS — K74.60 UNSPECIFIED CIRRHOSIS OF LIVER: ICD-10-CM

## 2024-01-02 DIAGNOSIS — K31.89 OTHER DISEASES OF STOMACH AND DUODENUM: ICD-10-CM

## 2024-01-02 DIAGNOSIS — K44.9 DIAPHRAGMATIC HERNIA WITHOUT OBSTRUCTION OR GANGRENE: ICD-10-CM

## 2024-01-02 PROCEDURE — 43239 EGD BIOPSY SINGLE/MULTIPLE: CPT | Performed by: INTERNAL MEDICINE

## 2024-01-12 ENCOUNTER — HOSPITAL ENCOUNTER (EMERGENCY)
Age: 59
Discharge: HOME | End: 2024-01-12
Attending: EMERGENCY MEDICINE
Payer: COMMERCIAL

## 2024-01-12 VITALS
HEART RATE: 91 BPM | RESPIRATION RATE: 18 BRPM | HEIGHT: 65 IN | SYSTOLIC BLOOD PRESSURE: 185 MMHG | OXYGEN SATURATION: 95 % | TEMPERATURE: 97.6 F | DIASTOLIC BLOOD PRESSURE: 78 MMHG | BODY MASS INDEX: 26.82 KG/M2 | WEIGHT: 161 LBS

## 2024-01-12 DIAGNOSIS — Z13.9 ENCOUNTER FOR MEDICAL SCREENING EXAMINATION: Primary | ICD-10-CM

## 2024-01-12 LAB
ANION GAP SERPL CALCULATED.3IONS-SCNC: 18 MMOL/L (ref 7–16)
BUN SERPL-MCNC: 90 MG/DL (ref 6–23)
CALCIUM SERPL-MCNC: 8.8 MG/DL (ref 8.3–10.6)
CHLORIDE BLD-SCNC: 104 MMOL/L (ref 99–110)
CO2: 19 MMOL/L (ref 21–32)
CREAT SERPL-MCNC: 8.7 MG/DL (ref 0.6–1.1)
GFR SERPL CREATININE-BSD FRML MDRD: 5 ML/MIN/1.73M2
GLUCOSE SERPL-MCNC: 99 MG/DL (ref 70–99)
POTASSIUM SERPL-SCNC: 4.3 MMOL/L (ref 3.5–5.1)
SODIUM BLD-SCNC: 141 MMOL/L (ref 135–145)

## 2024-01-12 PROCEDURE — 99283 EMERGENCY DEPT VISIT LOW MDM: CPT

## 2024-01-12 PROCEDURE — 80048 BASIC METABOLIC PNL TOTAL CA: CPT

## 2024-01-12 ASSESSMENT — LIFESTYLE VARIABLES
HOW MANY STANDARD DRINKS CONTAINING ALCOHOL DO YOU HAVE ON A TYPICAL DAY: PATIENT DOES NOT DRINK
HOW OFTEN DO YOU HAVE A DRINK CONTAINING ALCOHOL: NEVER

## 2024-01-12 ASSESSMENT — ENCOUNTER SYMPTOMS
GASTROINTESTINAL NEGATIVE: 1
RESPIRATORY NEGATIVE: 1
EYES NEGATIVE: 1

## 2024-01-13 NOTE — DISCHARGE INSTRUCTIONS
You lab work was normal with exception of BUN Creatine which were elevated but that is expected for dialysis patient. Your potassium is normal and remained electrolytes are acceptable

## 2024-01-13 NOTE — ED PROVIDER NOTES
prescribed today, patient  was educated about indications for the medication, how to take the medication, and potential side effects of the medication.    I am the Primary Clinician of Record.        Final Impression    1. Encounter for medical screening examination              Isael Olsen,   01/12/24 4166

## 2024-02-23 ENCOUNTER — HOSPITAL ENCOUNTER (EMERGENCY)
Age: 59
Discharge: HOME | End: 2024-02-23
Attending: EMERGENCY MEDICINE
Payer: COMMERCIAL

## 2024-02-23 VITALS
OXYGEN SATURATION: 91 % | SYSTOLIC BLOOD PRESSURE: 173 MMHG | HEART RATE: 64 BPM | RESPIRATION RATE: 17 BRPM | TEMPERATURE: 97.6 F | DIASTOLIC BLOOD PRESSURE: 78 MMHG

## 2024-02-23 DIAGNOSIS — M54.10 RADICULOPATHY, UNSPECIFIED SPINAL REGION: Primary | ICD-10-CM

## 2024-02-23 PROCEDURE — 6360000002 HC RX W HCPCS: Performed by: EMERGENCY MEDICINE

## 2024-02-23 PROCEDURE — 96372 THER/PROPH/DIAG INJ SC/IM: CPT

## 2024-02-23 PROCEDURE — 99284 EMERGENCY DEPT VISIT MOD MDM: CPT

## 2024-02-23 RX ORDER — METHOCARBAMOL 500 MG/1
1000 TABLET, FILM COATED ORAL 4 TIMES DAILY
Qty: 80 TABLET | Refills: 0 | Status: SHIPPED | OUTPATIENT
Start: 2024-02-23 | End: 2024-03-04

## 2024-02-23 RX ORDER — DEXAMETHASONE SODIUM PHOSPHATE 4 MG/ML
8 INJECTION, SOLUTION INTRA-ARTICULAR; INTRALESIONAL; INTRAMUSCULAR; INTRAVENOUS; SOFT TISSUE ONCE
Status: COMPLETED | OUTPATIENT
Start: 2024-02-23 | End: 2024-02-23

## 2024-02-23 RX ADMIN — DEXAMETHASONE SODIUM PHOSPHATE 8 MG: 4 INJECTION, SOLUTION INTRAMUSCULAR; INTRAVENOUS at 15:28

## 2024-02-23 ASSESSMENT — PAIN DESCRIPTION - ORIENTATION: ORIENTATION: RIGHT;LEFT

## 2024-02-23 ASSESSMENT — PAIN - FUNCTIONAL ASSESSMENT
PAIN_FUNCTIONAL_ASSESSMENT: ACTIVITIES ARE NOT PREVENTED
PAIN_FUNCTIONAL_ASSESSMENT: 0-10

## 2024-02-23 ASSESSMENT — PAIN DESCRIPTION - PAIN TYPE: TYPE: ACUTE PAIN

## 2024-02-23 ASSESSMENT — PAIN DESCRIPTION - DESCRIPTORS: DESCRIPTORS: ACHING

## 2024-02-23 ASSESSMENT — PAIN DESCRIPTION - LOCATION: LOCATION: HIP

## 2024-02-23 ASSESSMENT — PAIN SCALES - GENERAL: PAINLEVEL_OUTOF10: 8

## 2024-02-23 NOTE — ED PROVIDER NOTES
Emergency Department Encounter    Patient: Divya Albert  MRN: [id number]  : 1965  Date of Evaluation: 2024  ED Provider:  Brian Cleveland MD    MDM:    Clinical Impression:  1. Radiculopathy, unspecified spinal region          Triage Chief Complaint: Hip Pain (left hip pain since  when she was traveling to [facility]. States R hip was bothering her on wed. )      I completed a structured, evidence-based clinical evaluation to screen for acute emergent condition that poses a threat to life or bodily function.      Diagnostic studies/Differential diagnosis included: (with independent interpretations \"as interpreted by me\" and tests considered but not performed) Patient presented with musculoskeletal complaints.  Based on patient's presentation, history and physical exam presentation appears most consistent with a radiculopathy.  I do not suspect acute fracture or dislocation.  Imaging is not indicated at this time.  No evidence of acute neurologic, vascular, infectious emergency involving the spine.  Patient does not have any evidence of compartment syndrome, necrotizing process, deep venous thrombosis, or neurovascular deficits.  The patient understands that at this time there is no evidence for a more significant underlying process, and that early in a process of such an injury and initial workup can be falsely negative.  Patient's symptoms will be treated symptomatically, prescriptions will be provided, they will be discharged to follow-up as an outpatient, they understand and agree with the plan, return warnings given.      I considered the following moderate comorbidities in the care of this patient:   Patient  has a past medical history of Chronic kidney disease.     Medications ordered in the ED:  ED Medication Orders (From admission, onward)      Start Ordered     Status Ordering Provider    24 1530 24 1519  dexAMETHasone (DECADRON) injection 8 mg  ONCE         Last MAR action: Given -  Patient states that symptoms started after taking a trip to [place] and sitting for prolonged period of time.  No lower extremity edema.  No back red flags.      Physical Exam:  Triage VS:    ED Triage Vitals [02/23/24 1510]   Enc Vitals Group      BP (!) 173/78      Pulse 64      Respirations 17      Temp 97.6 °F (36.4 °C)      Temp src       SpO2 91 %      Weight       Height       Head Circumference       Peak Flow       Pain Score       Pain Loc       Pain Edu?       Excl. in GC?          General appearance:  No acute distress.   Skin:  Warm. Dry.   Eye:  Extraocular movements intact.     Ears, nose, mouth and throat:  Oral mucosa moist   Neck:  Trachea midline.   Extremity:  No swelling.  Normal ROM.  No bony tenderness to palpation of the bilateral lower extremities.  Pelvis stable.  Hips nontender.  Normal range of motion of the bilateral hips.  Patient has palpable dorsalis pedis pulse in the bilateral lower extremities.  Patient is neurovascularly intact in the bilateral lower extremities.     Heart:  Regular rate and rhythm, normal S1 & S2, no extra heart sounds.    Perfusion:  intact  Respiratory:  Lungs clear to auscultation bilaterally.  Respirations nonlabored.     Abdominal:  Soft.  Nontender.  Non distended.  Back:  No tenderness to palpation throughout the back  Neurological:  Alert and oriented times 3.  No focal neuro deficits.             Psychiatric:  Appropriate    Past Medical History:   Diagnosis Date    Chronic kidney disease      Past Surgical History:   Procedure Laterality Date    AV FISTULA PLACEMENT      KIDNEY REMOVAL      PACEMAKER INSERTION  12/2022     History reviewed. No pertinent family history.  Social History     Socioeconomic History    Marital status:      Spouse name: Not on file    Number of children: Not on file    Years of education: Not on file    Highest education level: Not on file   Occupational History    Not on file   Tobacco Use    Smoking status: Every Day

## 2024-02-23 NOTE — DISCHARGE INSTRUCTIONS
Return immediately to the emergency department if you experience new or worsened symptoms, increased pain, chest pain or shortness of breath, weakness or changes in sensation, urinary symptoms, or for any other concerns.

## 2024-02-23 NOTE — ED NOTES
Discharge instructions reviewed with patient. Medications and follow up were discussed. Patient denies further questions and verbalizes understanding

## 2024-03-18 ENCOUNTER — OFFICE (OUTPATIENT)
Dept: URBAN - METROPOLITAN AREA CLINIC 18 | Facility: CLINIC | Age: 59
End: 2024-03-18
Payer: COMMERCIAL

## 2024-03-18 VITALS
HEIGHT: 65 IN | WEIGHT: 184 LBS | HEART RATE: 64 BPM | DIASTOLIC BLOOD PRESSURE: 62 MMHG | OXYGEN SATURATION: 90 % | SYSTOLIC BLOOD PRESSURE: 100 MMHG

## 2024-03-18 DIAGNOSIS — K20.90 ESOPHAGITIS, UNSPECIFIED WITHOUT BLEEDING: ICD-10-CM

## 2024-03-18 DIAGNOSIS — K74.60 UNSPECIFIED CIRRHOSIS OF LIVER: ICD-10-CM

## 2024-03-18 DIAGNOSIS — K31.A0 GASTRIC INTESTINAL METAPLASIA, UNSPECIFIED: ICD-10-CM

## 2024-03-18 DIAGNOSIS — N18.6 END STAGE RENAL DISEASE: ICD-10-CM

## 2024-03-18 DIAGNOSIS — R79.89 OTHER SPECIFIED ABNORMAL FINDINGS OF BLOOD CHEMISTRY: ICD-10-CM

## 2024-03-18 DIAGNOSIS — R11.0 NAUSEA: ICD-10-CM

## 2024-03-18 DIAGNOSIS — R10.30 LOWER ABDOMINAL PAIN, UNSPECIFIED: ICD-10-CM

## 2024-03-18 PROCEDURE — 99215 OFFICE O/P EST HI 40 MIN: CPT | Performed by: INTERNAL MEDICINE

## 2024-03-18 RX ORDER — DOCUSATE SODIUM 100 MG/1
CAPSULE ORAL
Qty: 60 | Refills: 1 | Status: ACTIVE

## 2024-03-18 RX ORDER — PANTOPRAZOLE 40 MG/1
TABLET, DELAYED RELEASE ORAL
Qty: 60 | Status: COMPLETED
End: 2024-03-18

## 2024-03-18 RX ORDER — FAMOTIDINE 40 MG/1
40 TABLET, FILM COATED ORAL
Qty: 90 | Refills: 2 | Status: ACTIVE

## 2024-05-08 ENCOUNTER — HOSPITAL ENCOUNTER (EMERGENCY)
Age: 59
Discharge: HOME | End: 2024-05-08
Attending: EMERGENCY MEDICINE
Payer: COMMERCIAL

## 2024-05-08 VITALS
HEIGHT: 65 IN | WEIGHT: 190 LBS | DIASTOLIC BLOOD PRESSURE: 52 MMHG | HEART RATE: 62 BPM | TEMPERATURE: 98.2 F | OXYGEN SATURATION: 94 % | SYSTOLIC BLOOD PRESSURE: 154 MMHG | RESPIRATION RATE: 16 BRPM | BODY MASS INDEX: 31.65 KG/M2

## 2024-05-08 DIAGNOSIS — R25.1 TREMOR: Primary | ICD-10-CM

## 2024-05-08 LAB
ALBUMIN SERPL-MCNC: 3.8 GM/DL (ref 3.4–5)
ALP BLD-CCNC: 169 IU/L (ref 40–129)
ALT SERPL-CCNC: 8 U/L (ref 10–40)
ANION GAP SERPL CALCULATED.3IONS-SCNC: 20 MMOL/L (ref 7–16)
AST SERPL-CCNC: 16 IU/L (ref 15–37)
BASOPHILS ABSOLUTE: 0.1 K/CU MM
BASOPHILS RELATIVE PERCENT: 1 % (ref 0–1)
BILIRUB SERPL-MCNC: 0.3 MG/DL (ref 0–1)
BUN SERPL-MCNC: 60 MG/DL (ref 6–23)
CALCIUM SERPL-MCNC: 9.3 MG/DL (ref 8.3–10.6)
CHLORIDE BLD-SCNC: 97 MMOL/L (ref 99–110)
CO2: 25 MMOL/L (ref 21–32)
CREAT SERPL-MCNC: 7 MG/DL (ref 0.6–1.1)
DIFFERENTIAL TYPE: ABNORMAL
EOSINOPHILS ABSOLUTE: 0.2 K/CU MM
EOSINOPHILS RELATIVE PERCENT: 3.2 % (ref 0–3)
GFR, ESTIMATED: 6 ML/MIN/1.73M2
GLUCOSE SERPL-MCNC: 184 MG/DL (ref 70–99)
HCT VFR BLD CALC: 37.3 % (ref 37–47)
HEMOGLOBIN: 11.6 GM/DL (ref 12.5–16)
IMMATURE NEUTROPHIL %: 0.4 % (ref 0–0.43)
LYMPHOCYTES ABSOLUTE: 1.3 K/CU MM
LYMPHOCYTES RELATIVE PERCENT: 18 % (ref 24–44)
MAGNESIUM: 2.4 MG/DL (ref 1.8–2.4)
MCH RBC QN AUTO: 32.5 PG (ref 27–31)
MCHC RBC AUTO-ENTMCNC: 31.1 % (ref 32–36)
MCV RBC AUTO: 104.5 FL (ref 78–100)
MONOCYTES ABSOLUTE: 1 K/CU MM
MONOCYTES RELATIVE PERCENT: 13.3 % (ref 0–4)
NEUTROPHILS ABSOLUTE: 4.6 K/CU MM
NEUTROPHILS RELATIVE PERCENT: 64.1 % (ref 36–66)
PDW BLD-RTO: 15.3 % (ref 11.7–14.9)
PHOSPHORUS: 6.4 MG/DL (ref 2.5–4.9)
PLATELET # BLD: 165 K/CU MM (ref 140–440)
PMV BLD AUTO: 10.5 FL (ref 7.5–11.1)
POTASSIUM SERPL-SCNC: 5 MMOL/L (ref 3.5–5.1)
RBC # BLD: 3.57 M/CU MM (ref 4.2–5.4)
SODIUM BLD-SCNC: 142 MMOL/L (ref 135–145)
TOTAL CK: 51 IU/L (ref 26–140)
TOTAL IMMATURE NEUTOROPHIL: 0.03 K/CU MM
TOTAL PROTEIN: 7.6 GM/DL (ref 6.4–8.2)
WBC # BLD: 7.2 K/CU MM (ref 4–10.5)

## 2024-05-08 PROCEDURE — 80053 COMPREHEN METABOLIC PANEL: CPT

## 2024-05-08 PROCEDURE — 99284 EMERGENCY DEPT VISIT MOD MDM: CPT

## 2024-05-08 PROCEDURE — 93005 ELECTROCARDIOGRAM TRACING: CPT | Performed by: EMERGENCY MEDICINE

## 2024-05-08 PROCEDURE — 84100 ASSAY OF PHOSPHORUS: CPT

## 2024-05-08 PROCEDURE — 85025 COMPLETE CBC W/AUTO DIFF WBC: CPT

## 2024-05-08 PROCEDURE — 83735 ASSAY OF MAGNESIUM: CPT

## 2024-05-08 PROCEDURE — 82550 ASSAY OF CK (CPK): CPT

## 2024-05-08 ASSESSMENT — PAIN - FUNCTIONAL ASSESSMENT
PAIN_FUNCTIONAL_ASSESSMENT: NONE - DENIES PAIN

## 2024-05-08 NOTE — ED PROVIDER NOTES
Emergency Department Encounter    Patient: Divya Albert  MRN: [id number]  : 1965  Date of Evaluation: 2024  ED Provider:  Janice Neal MD    Triage Chief Complaint:   Tremors (Ongoing x2 weeks. Had dialysis on Monday, due to have it again today. )    [place]:  Divya Albert is a 59 y.o. female that presents with concern for tremors, having muscle spasms.  It is in her arms and also now legs.  Waxing waning over the last couple of weeks.  She was recently started on gabapentin, does not recall if this is associated with when the symptoms started.  Was worse on Monday after she had dialysis and she felt like they took off way too much fluid.  She reports she has been eating a lot more recently and had gained some weight but they told her that they thought it was fluid weight and she feels like they took off way too much fluid, she was very shaky afterward.  Also had diarrhea yesterday.  Today she felt like even her legs were shaky and having some tremors.  She saw her cardiologist yesterday, has a history of a pacemaker.  She had talked to him about these symptoms and he wanted to have her blood work checked for her electrolytes, but they were closed when she had left the office yesterday.  No chest pain.  No shortness of breath.  No focal weakness or numbness in extremities.  No headache.  No passing out.  No fevers.  No nausea or vomiting.  Had diarrhea yesterday but no abdominal pain or blood.  Her last dialysis was on Monday, she is due today    ROS - see HPI, below listed is current ROS at time of my eval:  10 systems reviewed and negative except as above.     Past Medical History:   Diagnosis Date    Chronic kidney disease      Past Surgical History:   Procedure Laterality Date    AV FISTULA PLACEMENT      KIDNEY REMOVAL      PACEMAKER INSERTION  2022     History reviewed. No pertinent family history.  Social History     Socioeconomic History    Marital status:      Spouse name: Not

## 2024-05-10 LAB
EKG ATRIAL RATE: 86 BPM
EKG DIAGNOSIS: NORMAL
EKG Q-T INTERVAL: 428 MS
EKG QRS DURATION: 116 MS
EKG QTC CALCULATION (BAZETT): 490 MS
EKG R AXIS: -27 DEGREES
EKG T AXIS: 75 DEGREES
EKG VENTRICULAR RATE: 79 BPM

## 2024-05-10 PROCEDURE — 93010 ELECTROCARDIOGRAM REPORT: CPT | Performed by: INTERNAL MEDICINE

## 2024-06-06 ENCOUNTER — HOSPITAL ENCOUNTER (EMERGENCY)
Age: 59
Discharge: HOME | End: 2024-06-06
Attending: EMERGENCY MEDICINE
Payer: COMMERCIAL

## 2024-06-06 VITALS
WEIGHT: 194 LBS | SYSTOLIC BLOOD PRESSURE: 150 MMHG | HEART RATE: 84 BPM | RESPIRATION RATE: 17 BRPM | OXYGEN SATURATION: 92 % | TEMPERATURE: 98.7 F | BODY MASS INDEX: 32.32 KG/M2 | DIASTOLIC BLOOD PRESSURE: 65 MMHG | HEIGHT: 65 IN

## 2024-06-06 DIAGNOSIS — B37.31 YEAST VAGINITIS: Primary | ICD-10-CM

## 2024-06-06 PROCEDURE — 99283 EMERGENCY DEPT VISIT LOW MDM: CPT

## 2024-06-06 RX ORDER — FLUCONAZOLE 150 MG/1
150 TABLET ORAL ONCE
Qty: 1 TABLET | Refills: 0 | Status: SHIPPED | OUTPATIENT
Start: 2024-06-06 | End: 2024-06-06

## 2024-06-06 ASSESSMENT — PAIN - FUNCTIONAL ASSESSMENT: PAIN_FUNCTIONAL_ASSESSMENT: NONE - DENIES PAIN

## 2024-06-06 NOTE — ED PROVIDER NOTES
taking these medications    Details   fluconazole (DIFLUCAN) 150 MG tablet Take 1 tablet by mouth once for 1 dose, Disp-1 tablet, R-0Normal           ED Provider Disposition Time  DISPOSITION Decision To Discharge 06/06/2024 01:37:00 PM      Comment: Please note this report has been produced using speech recognition software and may contain errors related to that system including errors in grammar, punctuation, and spelling, as well as words and phrases that may be inappropriate.  Efforts were made to edit the dictations.        Kathy Sahu DO  06/06/24 4201

## 2024-08-21 ENCOUNTER — APPOINTMENT (OUTPATIENT)
Dept: GENERAL RADIOLOGY | Age: 59
End: 2024-08-21
Payer: COMMERCIAL

## 2024-08-21 ENCOUNTER — HOSPITAL ENCOUNTER (EMERGENCY)
Age: 59
Discharge: HOME | End: 2024-08-21
Attending: EMERGENCY MEDICINE
Payer: COMMERCIAL

## 2024-08-21 VITALS
OXYGEN SATURATION: 93 % | DIASTOLIC BLOOD PRESSURE: 72 MMHG | BODY MASS INDEX: 34.16 KG/M2 | TEMPERATURE: 98.2 F | HEART RATE: 65 BPM | WEIGHT: 205 LBS | HEIGHT: 65 IN | RESPIRATION RATE: 16 BRPM | SYSTOLIC BLOOD PRESSURE: 182 MMHG

## 2024-08-21 DIAGNOSIS — I10 ESSENTIAL HYPERTENSION: ICD-10-CM

## 2024-08-21 DIAGNOSIS — J44.1 ACUTE EXACERBATION OF CHRONIC OBSTRUCTIVE PULMONARY DISEASE (COPD) (HCC): Primary | ICD-10-CM

## 2024-08-21 DIAGNOSIS — J18.9 PNEUMONIA OF BOTH LUNGS DUE TO INFECTIOUS ORGANISM, UNSPECIFIED PART OF LUNG: ICD-10-CM

## 2024-08-21 LAB
SARS-COV-2 RDRP RESP QL NAA+PROBE: NOT DETECTED
SOURCE: NORMAL

## 2024-08-21 PROCEDURE — 99284 EMERGENCY DEPT VISIT MOD MDM: CPT

## 2024-08-21 PROCEDURE — 6370000000 HC RX 637 (ALT 250 FOR IP): Performed by: EMERGENCY MEDICINE

## 2024-08-21 PROCEDURE — 71045 X-RAY EXAM CHEST 1 VIEW: CPT

## 2024-08-21 PROCEDURE — 87635 SARS-COV-2 COVID-19 AMP PRB: CPT

## 2024-08-21 RX ORDER — ALBUTEROL SULFATE 90 UG/1
2 AEROSOL, METERED RESPIRATORY (INHALATION) EVERY 6 HOURS PRN
COMMUNITY

## 2024-08-21 RX ORDER — AMOXICILLIN AND CLAVULANATE POTASSIUM 875; 125 MG/1; MG/1
1 TABLET, FILM COATED ORAL 2 TIMES DAILY
Qty: 20 TABLET | Refills: 0 | Status: SHIPPED | OUTPATIENT
Start: 2024-08-21 | End: 2024-08-31

## 2024-08-21 RX ORDER — HYDRALAZINE HYDROCHLORIDE 10 MG/1
10 TABLET, FILM COATED ORAL 3 TIMES DAILY
COMMUNITY

## 2024-08-21 RX ORDER — IPRATROPIUM BROMIDE AND ALBUTEROL SULFATE 2.5; .5 MG/3ML; MG/3ML
1 SOLUTION RESPIRATORY (INHALATION) EVERY 4 HOURS
COMMUNITY

## 2024-08-21 RX ORDER — PREDNISONE 20 MG/1
20 TABLET ORAL 2 TIMES DAILY
Qty: 10 TABLET | Refills: 0 | Status: SHIPPED | OUTPATIENT
Start: 2024-08-21 | End: 2024-08-26

## 2024-08-21 RX ORDER — IPRATROPIUM BROMIDE AND ALBUTEROL SULFATE 2.5; .5 MG/3ML; MG/3ML
1 SOLUTION RESPIRATORY (INHALATION) ONCE
Status: COMPLETED | OUTPATIENT
Start: 2024-08-21 | End: 2024-08-21

## 2024-08-21 RX ORDER — DOXYCYCLINE HYCLATE 100 MG
100 TABLET ORAL 2 TIMES DAILY
Qty: 20 TABLET | Refills: 0 | Status: SHIPPED | OUTPATIENT
Start: 2024-08-21 | End: 2024-08-31

## 2024-08-21 RX ORDER — PREDNISONE 20 MG/1
60 TABLET ORAL ONCE
Status: COMPLETED | OUTPATIENT
Start: 2024-08-21 | End: 2024-08-21

## 2024-08-21 RX ADMIN — IPRATROPIUM BROMIDE AND ALBUTEROL SULFATE 1 DOSE: .5; 2.5 SOLUTION RESPIRATORY (INHALATION) at 12:12

## 2024-08-21 RX ADMIN — PREDNISONE 60 MG: 20 TABLET ORAL at 12:04

## 2024-08-21 ASSESSMENT — PAIN SCALES - GENERAL: PAINLEVEL_OUTOF10: 6

## 2024-08-21 ASSESSMENT — PAIN - FUNCTIONAL ASSESSMENT: PAIN_FUNCTIONAL_ASSESSMENT: 0-10

## 2024-08-21 NOTE — ED NOTES
The patient presents to the er today with complaints of congestion, shortness of breath and COVID-19 exposure. She reports that her neighbor has COVID-19.

## 2024-08-21 NOTE — DISCHARGE INSTRUCTIONS
Follow-up with your primary care physician in 1 to 2 days for reevaluation.  Call for an appointment  Take Augmentin and doxycycline antibiotic as prescribed for pneumonia  Continue home nebulizers and inhalers as prescribed and directed for shortness of breath coughing wheezing  Return to the emergency department immediately any pain fever chills nausea vomiting dizzy lightheadedness difficulty breathing worsening symptoms.

## 2024-08-21 NOTE — ED NOTES
Patient discharged with 3 new rx. Patient verbalized understanding of discharge instructions and follow up care. Denies any other questions/concerns at this time.

## 2024-08-21 NOTE — ED PROVIDER NOTES
prednisone 60 mg p.o., COVID 19 swab    Patient COVID swab is negative.  Patient did get some relief with above breathing treatment.  Chest x-ray per my interpretation, vascular congestion and possible pneumonia.  Radiology report did review early perihilar infiltrate suspecting pulmonary vascular congestion.  Patient was updated on these imaging study findings.  Patient feels better with above breathing treatment.  Patient will be placed on Augmentin 875 mg p.o. twice daily, doxycycline 100 mg p.o. twice daily for suspected pneumonia COPD exacerbation.  Discussed with patient I will give her prescription for prednisone 20 mg twice a day for 5 days.  Patient to follow her primary care physician Dr. Lira in 1 to 2 days reevaluation.  Patient return immediately emerged part for worsening symptoms or all questions patient discharged in stable condition.      History from : Patient    Limitations to history : None    Patient was given the following medications:  Medications   predniSONE (DELTASONE) tablet 60 mg (60 mg Oral Given 8/21/24 1204)   ipratropium 0.5 mg-albuterol 2.5 mg (DUONEB) nebulizer solution 1 Dose (1 Dose Inhalation Given 8/21/24 1212)   ipratropium 0.5 mg-albuterol 2.5 mg (DUONEB) nebulizer solution 1 Dose (1 Dose Inhalation Given 8/21/24 1212)       Independent Imaging Interpretation by me: Chest x-ray per my interpretation pulmonary vascular congestion questionable infiltrates in the lungs    EKG (if obtained): (All EKG's are interpreted by myself in the absence of a cardiologist)     Chronic conditions affecting care: esrd on hemodialysis    Social Determinants : None    Records Reviewed : None      Disposition Considerations (tests considered but not done, Shared Decision Making, Pt Expectation of Test or Tx.):            Discussion with Other Profesionals : None    Discharge condition: stable    I am the Primary Clinician of Record.    Is this patient to be included in the SEP-1 Core Measure due

## 2024-09-10 ENCOUNTER — HOSPITAL ENCOUNTER (EMERGENCY)
Age: 59
Discharge: HOME | End: 2024-09-10
Attending: EMERGENCY MEDICINE
Payer: COMMERCIAL

## 2024-09-10 VITALS
WEIGHT: 204 LBS | SYSTOLIC BLOOD PRESSURE: 188 MMHG | HEART RATE: 81 BPM | RESPIRATION RATE: 18 BRPM | OXYGEN SATURATION: 95 % | TEMPERATURE: 97.8 F | BODY MASS INDEX: 33.99 KG/M2 | DIASTOLIC BLOOD PRESSURE: 64 MMHG | HEIGHT: 65 IN

## 2024-09-10 DIAGNOSIS — Z13.9 ENCOUNTER FOR MEDICAL SCREENING EXAMINATION: Primary | ICD-10-CM

## 2024-09-10 LAB
ANION GAP SERPL CALCULATED.3IONS-SCNC: 21 MMOL/L (ref 4–16)
BUN SERPL-MCNC: 72 MG/DL (ref 6–23)
CALCIUM SERPL-MCNC: 8.8 MG/DL (ref 8.3–10.6)
CHLORIDE SERPL-SCNC: 96 MMOL/L (ref 99–110)
CO2 SERPL-SCNC: 21 MMOL/L (ref 21–32)
CREAT SERPL-MCNC: 9.1 MG/DL (ref 0.6–1.1)
GFR, ESTIMATED: 5 ML/MIN/1.73M2
GLUCOSE SERPL-MCNC: 299 MG/DL (ref 70–99)
POTASSIUM SERPL-SCNC: 4.9 MMOL/L (ref 3.5–5.1)
SODIUM SERPL-SCNC: 138 MMOL/L (ref 135–145)

## 2024-09-10 PROCEDURE — 80048 BASIC METABOLIC PNL TOTAL CA: CPT

## 2024-09-10 PROCEDURE — 99283 EMERGENCY DEPT VISIT LOW MDM: CPT

## 2024-09-10 ASSESSMENT — PAIN - FUNCTIONAL ASSESSMENT: PAIN_FUNCTIONAL_ASSESSMENT: NONE - DENIES PAIN

## 2024-09-10 ASSESSMENT — LIFESTYLE VARIABLES
HOW OFTEN DO YOU HAVE A DRINK CONTAINING ALCOHOL: NEVER
HOW MANY STANDARD DRINKS CONTAINING ALCOHOL DO YOU HAVE ON A TYPICAL DAY: PATIENT DOES NOT DRINK

## 2024-09-10 ASSESSMENT — ENCOUNTER SYMPTOMS
RESPIRATORY NEGATIVE: 1
EYES NEGATIVE: 1
GASTROINTESTINAL NEGATIVE: 1

## 2024-10-13 ENCOUNTER — APPOINTMENT (OUTPATIENT)
Dept: GENERAL RADIOLOGY | Age: 59
End: 2024-10-13
Payer: COMMERCIAL

## 2024-10-13 ENCOUNTER — HOSPITAL ENCOUNTER (EMERGENCY)
Age: 59
Discharge: HOME | End: 2024-10-13
Attending: STUDENT IN AN ORGANIZED HEALTH CARE EDUCATION/TRAINING PROGRAM
Payer: COMMERCIAL

## 2024-10-13 VITALS
DIASTOLIC BLOOD PRESSURE: 78 MMHG | HEIGHT: 65 IN | OXYGEN SATURATION: 92 % | SYSTOLIC BLOOD PRESSURE: 161 MMHG | RESPIRATION RATE: 18 BRPM | WEIGHT: 216 LBS | TEMPERATURE: 98.3 F | BODY MASS INDEX: 35.99 KG/M2 | HEART RATE: 74 BPM

## 2024-10-13 DIAGNOSIS — J18.9 PNEUMONIA OF LEFT LOWER LOBE DUE TO INFECTIOUS ORGANISM: Primary | ICD-10-CM

## 2024-10-13 DIAGNOSIS — D64.9 ANEMIA, UNSPECIFIED TYPE: ICD-10-CM

## 2024-10-13 DIAGNOSIS — J44.1 COPD EXACERBATION (HCC): ICD-10-CM

## 2024-10-13 DIAGNOSIS — R79.89 ELEVATED TROPONIN: ICD-10-CM

## 2024-10-13 DIAGNOSIS — N18.6 ESRD (END STAGE RENAL DISEASE) (HCC): ICD-10-CM

## 2024-10-13 LAB
ANION GAP SERPL CALCULATED.3IONS-SCNC: 16 MMOL/L (ref 4–16)
BASOPHILS # BLD: 0.04 K/UL
BASOPHILS NFR BLD: 1 % (ref 0–1)
BUN SERPL-MCNC: 59 MG/DL (ref 6–23)
CALCIUM SERPL-MCNC: 9 MG/DL (ref 8.3–10.6)
CHLORIDE SERPL-SCNC: 99 MMOL/L (ref 99–110)
CO2 SERPL-SCNC: 24 MMOL/L (ref 21–32)
CREAT SERPL-MCNC: 9 MG/DL (ref 0.6–1.1)
EOSINOPHIL # BLD: 0.12 K/UL
EOSINOPHILS RELATIVE PERCENT: 2 % (ref 0–3)
ERYTHROCYTE [DISTWIDTH] IN BLOOD BY AUTOMATED COUNT: 16 % (ref 11.7–14.9)
GFR, ESTIMATED: 5 ML/MIN/1.73M2
GLUCOSE SERPL-MCNC: 147 MG/DL (ref 70–99)
HCT VFR BLD AUTO: 30.4 % (ref 37–47)
HGB BLD-MCNC: 9.5 G/DL (ref 12.5–16)
IMM GRANULOCYTES # BLD AUTO: 0.03 K/UL
IMM GRANULOCYTES NFR BLD: 0 %
LYMPHOCYTES NFR BLD: 0.94 K/UL
LYMPHOCYTES RELATIVE PERCENT: 13 % (ref 24–44)
MCH RBC QN AUTO: 32.1 PG (ref 27–31)
MCHC RBC AUTO-ENTMCNC: 31.3 G/DL (ref 32–36)
MCV RBC AUTO: 102.7 FL (ref 78–100)
MONOCYTES NFR BLD: 1.01 K/UL
MONOCYTES NFR BLD: 14 % (ref 0–4)
NEUTROPHILS NFR BLD: 71 % (ref 36–66)
NEUTS SEG NFR BLD: 5.21 K/UL
PLATELET # BLD AUTO: 135 K/UL (ref 140–440)
PMV BLD AUTO: 10 FL (ref 7.5–11.1)
POTASSIUM SERPL-SCNC: 4.3 MMOL/L (ref 3.5–5.1)
RBC # BLD AUTO: 2.96 M/UL (ref 4.2–5.4)
SODIUM SERPL-SCNC: 139 MMOL/L (ref 135–145)
TROPONIN I SERPL HS-MCNC: 97 NG/L (ref 0–13)
TROPONIN I SERPL HS-MCNC: 98 NG/L (ref 0–13)
WBC OTHER # BLD: 7.4 K/UL (ref 4–10.5)

## 2024-10-13 PROCEDURE — 71046 X-RAY EXAM CHEST 2 VIEWS: CPT

## 2024-10-13 PROCEDURE — 85025 COMPLETE CBC W/AUTO DIFF WBC: CPT

## 2024-10-13 PROCEDURE — 84484 ASSAY OF TROPONIN QUANT: CPT

## 2024-10-13 PROCEDURE — 99285 EMERGENCY DEPT VISIT HI MDM: CPT

## 2024-10-13 PROCEDURE — 93005 ELECTROCARDIOGRAM TRACING: CPT | Performed by: STUDENT IN AN ORGANIZED HEALTH CARE EDUCATION/TRAINING PROGRAM

## 2024-10-13 PROCEDURE — 6370000000 HC RX 637 (ALT 250 FOR IP): Performed by: STUDENT IN AN ORGANIZED HEALTH CARE EDUCATION/TRAINING PROGRAM

## 2024-10-13 PROCEDURE — 80048 BASIC METABOLIC PNL TOTAL CA: CPT

## 2024-10-13 RX ORDER — BUDESONIDE AND FORMOTEROL FUMARATE DIHYDRATE 160; 4.5 UG/1; UG/1
2 AEROSOL RESPIRATORY (INHALATION) 2 TIMES DAILY
Qty: 30.6 G | Refills: 1 | Status: SHIPPED | OUTPATIENT
Start: 2024-10-13

## 2024-10-13 RX ORDER — IPRATROPIUM BROMIDE AND ALBUTEROL SULFATE 2.5; .5 MG/3ML; MG/3ML
3 SOLUTION RESPIRATORY (INHALATION) ONCE
Status: COMPLETED | OUTPATIENT
Start: 2024-10-13 | End: 2024-10-13

## 2024-10-13 RX ORDER — PREDNISONE 20 MG/1
40 TABLET ORAL ONCE
Status: COMPLETED | OUTPATIENT
Start: 2024-10-13 | End: 2024-10-13

## 2024-10-13 RX ORDER — FLUCONAZOLE 150 MG/1
150 TABLET ORAL ONCE
Qty: 1 TABLET | Refills: 0 | Status: SHIPPED | OUTPATIENT
Start: 2024-10-13 | End: 2024-10-13

## 2024-10-13 RX ORDER — PREDNISONE 20 MG/1
20 TABLET ORAL 2 TIMES DAILY
Qty: 10 TABLET | Refills: 0 | Status: SHIPPED | OUTPATIENT
Start: 2024-10-13 | End: 2024-10-18

## 2024-10-13 RX ORDER — DOXYCYCLINE HYCLATE 100 MG
100 TABLET ORAL ONCE
Status: COMPLETED | OUTPATIENT
Start: 2024-10-13 | End: 2024-10-13

## 2024-10-13 RX ORDER — DOXYCYCLINE HYCLATE 100 MG
100 TABLET ORAL EVERY 12 HOURS SCHEDULED
Status: DISCONTINUED | OUTPATIENT
Start: 2024-10-13 | End: 2024-10-13

## 2024-10-13 RX ORDER — DOXYCYCLINE HYCLATE 100 MG
100 TABLET ORAL 2 TIMES DAILY
Qty: 14 TABLET | Refills: 0 | Status: SHIPPED | OUTPATIENT
Start: 2024-10-13 | End: 2024-10-20

## 2024-10-13 RX ORDER — FLUCONAZOLE 100 MG/1
200 TABLET ORAL ONCE
Status: COMPLETED | OUTPATIENT
Start: 2024-10-13 | End: 2024-10-13

## 2024-10-13 RX ADMIN — DOXYCYCLINE HYCLATE 100 MG: 100 TABLET, COATED ORAL at 16:34

## 2024-10-13 RX ADMIN — AMOXICILLIN AND CLAVULANATE POTASSIUM 1 TABLET: 875; 125 TABLET, FILM COATED ORAL at 16:34

## 2024-10-13 RX ADMIN — PREDNISONE 40 MG: 20 TABLET ORAL at 15:19

## 2024-10-13 RX ADMIN — IPRATROPIUM BROMIDE AND ALBUTEROL SULFATE 3 DOSE: .5; 2.5 SOLUTION RESPIRATORY (INHALATION) at 15:19

## 2024-10-13 RX ADMIN — FLUCONAZOLE 200 MG: 100 TABLET ORAL at 15:19

## 2024-10-13 ASSESSMENT — PAIN - FUNCTIONAL ASSESSMENT
PAIN_FUNCTIONAL_ASSESSMENT: NONE - DENIES PAIN
PAIN_FUNCTIONAL_ASSESSMENT: NONE - DENIES PAIN

## 2024-10-13 NOTE — ED PROVIDER NOTES
acute distress.     Appearance: She is not toxic-appearing.   HENT:      Head: Normocephalic and atraumatic.      Nose: Nose normal.      Mouth/Throat:      Mouth: Mucous membranes are moist.   Eyes:      General: No scleral icterus.  Cardiovascular:      Rate and Rhythm: Normal rate and regular rhythm.   Pulmonary:      Effort: No respiratory distress.      Breath sounds: Wheezing present. No rhonchi or rales.   Musculoskeletal:      Right lower leg: No edema.      Left lower leg: No edema.   Skin:     General: Skin is warm.      Capillary Refill: Capillary refill takes less than 2 seconds.   Neurological:      Mental Status: She is alert and oriented to person, place, and time.         I have reviewed and interpreted all of the currently available lab results from this visit (if applicable):  Results for orders placed or performed during the hospital encounter of 10/13/24   CBC with Auto Differential   Result Value Ref Range    WBC 7.4 4.0 - 10.5 k/uL    RBC 2.96 (L) 4.20 - 5.40 m/uL    Hemoglobin 9.5 (L) 12.5 - 16.0 g/dL    Hematocrit 30.4 (L) 37.0 - 47.0 %    .7 (H) 78.0 - 100.0 fL    MCH 32.1 (H) 27.0 - 31.0 pg    MCHC 31.3 (L) 32.0 - 36.0 g/dL    RDW 16.0 (H) 11.7 - 14.9 %    Platelets 135 (L) 140 - 440 k/uL    MPV 10.0 7.5 - 11.1 fL    Neutrophils % 71 (H) 36 - 66 %    Lymphocytes % 13 (L) 24 - 44 %    Monocytes % 14 (H) 0 - 4 %    Eosinophils % 2 0 - 3 %    Basophils % 1 0 - 1 %    Immature Granulocytes % 0 0 %    Neutrophils Absolute 5.21 k/uL    Lymphocytes Absolute 0.94 k/uL    Monocytes Absolute 1.01 k/uL    Eosinophils Absolute 0.12 k/uL    Basophils Absolute 0.04 k/uL    Immature Granulocytes Absolute 0.03 k/uL   BMP   Result Value Ref Range    Sodium 139 135 - 145 mmol/L    Potassium 4.3 3.5 - 5.1 mmol/L    Chloride 99 99 - 110 mmol/L    CO2 24 21 - 32 mmol/L    Anion Gap 16 4 - 16 mmol/L    Glucose 147 (H) 70 - 99 mg/dL    BUN 59 (H) 6 - 23 mg/dL    Creatinine 9.0 (H) 0.6 - 1.1 mg/dL    Est,

## 2024-10-14 LAB
EKG ATRIAL RATE: 67 BPM
EKG DIAGNOSIS: NORMAL
EKG P AXIS: 64 DEGREES
EKG P-R INTERVAL: 172 MS
EKG Q-T INTERVAL: 460 MS
EKG QRS DURATION: 106 MS
EKG QTC CALCULATION (BAZETT): 486 MS
EKG R AXIS: -15 DEGREES
EKG T AXIS: 64 DEGREES
EKG VENTRICULAR RATE: 67 BPM

## 2024-10-14 PROCEDURE — 93010 ELECTROCARDIOGRAM REPORT: CPT | Performed by: INTERNAL MEDICINE

## 2024-10-24 ENCOUNTER — OFFICE (OUTPATIENT)
Dept: URBAN - METROPOLITAN AREA CLINIC 18 | Facility: CLINIC | Age: 59
End: 2024-10-24
Payer: COMMERCIAL

## 2024-10-24 VITALS
HEART RATE: 76 BPM | HEIGHT: 65 IN | SYSTOLIC BLOOD PRESSURE: 136 MMHG | DIASTOLIC BLOOD PRESSURE: 60 MMHG | WEIGHT: 212 LBS | OXYGEN SATURATION: 95 %

## 2024-10-24 DIAGNOSIS — K74.60 UNSPECIFIED CIRRHOSIS OF LIVER: ICD-10-CM

## 2024-10-24 DIAGNOSIS — R11.0 NAUSEA: ICD-10-CM

## 2024-10-24 PROCEDURE — 99215 OFFICE O/P EST HI 40 MIN: CPT | Performed by: INTERNAL MEDICINE

## 2024-10-24 RX ORDER — RIFAXIMIN 550 MG/1
1650 TABLET ORAL
Qty: 90 | Refills: 5 | Status: ACTIVE
Start: 2024-10-24

## 2024-12-08 ENCOUNTER — HOSPITAL ENCOUNTER (EMERGENCY)
Age: 59
Discharge: HOME | End: 2024-12-08
Attending: EMERGENCY MEDICINE
Payer: COMMERCIAL

## 2024-12-08 VITALS
TEMPERATURE: 97.9 F | HEART RATE: 88 BPM | RESPIRATION RATE: 19 BRPM | WEIGHT: 216 LBS | DIASTOLIC BLOOD PRESSURE: 77 MMHG | SYSTOLIC BLOOD PRESSURE: 168 MMHG | BODY MASS INDEX: 35.94 KG/M2

## 2024-12-08 DIAGNOSIS — N18.6 END STAGE RENAL DISEASE (HCC): Primary | ICD-10-CM

## 2024-12-08 LAB
ALBUMIN SERPL-MCNC: 3.2 G/DL (ref 3.4–5)
ALBUMIN/GLOB SERPL: 0.8 {RATIO} (ref 1.1–2.2)
ALP SERPL-CCNC: 204 U/L (ref 40–129)
ALT SERPL-CCNC: <5 U/L (ref 10–40)
ANION GAP SERPL CALCULATED.3IONS-SCNC: 18 MMOL/L (ref 4–16)
AST SERPL-CCNC: 10 U/L (ref 15–37)
BASOPHILS # BLD: 0.05 K/UL
BASOPHILS NFR BLD: 1 % (ref 0–1)
BILIRUB SERPL-MCNC: 0.3 MG/DL (ref 0–1)
BUN SERPL-MCNC: 70 MG/DL (ref 6–23)
CALCIUM SERPL-MCNC: 8.7 MG/DL (ref 8.3–10.6)
CHLORIDE SERPL-SCNC: 102 MMOL/L (ref 99–110)
CO2 SERPL-SCNC: 17 MMOL/L (ref 21–32)
CREAT SERPL-MCNC: 9.7 MG/DL (ref 0.6–1.1)
EOSINOPHIL # BLD: 0.23 K/UL
EOSINOPHILS RELATIVE PERCENT: 4 % (ref 0–3)
ERYTHROCYTE [DISTWIDTH] IN BLOOD BY AUTOMATED COUNT: 15.4 % (ref 11.7–14.9)
GFR, ESTIMATED: 4 ML/MIN/1.73M2
GLUCOSE SERPL-MCNC: 139 MG/DL (ref 70–99)
HCT VFR BLD AUTO: 31.9 % (ref 37–47)
HGB BLD-MCNC: 10.1 G/DL (ref 12.5–16)
IMM GRANULOCYTES # BLD AUTO: 0.04 K/UL
IMM GRANULOCYTES NFR BLD: 1 %
LYMPHOCYTES NFR BLD: 0.89 K/UL
LYMPHOCYTES RELATIVE PERCENT: 14 % (ref 24–44)
MCH RBC QN AUTO: 31.9 PG (ref 27–31)
MCHC RBC AUTO-ENTMCNC: 31.7 G/DL (ref 32–36)
MCV RBC AUTO: 100.6 FL (ref 78–100)
MONOCYTES NFR BLD: 0.52 K/UL
MONOCYTES NFR BLD: 8 % (ref 0–4)
NEUTROPHILS NFR BLD: 73 % (ref 36–66)
NEUTS SEG NFR BLD: 4.59 K/UL
PLATELET # BLD AUTO: 195 K/UL (ref 140–440)
PMV BLD AUTO: 9.9 FL (ref 7.5–11.1)
POTASSIUM SERPL-SCNC: 4.4 MMOL/L (ref 3.5–5.1)
PROT SERPL-MCNC: 7 G/DL (ref 6.4–8.2)
RBC # BLD AUTO: 3.17 M/UL (ref 4.2–5.4)
SODIUM SERPL-SCNC: 137 MMOL/L (ref 135–145)
WBC OTHER # BLD: 6.3 K/UL (ref 4–10.5)

## 2024-12-08 PROCEDURE — 85025 COMPLETE CBC W/AUTO DIFF WBC: CPT

## 2024-12-08 PROCEDURE — 80053 COMPREHEN METABOLIC PANEL: CPT

## 2024-12-08 PROCEDURE — 99283 EMERGENCY DEPT VISIT LOW MDM: CPT

## 2024-12-09 NOTE — ED PROVIDER NOTES
Emergency Department Encounter    Patient: Divya Albert  MRN: [id number]  : 1965  Date of Evaluation: 2024  ED Provider:  Dayday Carcamo MD    Triage Chief Complaint:   Labs Only    [place]:  Divya Albert is a 59 y.o. female history of asthma, end-stage renal disease on dialysis, COPD that presents to urgent department stating that she was told by her dialysis center that before they accept her back she should go to the ER to get \"lab work done.\"  Patient states she missed dialysis \"for a while.\"  She does not recall when she was last dialyzed.  However she denies any symptoms such as fatigue muscle cramps or any other symptoms at this time.    ROS - see HPI, below listed is current ROS at time of my eval:  General:  No fevers, no chills, no weakness  Cardiovascular:  No chest pain, no palpitations  Respiratory:  No shortness of breath, no cough, no wheezing  Gastrointestinal:  No pain, no nausea, no vomiting, no diarrhea  Musculoskeletal:  No muscle pain, no joint pain  Skin:  No rash, no pruritis, no easy bruising  Neurologic:  No speech problems, no headache, no extremity numbness, no extremity tingling, no extremity weakness  Psychiatric:  No anxiety  Genitourinary:  No dysuria, no hematuria  Endocrine: Noncompliant with her dialysis  Extremities:  no edema, no pain    Past Medical History:   Diagnosis Date    Asthma     Chronic kidney disease     COPD (chronic obstructive pulmonary disease) (HCC)      Past Surgical History:   Procedure Laterality Date    AV FISTULA PLACEMENT      KIDNEY REMOVAL      PACEMAKER INSERTION  2022     History reviewed. No pertinent family history.  Social History     Socioeconomic History    Marital status:      Spouse name: Not on file    Number of children: Not on file    Years of education: Not on file    Highest education level: Not on file   Occupational History    Not on file   Tobacco Use    Smoking status: Every Day     Current packs/day: 0.50

## 2024-12-09 NOTE — DISCHARGE INSTRUCTIONS
Please consult with your nephrologist office/dialysis center tomorrow morning to make an appointment for dialysis